# Patient Record
Sex: FEMALE | Race: WHITE | NOT HISPANIC OR LATINO | Employment: OTHER | URBAN - METROPOLITAN AREA
[De-identification: names, ages, dates, MRNs, and addresses within clinical notes are randomized per-mention and may not be internally consistent; named-entity substitution may affect disease eponyms.]

---

## 2017-01-20 DIAGNOSIS — E03.9 ACQUIRED HYPOTHYROIDISM: ICD-10-CM

## 2017-01-20 RX ORDER — LEVOTHYROXINE SODIUM 88 MCG
TABLET ORAL
Qty: 90 TABLET | Refills: 0 | Status: SHIPPED | OUTPATIENT
Start: 2017-01-20 | End: 2017-04-16 | Stop reason: SDUPTHER

## 2017-03-03 ENCOUNTER — OFFICE VISIT (OUTPATIENT)
Dept: INTERNAL MEDICINE | Facility: CLINIC | Age: 37
End: 2017-03-03

## 2017-03-03 VITALS
BODY MASS INDEX: 26.96 KG/M2 | HEIGHT: 65 IN | HEART RATE: 80 BPM | OXYGEN SATURATION: 98 % | WEIGHT: 161.8 LBS | SYSTOLIC BLOOD PRESSURE: 116 MMHG | DIASTOLIC BLOOD PRESSURE: 78 MMHG

## 2017-03-03 DIAGNOSIS — L70.9 ACNE, UNSPECIFIED ACNE TYPE: ICD-10-CM

## 2017-03-03 DIAGNOSIS — M54.40 BILATERAL LOW BACK PAIN WITH SCIATICA, SCIATICA LATERALITY UNSPECIFIED, UNSPECIFIED CHRONICITY: Primary | ICD-10-CM

## 2017-03-03 DIAGNOSIS — E53.8 B12 DEFICIENCY: ICD-10-CM

## 2017-03-03 DIAGNOSIS — R53.83 FATIGUE, UNSPECIFIED TYPE: ICD-10-CM

## 2017-03-03 DIAGNOSIS — E03.9 ACQUIRED HYPOTHYROIDISM: ICD-10-CM

## 2017-03-03 LAB
ANION GAP SERPL CALCULATED.3IONS-SCNC: 3 MMOL/L (ref 3–11)
BUN BLD-MCNC: 12 MG/DL (ref 9–23)
BUN/CREAT SERPL: 17.1 (ref 7–25)
CALCIUM SPEC-SCNC: 9.5 MG/DL (ref 8.7–10.4)
CHLORIDE SERPL-SCNC: 111 MMOL/L (ref 99–109)
CO2 SERPL-SCNC: 27 MMOL/L (ref 20–31)
CREAT BLD-MCNC: 0.7 MG/DL (ref 0.6–1.3)
DEPRECATED RDW RBC AUTO: 38.9 FL (ref 37–54)
ERYTHROCYTE [DISTWIDTH] IN BLOOD BY AUTOMATED COUNT: 11.5 % (ref 11.3–14.5)
FERRITIN SERPL-MCNC: 154 NG/ML (ref 10–291)
GFR SERPL CREATININE-BSD FRML MDRD: 94 ML/MIN/1.73
GLUCOSE BLD-MCNC: 89 MG/DL (ref 70–100)
HCT VFR BLD AUTO: 42.2 % (ref 34.5–44)
HGB BLD-MCNC: 14.8 G/DL (ref 11.5–15.5)
MCH RBC QN AUTO: 32.8 PG (ref 27–31)
MCHC RBC AUTO-ENTMCNC: 35.1 G/DL (ref 32–36)
MCV RBC AUTO: 93.6 FL (ref 80–99)
PLATELET # BLD AUTO: 268 10*3/MM3 (ref 150–450)
PMV BLD AUTO: 10.7 FL (ref 6–12)
POTASSIUM BLD-SCNC: 4.3 MMOL/L (ref 3.5–5.5)
RBC # BLD AUTO: 4.51 10*6/MM3 (ref 3.89–5.14)
SODIUM BLD-SCNC: 141 MMOL/L (ref 132–146)
T4 FREE SERPL-MCNC: 1.49 NG/DL (ref 0.89–1.76)
TSH SERPL DL<=0.05 MIU/L-ACNC: 1.15 MIU/ML (ref 0.35–5.35)
VIT B12 BLD-MCNC: >2000 PG/ML (ref 211–911)
WBC NRBC COR # BLD: 11.67 10*3/MM3 (ref 3.5–10.8)

## 2017-03-03 PROCEDURE — 99214 OFFICE O/P EST MOD 30 MIN: CPT | Performed by: INTERNAL MEDICINE

## 2017-03-03 PROCEDURE — 85027 COMPLETE CBC AUTOMATED: CPT | Performed by: INTERNAL MEDICINE

## 2017-03-03 PROCEDURE — 82728 ASSAY OF FERRITIN: CPT | Performed by: INTERNAL MEDICINE

## 2017-03-03 PROCEDURE — 84439 ASSAY OF FREE THYROXINE: CPT | Performed by: INTERNAL MEDICINE

## 2017-03-03 PROCEDURE — 84443 ASSAY THYROID STIM HORMONE: CPT | Performed by: INTERNAL MEDICINE

## 2017-03-03 PROCEDURE — 82607 VITAMIN B-12: CPT | Performed by: INTERNAL MEDICINE

## 2017-03-03 PROCEDURE — 80048 BASIC METABOLIC PNL TOTAL CA: CPT | Performed by: INTERNAL MEDICINE

## 2017-03-03 RX ORDER — HYDROCODONE BITARTRATE AND ACETAMINOPHEN 10; 325 MG/1; MG/1
1 TABLET ORAL EVERY 6 HOURS PRN
Qty: 120 TABLET | Refills: 0 | Status: SHIPPED | OUTPATIENT
Start: 2017-03-03 | End: 2017-03-03 | Stop reason: SDUPTHER

## 2017-03-03 RX ORDER — HYDROCODONE BITARTRATE AND ACETAMINOPHEN 10; 325 MG/1; MG/1
1 TABLET ORAL EVERY 6 HOURS PRN
Qty: 120 TABLET | Refills: 0 | Status: SHIPPED | OUTPATIENT
Start: 2017-03-03 | End: 2017-05-08 | Stop reason: SDUPTHER

## 2017-03-03 RX ORDER — CYCLOBENZAPRINE HCL 10 MG
10 TABLET ORAL DAILY
Qty: 30 TABLET | Refills: 4 | Status: SHIPPED | OUTPATIENT
Start: 2017-03-03 | End: 2019-06-18 | Stop reason: SDUPTHER

## 2017-03-03 RX ORDER — SPIRONOLACTONE 25 MG/1
50 TABLET ORAL DAILY
Qty: 60 TABLET | Refills: 5 | Status: SHIPPED | OUTPATIENT
Start: 2017-03-03 | End: 2017-08-08 | Stop reason: SDUPTHER

## 2017-03-03 NOTE — PROGRESS NOTES
Fatigue (worried about thyroid) and Follow-up (medication refills)    Subjective   Subha Paitno is a 36 y.o. female is here today for follow-up.    History of Present Illness   Having a lot of fatigue, thinks its due to her thyroid or B12. Thyroid was low last check.  Keeps forgetting to take her B12.  Also having a lot of breakout, which she never had before.    Current Outpatient Prescriptions:   •  Cyanocobalamin (VITAMIN B-12) 5000 MCG sublingual tablet, Take 1 tablet by mouth Daily., Disp: , Rfl: 3  •  cyclobenzaprine (FLEXERIL) 10 MG tablet, Take 1 tablet by mouth Daily., Disp: 30 tablet, Rfl: 4  •  HYDROcodone-acetaminophen (NORCO)  MG per tablet, Take 1 tablet by mouth Every 6 (Six) Hours As Needed for moderate pain (4-6). Fill after 4/3/17, Disp: 120 tablet, Rfl: 0  •  medroxyPROGESTERone (DEPO-PROVERA) 400 MG/ML suspension injection, Apply  to cheek., Disp: , Rfl:   •  SYNTHROID 88 MCG tablet, TAKE 1 TABLET DAILY, Disp: 90 tablet, Rfl: 0  •  spironolactone (ALDACTONE) 25 MG tablet, Take 2 tablets by mouth Daily., Disp: 60 tablet, Rfl: 5      The following portions of the patient's history were reviewed and updated as appropriate: allergies, current medications, past family history, past medical history, past social history, past surgical history and problem list.    Review of Systems   Constitutional: Positive for fatigue. Negative for chills and fever.   HENT: Positive for rhinorrhea. Negative for ear discharge, ear pain, sinus pressure and sore throat.    Respiratory: Negative for cough, chest tightness and shortness of breath.    Cardiovascular: Negative for chest pain, palpitations and leg swelling.   Gastrointestinal: Negative for diarrhea, nausea and vomiting.   Musculoskeletal: Positive for back pain and myalgias. Negative for arthralgias.   Skin: Positive for rash (acne breakout, and mole on face.).   Neurological: Negative for dizziness, syncope and headaches.   Psychiatric/Behavioral:  "Negative for confusion and sleep disturbance.       Objective   Visit Vitals   • /78   • Pulse 80   • Ht 65\" (165.1 cm)   • Wt 161 lb 12.8 oz (73.4 kg)   • SpO2 98%  Comment: ra   • BMI 26.92 kg/m2     Physical Exam      Results for orders placed or performed in visit on 03/03/17   TSH   Result Value Ref Range    TSH 1.148 0.350 - 5.350 mIU/mL   T4, Free   Result Value Ref Range    Free T4 1.49 0.89 - 1.76 ng/dL   Vitamin B12   Result Value Ref Range    Vitamin B-12 >2000 (H) 211 - 911 pg/mL   Ferritin   Result Value Ref Range    Ferritin 154.00 10.00 - 291.00 ng/mL   CBC (No Diff)   Result Value Ref Range    WBC 11.67 (H) 3.50 - 10.80 10*3/mm3    RBC 4.51 3.89 - 5.14 10*6/mm3    Hemoglobin 14.8 11.5 - 15.5 g/dL    Hematocrit 42.2 34.5 - 44.0 %    MCV 93.6 80.0 - 99.0 fL    MCH 32.8 (H) 27.0 - 31.0 pg    MCHC 35.1 32.0 - 36.0 g/dL    RDW 11.5 11.3 - 14.5 %    RDW-SD 38.9 37.0 - 54.0 fl    MPV 10.7 6.0 - 12.0 fL    Platelets 268 150 - 450 10*3/mm3   Basic Metabolic Panel   Result Value Ref Range    Glucose 89 70 - 100 mg/dL    BUN 12 9 - 23 mg/dL    Creatinine 0.70 0.60 - 1.30 mg/dL    Sodium 141 132 - 146 mmol/L    Potassium 4.3 3.5 - 5.5 mmol/L    Chloride 111 (H) 99 - 109 mmol/L    CO2 27.0 20.0 - 31.0 mmol/L    Calcium 9.5 8.7 - 10.4 mg/dL    eGFR Non African Amer 94 >60 mL/min/1.73    BUN/Creatinine Ratio 17.1 7.0 - 25.0    Anion Gap 3.0 3.0 - 11.0 mmol/L             Assessment/Plan   Diagnoses and all orders for this visit:    Bilateral low back pain with sciatica, sciatica laterality unspecified, unspecified chronicity  -     Discontinue: HYDROcodone-acetaminophen (NORCO)  MG per tablet; Take 1 tablet by mouth Every 6 (Six) Hours As Needed for moderate pain (4-6).  -     HYDROcodone-acetaminophen (NORCO)  MG per tablet; Take 1 tablet by mouth Every 6 (Six) Hours As Needed for moderate pain (4-6). Fill after 4/3/17    Acquired hypothyroidism  -     TSH  -     T4, Free  -     CBC (No " Diff)    Acne, unspecified acne type  -     spironolactone (ALDACTONE) 25 MG tablet; Take 2 tablets by mouth Daily.  -     Basic Metabolic Panel; Future    Fatigue, unspecified type  -     Vitamin B12  -     Ferritin  -     Basic Metabolic Panel    B12 deficiency  -     Vitamin B12    Other orders  -     Cyanocobalamin (VITAMIN B-12) 5000 MCG sublingual tablet; Take 1 tablet by mouth Daily.  -     cyclobenzaprine (FLEXERIL) 10 MG tablet; Take 1 tablet by mouth Daily.    Derm self referral sheet given to get the mole on face evaluated.       The patient has read and signed the [facility] Controlled Substance Contract.  I will continue to see patient for regular follow up appointments.  They are well controlled on their medication.  MARJORIE has been reviewed by me and is updated every 3 months. The patient is aware of the potential for addiction and dependence.      Return in about 3 months (around 6/3/2017) for Next scheduled follow up.

## 2017-03-07 ENCOUNTER — TELEPHONE (OUTPATIENT)
Dept: INTERNAL MEDICINE | Facility: CLINIC | Age: 37
End: 2017-03-07

## 2017-03-07 NOTE — TELEPHONE ENCOUNTER
----- Message from Madhuri Fuller sent at 3/7/2017  3:34 PM EST -----  Contact: PATIENT  RE: LAB RESULTS    PATIENT CALLED TO SEE IF RESULTS FROM RECENT LABS ARE BACK     CALL BACK #[phone]

## 2017-03-09 NOTE — TELEPHONE ENCOUNTER
Pls. Let her know:     Thyroid is much better and the rest of the labs are stable. B12 and iron are excellent.     Please continue current regimen. Letter in the mail.    thanks

## 2017-04-13 ENCOUNTER — LAB (OUTPATIENT)
Dept: INTERNAL MEDICINE | Facility: CLINIC | Age: 37
End: 2017-04-13

## 2017-04-13 DIAGNOSIS — L70.9 ACNE, UNSPECIFIED ACNE TYPE: ICD-10-CM

## 2017-04-13 LAB
ANION GAP SERPL CALCULATED.3IONS-SCNC: 8 MMOL/L (ref 3–11)
BUN BLD-MCNC: 10 MG/DL (ref 9–23)
BUN/CREAT SERPL: 12.5 (ref 7–25)
CALCIUM SPEC-SCNC: 10.2 MG/DL (ref 8.7–10.4)
CHLORIDE SERPL-SCNC: 102 MMOL/L (ref 99–109)
CO2 SERPL-SCNC: 23 MMOL/L (ref 20–31)
CREAT BLD-MCNC: 0.8 MG/DL (ref 0.6–1.3)
GFR SERPL CREATININE-BSD FRML MDRD: 81 ML/MIN/1.73
GLUCOSE BLD-MCNC: 94 MG/DL (ref 70–100)
POTASSIUM BLD-SCNC: 3.9 MMOL/L (ref 3.5–5.5)
SODIUM BLD-SCNC: 133 MMOL/L (ref 132–146)

## 2017-04-13 PROCEDURE — 80048 BASIC METABOLIC PNL TOTAL CA: CPT | Performed by: INTERNAL MEDICINE

## 2017-04-16 DIAGNOSIS — E03.9 ACQUIRED HYPOTHYROIDISM: ICD-10-CM

## 2017-04-17 RX ORDER — LEVOTHYROXINE SODIUM 88 MCG
TABLET ORAL
Qty: 90 TABLET | Refills: 0 | Status: SHIPPED | OUTPATIENT
Start: 2017-04-17 | End: 2017-07-16 | Stop reason: SDUPTHER

## 2017-05-08 DIAGNOSIS — M54.40 BILATERAL LOW BACK PAIN WITH SCIATICA, SCIATICA LATERALITY UNSPECIFIED, UNSPECIFIED CHRONICITY: ICD-10-CM

## 2017-05-08 RX ORDER — HYDROCODONE BITARTRATE AND ACETAMINOPHEN 10; 325 MG/1; MG/1
1 TABLET ORAL EVERY 6 HOURS PRN
Qty: 120 TABLET | Refills: 0 | Status: SHIPPED | OUTPATIENT
Start: 2017-05-08 | End: 2017-06-06 | Stop reason: SDUPTHER

## 2017-06-06 ENCOUNTER — OFFICE VISIT (OUTPATIENT)
Dept: INTERNAL MEDICINE | Facility: CLINIC | Age: 37
End: 2017-06-06

## 2017-06-06 VITALS
SYSTOLIC BLOOD PRESSURE: 118 MMHG | WEIGHT: 155 LBS | BODY MASS INDEX: 25.79 KG/M2 | HEART RATE: 91 BPM | DIASTOLIC BLOOD PRESSURE: 78 MMHG | OXYGEN SATURATION: 99 %

## 2017-06-06 DIAGNOSIS — J30.2 SEASONAL ALLERGIC RHINITIS, UNSPECIFIED ALLERGIC RHINITIS TRIGGER: ICD-10-CM

## 2017-06-06 DIAGNOSIS — F43.89 REACTION TO CHRONIC STRESS: ICD-10-CM

## 2017-06-06 DIAGNOSIS — F17.200 CURRENT SMOKER: Primary | ICD-10-CM

## 2017-06-06 DIAGNOSIS — M54.40 BILATERAL LOW BACK PAIN WITH SCIATICA, SCIATICA LATERALITY UNSPECIFIED, UNSPECIFIED CHRONICITY: ICD-10-CM

## 2017-06-06 PROCEDURE — 99214 OFFICE O/P EST MOD 30 MIN: CPT | Performed by: INTERNAL MEDICINE

## 2017-06-06 RX ORDER — NICOTINE 21 MG/24HR
1 PATCH, TRANSDERMAL 24 HOURS TRANSDERMAL EVERY 24 HOURS
Qty: 14 PATCH | Refills: 3 | Status: SHIPPED | OUTPATIENT
Start: 2017-06-06 | End: 2018-12-11

## 2017-06-06 RX ORDER — FLUOXETINE 20 MG/1
20 TABLET, FILM COATED ORAL DAILY
Qty: 30 TABLET | Refills: 3 | Status: SHIPPED | OUTPATIENT
Start: 2017-06-06 | End: 2017-10-03 | Stop reason: SINTOL

## 2017-06-06 RX ORDER — METHYLPREDNISOLONE 4 MG/1
TABLET ORAL
Qty: 21 TABLET | Refills: 0 | Status: SHIPPED | OUTPATIENT
Start: 2017-06-06 | End: 2017-08-08

## 2017-06-06 RX ORDER — HYDROCODONE BITARTRATE AND ACETAMINOPHEN 10; 325 MG/1; MG/1
1 TABLET ORAL EVERY 6 HOURS PRN
Qty: 120 TABLET | Refills: 0 | Status: SHIPPED | OUTPATIENT
Start: 2017-06-06 | End: 2017-06-06 | Stop reason: SDUPTHER

## 2017-06-06 RX ORDER — HYDROCODONE BITARTRATE AND ACETAMINOPHEN 10; 325 MG/1; MG/1
1 TABLET ORAL EVERY 6 HOURS PRN
Qty: 120 TABLET | Refills: 0 | Status: SHIPPED | OUTPATIENT
Start: 2017-06-06 | End: 2017-08-08 | Stop reason: SDUPTHER

## 2017-06-06 RX ORDER — LORATADINE 10 MG/1
10 TABLET ORAL DAILY
Qty: 30 TABLET | Refills: 3 | Status: SHIPPED | OUTPATIENT
Start: 2017-06-06 | End: 2017-08-09 | Stop reason: SDUPTHER

## 2017-06-06 RX ORDER — FLUTICASONE PROPIONATE 50 MCG
2 SPRAY, SUSPENSION (ML) NASAL DAILY
Qty: 1 EACH | Refills: 11 | Status: SHIPPED | OUTPATIENT
Start: 2017-06-06 | End: 2017-07-06

## 2017-06-06 NOTE — PROGRESS NOTES
Back Pain; Allergies; and Stress    Subjective   Subha Patino is a 37 y.o. female is here today for follow-up.    History of Present Illness   Notes coughing more, thinks its allergies.  Stable on curent meds for pain, denies any problems from it.  Very stressed. Worried about her smoking and wants to quit.  Mom has been on prozac but she is scared to take it.  Also havinga lot of congestion and some cough.    Current Outpatient Prescriptions:   •  Cyanocobalamin (VITAMIN B-12) 5000 MCG sublingual tablet, Take 1 tablet by mouth Daily., Disp: , Rfl: 3  •  cyclobenzaprine (FLEXERIL) 10 MG tablet, Take 1 tablet by mouth Daily., Disp: 30 tablet, Rfl: 4  •  HYDROcodone-acetaminophen (NORCO)  MG per tablet, Take 1 tablet by mouth Every 6 (Six) Hours As Needed for Moderate Pain (4-6). Fill after 7/8/17, Disp: 120 tablet, Rfl: 0  •  medroxyPROGESTERone (DEPO-PROVERA) 400 MG/ML suspension injection, Apply  to cheek., Disp: , Rfl:   •  spironolactone (ALDACTONE) 25 MG tablet, Take 2 tablets by mouth Daily., Disp: 60 tablet, Rfl: 5  •  SYNTHROID 88 MCG tablet, TAKE 1 TABLET DAILY, Disp: 90 tablet, Rfl: 0  •  FLUoxetine (PROzac) 20 MG tablet, Take 1 tablet by mouth Daily., Disp: 30 tablet, Rfl: 3  •  fluticasone (FLONASE) 50 MCG/ACT nasal spray, 2 sprays into each nostril Daily for 30 days. Administer 2 sprays in each nostril for each dose for allergies, Disp: 1 each, Rfl: 11  •  loratadine (CLARITIN) 10 MG tablet, Take 1 tablet by mouth Daily., Disp: 30 tablet, Rfl: 3  •  MethylPREDNISolone (MEDROL, TONA,) 4 MG tablet, Take as directed on package instructions., Disp: 21 tablet, Rfl: 0  •  nicotine (NICODERM CQ) 14 MG/24HR patch, Place 1 patch on the skin Daily., Disp: 14 patch, Rfl: 3      The following portions of the patient's history were reviewed and updated as appropriate: allergies, current medications, past family history, past medical history, past social history, past surgical history and problem list.    Review  of Systems   Constitutional: Positive for fatigue. Negative for chills and fever.   HENT: Positive for rhinorrhea. Negative for ear discharge, ear pain, sinus pressure and sore throat.    Respiratory: Negative for cough, chest tightness and shortness of breath.    Cardiovascular: Negative for chest pain, palpitations and leg swelling.   Gastrointestinal: Negative for diarrhea, nausea and vomiting.   Musculoskeletal: Positive for back pain and myalgias. Negative for arthralgias.   Skin: Negative for rash.   Neurological: Negative for dizziness, syncope and headaches.   Psychiatric/Behavioral: Positive for dysphoric mood. Negative for confusion and sleep disturbance. The patient is nervous/anxious.        Objective   /78  Pulse 91  Wt 155 lb (70.3 kg)  SpO2 99% Comment: ra  BMI 25.79 kg/m2  Physical Exam      Results for orders placed or performed in visit on 04/13/17   Basic Metabolic Panel   Result Value Ref Range    Glucose 94 70 - 100 mg/dL    BUN 10 9 - 23 mg/dL    Creatinine 0.80 0.60 - 1.30 mg/dL    Sodium 133 132 - 146 mmol/L    Potassium 3.9 3.5 - 5.5 mmol/L    Chloride 102 99 - 109 mmol/L    CO2 23.0 20.0 - 31.0 mmol/L    Calcium 10.2 8.7 - 10.4 mg/dL    eGFR Non African Amer 81 >60 mL/min/1.73    BUN/Creatinine Ratio 12.5 7.0 - 25.0    Anion Gap 8.0 3.0 - 11.0 mmol/L             Assessment/Plan   Diagnoses and all orders for this visit:    Current smoker  -     nicotine (NICODERM CQ) 14 MG/24HR patch; Place 1 patch on the skin Daily.    Bilateral low back pain with sciatica, sciatica laterality unspecified, unspecified chronicity  -     Discontinue: HYDROcodone-acetaminophen (NORCO)  MG per tablet; Take 1 tablet by mouth Every 6 (Six) Hours As Needed for Moderate Pain (4-6).  -     HYDROcodone-acetaminophen (NORCO)  MG per tablet; Take 1 tablet by mouth Every 6 (Six) Hours As Needed for Moderate Pain (4-6). Fill after 7/8/17    Seasonal allergic rhinitis, unspecified allergic rhinitis  trigger  -     loratadine (CLARITIN) 10 MG tablet; Take 1 tablet by mouth Daily.  -     fluticasone (FLONASE) 50 MCG/ACT nasal spray; 2 sprays into each nostril Daily for 30 days. Administer 2 sprays in each nostril for each dose for allergies  -     MethylPREDNISolone (MEDROL, TONA,) 4 MG tablet; Take as directed on package instructions.    Reaction to chronic stress  Comments:  start low dose prozac as mom is on it and helps.    Other orders  -     FLUoxetine (PROzac) 20 MG tablet; Take 1 tablet by mouth Daily.    Plans to change pharmacies to  in [place]. Adv. To update her contract next visit if she does so.         The patient has read and signed the [facility] Controlled Substance Contract.  I will continue to see patient for regular follow up appointments.  They are well controlled on their medication.  MARJORIE has been reviewed by me and is updated every 3 months. The patient is aware of the potential for addiction and dependence.    Return in about 2 months (around 8/6/2017) for Recheck.

## 2017-07-16 DIAGNOSIS — E03.9 ACQUIRED HYPOTHYROIDISM: ICD-10-CM

## 2017-07-17 RX ORDER — LEVOTHYROXINE SODIUM 88 MCG
TABLET ORAL
Qty: 90 TABLET | Refills: 0 | Status: SHIPPED | OUTPATIENT
Start: 2017-07-17 | End: 2017-10-15 | Stop reason: SDUPTHER

## 2017-08-08 ENCOUNTER — OFFICE VISIT (OUTPATIENT)
Dept: INTERNAL MEDICINE | Facility: CLINIC | Age: 37
End: 2017-08-08

## 2017-08-08 VITALS
DIASTOLIC BLOOD PRESSURE: 70 MMHG | BODY MASS INDEX: 25.83 KG/M2 | HEART RATE: 79 BPM | SYSTOLIC BLOOD PRESSURE: 100 MMHG | OXYGEN SATURATION: 98 % | WEIGHT: 155.2 LBS

## 2017-08-08 DIAGNOSIS — L70.8 OTHER ACNE: ICD-10-CM

## 2017-08-08 DIAGNOSIS — M54.40 BILATERAL LOW BACK PAIN WITH SCIATICA, SCIATICA LATERALITY UNSPECIFIED, UNSPECIFIED CHRONICITY: Primary | ICD-10-CM

## 2017-08-08 DIAGNOSIS — L70.9 ACNE, UNSPECIFIED ACNE TYPE: ICD-10-CM

## 2017-08-08 PROCEDURE — 99213 OFFICE O/P EST LOW 20 MIN: CPT | Performed by: INTERNAL MEDICINE

## 2017-08-08 RX ORDER — CLINDAMYCIN AND BENZOYL PEROXIDE 10; 50 MG/G; MG/G
GEL TOPICAL 2 TIMES DAILY
Qty: 35 G | Refills: 0 | Status: SHIPPED | OUTPATIENT
Start: 2017-08-08 | End: 2017-08-10 | Stop reason: CLARIF

## 2017-08-08 RX ORDER — SPIRONOLACTONE 25 MG/1
50 TABLET ORAL DAILY
Qty: 60 TABLET | Refills: 5 | Status: SHIPPED | OUTPATIENT
Start: 2017-08-08 | End: 2018-04-17 | Stop reason: SDUPTHER

## 2017-08-08 RX ORDER — HYDROCODONE BITARTRATE AND ACETAMINOPHEN 10; 325 MG/1; MG/1
1 TABLET ORAL EVERY 6 HOURS PRN
Qty: 120 TABLET | Refills: 0 | Status: SHIPPED | OUTPATIENT
Start: 2017-08-08 | End: 2017-10-03 | Stop reason: SDUPTHER

## 2017-08-08 RX ORDER — HYDROCODONE BITARTRATE AND ACETAMINOPHEN 10; 325 MG/1; MG/1
1 TABLET ORAL EVERY 6 HOURS PRN
Qty: 120 TABLET | Refills: 0 | Status: SHIPPED | OUTPATIENT
Start: 2017-08-08 | End: 2017-08-08 | Stop reason: SDUPTHER

## 2017-08-08 NOTE — PROGRESS NOTES
Hypothyroidism; Back Pain; and Med Refill    Subjective   Subha Patino is a 37 y.o. female is here today for follow-up.    History of Present Illness   Subha here for a follow up on her acne, doing better on the aldactone.  Has appt. With dr. Roth next week for her thyroid.  Back pain and joint pains better on her current regimen, needs refills on her meds.    Current Outpatient Prescriptions:   •  Cyanocobalamin (VITAMIN B-12) 5000 MCG sublingual tablet, Take 1 tablet by mouth Daily., Disp: , Rfl: 3  •  cyclobenzaprine (FLEXERIL) 10 MG tablet, Take 1 tablet by mouth Daily., Disp: 30 tablet, Rfl: 4  •  FLUoxetine (PROzac) 20 MG tablet, Take 1 tablet by mouth Daily., Disp: 30 tablet, Rfl: 3  •  HYDROcodone-acetaminophen (NORCO)  MG per tablet, Take 1 tablet by mouth Every 6 (Six) Hours As Needed for Moderate Pain (4-6). Fill after 9/8/17, Disp: 120 tablet, Rfl: 0  •  loratadine (CLARITIN) 10 MG tablet, Take 1 tablet by mouth Daily., Disp: 30 tablet, Rfl: 3  •  medroxyPROGESTERone (DEPO-PROVERA) 400 MG/ML suspension injection, Apply  to cheek., Disp: , Rfl:   •  spironolactone (ALDACTONE) 25 MG tablet, Take 2 tablets by mouth Daily., Disp: 60 tablet, Rfl: 5  •  SYNTHROID 88 MCG tablet, TAKE 1 TABLET DAILY, Disp: 90 tablet, Rfl: 0  •  clindamycin-benzoyl peroxide (BENZACLIN) 1-5 % gel, Apply  topically 2 (Two) Times a Day., Disp: 35 g, Rfl: 0  •  nicotine (NICODERM CQ) 14 MG/24HR patch, Place 1 patch on the skin Daily., Disp: 14 patch, Rfl: 3      The following portions of the patient's history were reviewed and updated as appropriate: allergies, current medications, past family history, past medical history, past social history, past surgical history and problem list.    Review of Systems   Constitutional: Positive for fatigue.   Musculoskeletal: Positive for back pain and myalgias.   Psychiatric/Behavioral: The patient is nervous/anxious.        Objective   /70  Pulse 79  Wt 155 lb 3.2 oz (70.4 kg)   SpO2 98% Comment: ra  BMI 25.83 kg/m2  Physical Exam   Constitutional: She is oriented to person, place, and time. She appears well-developed and well-nourished.   HENT:   Head: Normocephalic and atraumatic.   Mouth/Throat: No oropharyngeal exudate.   Eyes: Conjunctivae are normal. Pupils are equal, round, and reactive to light.   Neck: Neck supple. No thyromegaly present.   Cardiovascular: Normal rate, regular rhythm and intact distal pulses.    Pulmonary/Chest: Effort normal and breath sounds normal.   Abdominal: Soft. Bowel sounds are normal. She exhibits no distension. There is no tenderness.   Musculoskeletal: She exhibits tenderness (low back tenderness). She exhibits no edema.   Neurological: She is alert and oriented to person, place, and time. No cranial nerve deficit.   Skin: Skin is warm and dry.   AV on face   Psychiatric: She has a normal mood and affect. Judgment normal.   Nursing note and vitals reviewed.        Results for orders placed or performed in visit on 04/13/17   Basic Metabolic Panel   Result Value Ref Range    Glucose 94 70 - 100 mg/dL    BUN 10 9 - 23 mg/dL    Creatinine 0.80 0.60 - 1.30 mg/dL    Sodium 133 132 - 146 mmol/L    Potassium 3.9 3.5 - 5.5 mmol/L    Chloride 102 99 - 109 mmol/L    CO2 23.0 20.0 - 31.0 mmol/L    Calcium 10.2 8.7 - 10.4 mg/dL    eGFR Non African Amer 81 >60 mL/min/1.73    BUN/Creatinine Ratio 12.5 7.0 - 25.0    Anion Gap 8.0 3.0 - 11.0 mmol/L             Assessment/Plan   Diagnoses and all orders for this visit:    Bilateral low back pain with sciatica, sciatica laterality unspecified, unspecified chronicity  -     Discontinue: HYDROcodone-acetaminophen (NORCO)  MG per tablet; Take 1 tablet by mouth Every 6 (Six) Hours As Needed for Moderate Pain (4-6).  -     HYDROcodone-acetaminophen (NORCO)  MG per tablet; Take 1 tablet by mouth Every 6 (Six) Hours As Needed for Moderate Pain (4-6). Fill after 9/8/17    Other acne  -     clindamycin-benzoyl  peroxide (BENZACLIN) 1-5 % gel; Apply  topically 2 (Two) Times a Day.  -     Basic Metabolic Panel; Future    Acne, unspecified acne type  -     spironolactone (ALDACTONE) 25 MG tablet; Take 2 tablets by mouth Daily.      Will discuss ptc referral at f/u.     The patient has read and signed the [facility] Controlled Substance Contract.  I will continue to see patient for regular follow up appointments.  They are well controlled on their medication.  MARJORIE has been reviewed by me and is updated every 3 months. The patient is aware of the potential for addiction and dependence.        Return in about 2 months (around 10/8/2017) for Recheck.

## 2017-08-09 DIAGNOSIS — J30.2 SEASONAL ALLERGIC RHINITIS, UNSPECIFIED ALLERGIC RHINITIS TRIGGER: ICD-10-CM

## 2017-08-09 RX ORDER — LORATADINE 10 MG/1
10 TABLET ORAL DAILY
Qty: 90 TABLET | Refills: 1 | Status: SHIPPED | OUTPATIENT
Start: 2017-08-09 | End: 2020-03-24 | Stop reason: SDUPTHER

## 2017-08-10 RX ORDER — CLINDAMYCIN PHOSPHATE 10 MG/G
GEL TOPICAL 2 TIMES DAILY
Qty: 75 TUBE | Refills: 5 | Status: SHIPPED | OUTPATIENT
Start: 2017-08-10 | End: 2017-10-03

## 2017-08-16 ENCOUNTER — OFFICE VISIT (OUTPATIENT)
Dept: ENDOCRINOLOGY | Facility: CLINIC | Age: 37
End: 2017-08-16

## 2017-08-16 VITALS
BODY MASS INDEX: 25.66 KG/M2 | WEIGHT: 154 LBS | SYSTOLIC BLOOD PRESSURE: 108 MMHG | DIASTOLIC BLOOD PRESSURE: 74 MMHG | OXYGEN SATURATION: 98 % | HEART RATE: 86 BPM | HEIGHT: 65 IN

## 2017-08-16 DIAGNOSIS — E03.9 ACQUIRED HYPOTHYROIDISM: Primary | ICD-10-CM

## 2017-08-16 DIAGNOSIS — F32.A DEPRESSION, UNSPECIFIED DEPRESSION TYPE: ICD-10-CM

## 2017-08-16 LAB
ANION GAP SERPL CALCULATED.3IONS-SCNC: -2 MMOL/L (ref 3–11)
BUN BLD-MCNC: 12 MG/DL (ref 9–23)
BUN/CREAT SERPL: 15 (ref 7–25)
CALCIUM SPEC-SCNC: 9.7 MG/DL (ref 8.7–10.4)
CHLORIDE SERPL-SCNC: 111 MMOL/L (ref 99–109)
CO2 SERPL-SCNC: 29 MMOL/L (ref 20–31)
CREAT BLD-MCNC: 0.8 MG/DL (ref 0.6–1.3)
GFR SERPL CREATININE-BSD FRML MDRD: 80 ML/MIN/1.73
GLUCOSE BLD-MCNC: 127 MG/DL (ref 70–100)
POTASSIUM BLD-SCNC: 4.3 MMOL/L (ref 3.5–5.5)
SODIUM BLD-SCNC: 138 MMOL/L (ref 132–146)
T4 FREE SERPL-MCNC: 1.32 NG/DL (ref 0.89–1.76)
TSH SERPL DL<=0.05 MIU/L-ACNC: 1.31 MIU/ML (ref 0.35–5.35)
VIT B12 BLD-MCNC: 952 PG/ML (ref 211–911)

## 2017-08-16 PROCEDURE — 80048 BASIC METABOLIC PNL TOTAL CA: CPT | Performed by: INTERNAL MEDICINE

## 2017-08-16 PROCEDURE — 99213 OFFICE O/P EST LOW 20 MIN: CPT | Performed by: INTERNAL MEDICINE

## 2017-08-16 PROCEDURE — 84443 ASSAY THYROID STIM HORMONE: CPT | Performed by: INTERNAL MEDICINE

## 2017-08-16 PROCEDURE — 82607 VITAMIN B-12: CPT | Performed by: INTERNAL MEDICINE

## 2017-08-16 PROCEDURE — 84439 ASSAY OF FREE THYROXINE: CPT | Performed by: INTERNAL MEDICINE

## 2017-08-16 NOTE — PROGRESS NOTES
Chief complaint  Hypothyroidism (F/u for hypothyroidism, C/o brain fog and increased emotions)    Subjective   Subha Patino is a 37 y.o. female is here today for follow-up.    Hypothyroidism       Follow-up for  Hypothyroidism: The patient is being seen for follow-up of hypothyroidism. The patient has hypothyroidism of undetermined etiology.  Current treatment includes Synthroid 88 mcg    Previous meds: genertic levothyroxine triggered palpitations, Tirosint was great but expensive. Synthroid seem to work best for her.   U/s thyroid in 2013 - small multiple nodules with largest measuring 3 mm.     C/o 1 week of depression, fatigue, brain fog. Sudden onset of sx. Similar picture occurred 1 year ago when TSH was 11 and we increased the dose.       Medications    Current Outpatient Prescriptions:   •  clindamycin (CLINDAGEL) 1 % gel, Apply  topically 2 (Two) Times a Day. Please ca script for Clinda-Benzoyl perox 1-5% pump, thank you, Disp: 75 tube, Rfl: 5  •  Cyanocobalamin (VITAMIN B-12) 5000 MCG sublingual tablet, Take 1 tablet by mouth Daily., Disp: , Rfl: 3  •  cyclobenzaprine (FLEXERIL) 10 MG tablet, Take 1 tablet by mouth Daily., Disp: 30 tablet, Rfl: 4  •  FLUoxetine (PROzac) 20 MG tablet, Take 1 tablet by mouth Daily., Disp: 30 tablet, Rfl: 3  •  HYDROcodone-acetaminophen (NORCO)  MG per tablet, Take 1 tablet by mouth Every 6 (Six) Hours As Needed for Moderate Pain (4-6). Fill after 9/8/17, Disp: 120 tablet, Rfl: 0  •  loratadine (CLARITIN) 10 MG tablet, Take 1 tablet by mouth Daily., Disp: 90 tablet, Rfl: 1  •  medroxyPROGESTERone (DEPO-PROVERA) 400 MG/ML suspension injection, Apply  to cheek., Disp: , Rfl:   •  nicotine (NICODERM CQ) 14 MG/24HR patch, Place 1 patch on the skin Daily., Disp: 14 patch, Rfl: 3  •  spironolactone (ALDACTONE) 25 MG tablet, Take 2 tablets by mouth Daily., Disp: 60 tablet, Rfl: 5  •  SYNTHROID 88 MCG tablet, TAKE 1 TABLET DAILY, Disp: 90 tablet, Rfl: 0    PMH  The following  "portions of the patient's history were reviewed and updated as appropriate: allergies, current medications, past family history, past medical history, past social history, past surgical history and problem list.    Review of systems  Review of Systems   Cardiovascular: Positive for palpitations (intermittent).   Psychiatric/Behavioral: The patient is nervous/anxious.    All other systems reviewed and are negative.      Physical exam  Objective   Blood pressure 108/74, pulse 86, height 65\" (165.1 cm), weight 154 lb (69.9 kg), SpO2 98 %.   Physical Exam   Constitutional: She is oriented to person, place, and time. She appears well-developed and well-nourished.   HENT:   Head: Normocephalic and atraumatic.   Eyes: Conjunctivae are normal.   Neck: Normal range of motion. No muscular tenderness present. Carotid bruit is not present. No thyroid mass and no thyromegaly present.   The neck is supple and no assimetry visualized   Cardiovascular: Normal rate, regular rhythm and normal heart sounds.    Pulmonary/Chest: Effort normal and breath sounds normal.   Lymphadenopathy:     She has no cervical adenopathy.   Neurological: She is alert and oriented to person, place, and time.   Psychiatric: She has a normal mood and affect. Thought content normal.   Vitals reviewed.        LABS AND IMAGING  Lab Results   Component Value Date    TSH 1.148 03/03/2017           Assessment  Assessment/Plan   Problem List Items Addressed This Visit        Endocrine    Hypothyroidism - Primary      Other Visit Diagnoses     Depression, unspecified depression type              Plan  Synthroid 88 mcg, repeat labs for dose adjustments. I have advised her to take extra tablet now while waiting for results.   Follow-up in 9-12 months  "

## 2017-08-22 ENCOUNTER — TELEPHONE (OUTPATIENT)
Dept: INTERNAL MEDICINE | Facility: CLINIC | Age: 37
End: 2017-08-22

## 2017-08-29 DIAGNOSIS — L70.9 ACNE, UNSPECIFIED ACNE TYPE: ICD-10-CM

## 2017-08-29 RX ORDER — SPIRONOLACTONE 25 MG/1
TABLET ORAL
Qty: 60 TABLET | Refills: 5 | OUTPATIENT
Start: 2017-08-29

## 2017-10-03 ENCOUNTER — OFFICE VISIT (OUTPATIENT)
Dept: INTERNAL MEDICINE | Facility: CLINIC | Age: 37
End: 2017-10-03

## 2017-10-03 VITALS
RESPIRATION RATE: 20 BRPM | SYSTOLIC BLOOD PRESSURE: 130 MMHG | BODY MASS INDEX: 26.09 KG/M2 | HEART RATE: 108 BPM | WEIGHT: 156.8 LBS | DIASTOLIC BLOOD PRESSURE: 62 MMHG

## 2017-10-03 DIAGNOSIS — M54.40 BILATERAL LOW BACK PAIN WITH SCIATICA, SCIATICA LATERALITY UNSPECIFIED, UNSPECIFIED CHRONICITY: ICD-10-CM

## 2017-10-03 DIAGNOSIS — F43.89 REACTION TO CHRONIC STRESS: Primary | ICD-10-CM

## 2017-10-03 PROCEDURE — 99214 OFFICE O/P EST MOD 30 MIN: CPT | Performed by: INTERNAL MEDICINE

## 2017-10-03 RX ORDER — HYDROCODONE BITARTRATE AND ACETAMINOPHEN 10; 325 MG/1; MG/1
1 TABLET ORAL EVERY 6 HOURS PRN
Qty: 120 TABLET | Refills: 0 | Status: SHIPPED | OUTPATIENT
Start: 2017-10-03 | End: 2017-10-03 | Stop reason: SDUPTHER

## 2017-10-03 RX ORDER — FLUOXETINE 20 MG/1
20 TABLET, FILM COATED ORAL DAILY
Qty: 30 TABLET | Refills: 3
Start: 2017-10-03 | End: 2018-04-17

## 2017-10-03 RX ORDER — HYDROCODONE BITARTRATE AND ACETAMINOPHEN 10; 325 MG/1; MG/1
1 TABLET ORAL EVERY 6 HOURS PRN
Qty: 120 TABLET | Refills: 0 | Status: SHIPPED | OUTPATIENT
Start: 2017-10-03 | End: 2017-12-05 | Stop reason: SDUPTHER

## 2017-10-03 NOTE — PROGRESS NOTES
Follow-up (2 month Lower back pain.)    Subjective   Subha Patino is a 37 y.o. female is here today for follow-up.    History of Present Illness   Very tired missed her thyroid dose.Thinks may be related to thyroid, but last labs were ok.  Lost her 's GM.    Current Outpatient Prescriptions:   •  Cyanocobalamin (VITAMIN B-12) 5000 MCG sublingual tablet, Take 1 tablet by mouth Daily., Disp: , Rfl: 3  •  cyclobenzaprine (FLEXERIL) 10 MG tablet, Take 1 tablet by mouth Daily., Disp: 30 tablet, Rfl: 4  •  HYDROcodone-acetaminophen (NORCO)  MG per tablet, Take 1 tablet by mouth Every 6 (Six) Hours As Needed for Moderate Pain . Fill after 11/7/17, Disp: 120 tablet, Rfl: 0  •  loratadine (CLARITIN) 10 MG tablet, Take 1 tablet by mouth Daily., Disp: 90 tablet, Rfl: 1  •  medroxyPROGESTERone (DEPO-PROVERA) 400 MG/ML suspension injection, Apply  to cheek., Disp: , Rfl:   •  spironolactone (ALDACTONE) 25 MG tablet, Take 2 tablets by mouth Daily., Disp: 60 tablet, Rfl: 5  •  SYNTHROID 88 MCG tablet, TAKE 1 TABLET DAILY, Disp: 90 tablet, Rfl: 0  •  FLUoxetine (PROzac) 20 MG tablet, Take 1 tablet by mouth Daily., Disp: 30 tablet, Rfl: 3  •  nicotine (NICODERM CQ) 14 MG/24HR patch, Place 1 patch on the skin Daily., Disp: 14 patch, Rfl: 3      The following portions of the patient's history were reviewed and updated as appropriate: allergies, current medications, past family history, past medical history, past social history, past surgical history and problem list.    Review of Systems   Constitutional: Positive for fatigue. Negative for chills and fever.   HENT: Negative for ear discharge, ear pain, sinus pressure and sore throat.    Respiratory: Negative for cough, chest tightness and shortness of breath.    Cardiovascular: Negative for chest pain, palpitations and leg swelling.   Gastrointestinal: Negative for diarrhea, nausea and vomiting.   Musculoskeletal: Positive for arthralgias and back pain. Negative for  myalgias.   Neurological: Negative for dizziness, syncope and headaches.   Psychiatric/Behavioral: Positive for dysphoric mood. Negative for confusion and sleep disturbance.       Objective   /62 (BP Location: Left arm, Patient Position: Sitting)  Pulse 108  Resp 20  Wt 156 lb 12.8 oz (71.1 kg)  BMI 26.09 kg/m2  Physical Exam   Constitutional: She is oriented to person, place, and time. She appears well-developed and well-nourished.   HENT:   Head: Normocephalic and atraumatic.   Mouth/Throat: No oropharyngeal exudate.   Eyes: Conjunctivae are normal. Pupils are equal, round, and reactive to light.   Neck: Neck supple. No thyromegaly present.   Cardiovascular: Normal rate, regular rhythm and intact distal pulses.    Pulmonary/Chest: Effort normal and breath sounds normal.   Abdominal: Soft. Bowel sounds are normal. She exhibits no distension. There is no tenderness.   Musculoskeletal: She exhibits tenderness (low back tenderness). She exhibits no edema.   Neurological: She is alert and oriented to person, place, and time. No cranial nerve deficit.   Skin: Skin is warm and dry.   AV on face   Psychiatric: She has a normal mood and affect. Judgment normal.   Nursing note and vitals reviewed.        Results for orders placed or performed in visit on 08/16/17   TSH   Result Value Ref Range    TSH 1.315 0.350 - 5.350 mIU/mL   T4, Free   Result Value Ref Range    Free T4 1.32 0.89 - 1.76 ng/dL   Basic Metabolic Panel   Result Value Ref Range    Glucose 127 (H) 70 - 100 mg/dL    BUN 12 9 - 23 mg/dL    Creatinine 0.80 0.60 - 1.30 mg/dL    Sodium 138 132 - 146 mmol/L    Potassium 4.3 3.5 - 5.5 mmol/L    Chloride 111 (H) 99 - 109 mmol/L    CO2 29.0 20.0 - 31.0 mmol/L    Calcium 9.7 8.7 - 10.4 mg/dL    eGFR Non African Amer 80 >60 mL/min/1.73    BUN/Creatinine Ratio 15.0 7.0 - 25.0    Anion Gap -2.0 (L) 3.0 - 11.0 mmol/L   Vitamin B12   Result Value Ref Range    Vitamin B-12 952 (H) 211 - 911 pg/mL              Assessment/Plan   Diagnoses and all orders for this visit:    Reaction to chronic stress  -     FLUoxetine (PROzac) 20 MG tablet; Take 1 tablet by mouth Daily.    Bilateral low back pain with sciatica, sciatica laterality unspecified, unspecified chronicity  -     Discontinue: HYDROcodone-acetaminophen (NORCO)  MG per tablet; Take 1 tablet by mouth Every 6 (Six) Hours As Needed for Moderate Pain . Fill after 10/7/17  -     HYDROcodone-acetaminophen (NORCO)  MG per tablet; Take 1 tablet by mouth Every 6 (Six) Hours As Needed for Moderate Pain . Fill after 11/7/17  -     Urine Drug Screen - Urine, Clean Catch; Future      Pt. Reports that she dropped her urine cup with her uine, and is unable to void again, will recheck later and if continues to raise red flags, will refer to PTC.       The patient has read and signed the [facility] Controlled Substance Contract.  I will continue to see patient for regular follow up appointments.  They are well controlled on their medication.  MARJORIE has been reviewed by me and is updated every 3 months. The patient is aware of the potential for addiction and dependence.      Return in about 2 months (around 12/3/2017) for Next scheduled follow up.

## 2017-10-15 DIAGNOSIS — E03.9 ACQUIRED HYPOTHYROIDISM: ICD-10-CM

## 2017-10-15 RX ORDER — LEVOTHYROXINE SODIUM 88 MCG
TABLET ORAL
Qty: 90 TABLET | Refills: 0 | Status: SHIPPED | OUTPATIENT
Start: 2017-10-15 | End: 2018-01-14 | Stop reason: SDUPTHER

## 2017-12-05 ENCOUNTER — OFFICE VISIT (OUTPATIENT)
Dept: INTERNAL MEDICINE | Facility: CLINIC | Age: 37
End: 2017-12-05

## 2017-12-05 VITALS
DIASTOLIC BLOOD PRESSURE: 84 MMHG | WEIGHT: 166.4 LBS | BODY MASS INDEX: 27.69 KG/M2 | OXYGEN SATURATION: 99 % | HEART RATE: 87 BPM | SYSTOLIC BLOOD PRESSURE: 120 MMHG

## 2017-12-05 DIAGNOSIS — F43.89 REACTION TO CHRONIC STRESS: ICD-10-CM

## 2017-12-05 DIAGNOSIS — J30.2 SEASONAL ALLERGIC RHINITIS, UNSPECIFIED CHRONICITY, UNSPECIFIED TRIGGER: ICD-10-CM

## 2017-12-05 DIAGNOSIS — M54.40 BILATERAL LOW BACK PAIN WITH SCIATICA, SCIATICA LATERALITY UNSPECIFIED, UNSPECIFIED CHRONICITY: Primary | ICD-10-CM

## 2017-12-05 DIAGNOSIS — Z79.899 HIGH RISK MEDICATION USE: ICD-10-CM

## 2017-12-05 DIAGNOSIS — F17.200 CURRENT SMOKER: ICD-10-CM

## 2017-12-05 PROCEDURE — 99214 OFFICE O/P EST MOD 30 MIN: CPT | Performed by: INTERNAL MEDICINE

## 2017-12-05 RX ORDER — HYDROCODONE BITARTRATE AND ACETAMINOPHEN 10; 325 MG/1; MG/1
1 TABLET ORAL EVERY 6 HOURS PRN
Qty: 120 TABLET | Refills: 0 | Status: SHIPPED | OUTPATIENT
Start: 2017-12-05 | End: 2017-12-05 | Stop reason: SDUPTHER

## 2017-12-05 RX ORDER — HYDROCODONE BITARTRATE AND ACETAMINOPHEN 10; 325 MG/1; MG/1
1 TABLET ORAL EVERY 6 HOURS PRN
Qty: 120 TABLET | Refills: 0 | Status: SHIPPED | OUTPATIENT
Start: 2017-12-05 | End: 2018-02-06 | Stop reason: SDUPTHER

## 2017-12-05 NOTE — PROGRESS NOTES
Med Refill and Cough (past two weeks)    Subjective   Subha Patino is a 37 y.o. female is here today for follow-up.    History of Present Illness   Subha notes she has been coughing a lot for the last 2-3 weeks, took bactrim, which helped a little.  Here for f/u on her chronic stress and lbp. Stable on current regimen. Needs refills.  She is compliant with current regimen.      Current Outpatient Prescriptions:   •  Cyanocobalamin (VITAMIN B-12) 5000 MCG sublingual tablet, Take 1 tablet by mouth Daily., Disp: , Rfl: 3  •  cyclobenzaprine (FLEXERIL) 10 MG tablet, Take 1 tablet by mouth Daily., Disp: 30 tablet, Rfl: 4  •  FLUoxetine (PROzac) 20 MG tablet, Take 1 tablet by mouth Daily., Disp: 30 tablet, Rfl: 3  •  HYDROcodone-acetaminophen (NORCO)  MG per tablet, Take 1 tablet by mouth Every 6 (Six) Hours As Needed for Moderate Pain  Fill after 1/6/18, Disp: 120 tablet, Rfl: 0  •  loratadine (CLARITIN) 10 MG tablet, Take 1 tablet by mouth Daily., Disp: 90 tablet, Rfl: 1  •  medroxyPROGESTERone (DEPO-PROVERA) 400 MG/ML suspension injection, Apply  to cheek., Disp: , Rfl:   •  nicotine (NICODERM CQ) 14 MG/24HR patch, Place 1 patch on the skin Daily., Disp: 14 patch, Rfl: 3  •  spironolactone (ALDACTONE) 25 MG tablet, Take 2 tablets by mouth Daily., Disp: 60 tablet, Rfl: 5  •  SYNTHROID 88 MCG tablet, TAKE 1 TABLET DAILY, Disp: 90 tablet, Rfl: 0      The following portions of the patient's history were reviewed and updated as appropriate: allergies, current medications, past family history, past medical history, past social history, past surgical history and problem list.    Review of Systems   Constitutional: Positive for fatigue. Negative for chills and fever.   HENT: Positive for congestion. Negative for ear discharge, ear pain, sinus pressure and sore throat.    Respiratory: Positive for cough. Negative for chest tightness and shortness of breath.    Cardiovascular: Negative for chest pain, palpitations and leg  swelling.   Gastrointestinal: Negative for diarrhea, nausea and vomiting.   Musculoskeletal: Positive for arthralgias and back pain. Negative for myalgias.   Neurological: Negative for dizziness, syncope and headaches.   Psychiatric/Behavioral: Negative for confusion, dysphoric mood and sleep disturbance.       Objective   /84  Pulse 87  Wt 75.5 kg (166 lb 6.4 oz)  SpO2 99% Comment: ra  BMI 27.69 kg/m2  Physical Exam   Constitutional: She is oriented to person, place, and time. She appears well-developed and well-nourished.   HENT:   Head: Normocephalic and atraumatic.   Right Ear: Tympanic membrane is bulging.   Left Ear: Tympanic membrane is bulging.   Mouth/Throat: Posterior oropharyngeal erythema present. No oropharyngeal exudate or tonsillar abscesses.   Eyes: Pupils are equal, round, and reactive to light.   Neck: Normal range of motion.   Cardiovascular: Normal rate and regular rhythm.    Pulmonary/Chest: Effort normal and breath sounds normal. No stridor.   Abdominal: Soft. Bowel sounds are normal.   Musculoskeletal: She exhibits tenderness.   Lymphadenopathy:     She has no cervical adenopathy.   Neurological: She is alert and oriented to person, place, and time.   Skin: Skin is warm and dry.   Psychiatric: She has a normal mood and affect.         Results for orders placed or performed in visit on 08/16/17   TSH   Result Value Ref Range    TSH 1.315 0.350 - 5.350 mIU/mL   T4, Free   Result Value Ref Range    Free T4 1.32 0.89 - 1.76 ng/dL   Basic Metabolic Panel   Result Value Ref Range    Glucose 127 (H) 70 - 100 mg/dL    BUN 12 9 - 23 mg/dL    Creatinine 0.80 0.60 - 1.30 mg/dL    Sodium 138 132 - 146 mmol/L    Potassium 4.3 3.5 - 5.5 mmol/L    Chloride 111 (H) 99 - 109 mmol/L    CO2 29.0 20.0 - 31.0 mmol/L    Calcium 9.7 8.7 - 10.4 mg/dL    eGFR Non African Amer 80 >60 mL/min/1.73    BUN/Creatinine Ratio 15.0 7.0 - 25.0    Anion Gap -2.0 (L) 3.0 - 11.0 mmol/L   Vitamin B12   Result Value Ref  Range    Vitamin B-12 952 (H) 211 - 911 pg/mL             Assessment/Plan   Diagnoses and all orders for this visit:    Bilateral low back pain with sciatica, sciatica laterality unspecified, unspecified chronicity  -     Discontinue: HYDROcodone-acetaminophen (NORCO)  MG per tablet; Take 1 tablet by mouth Every 6 (Six) Hours As Needed for Moderate Pain  -     Discontinue: HYDROcodone-acetaminophen (NORCO)  MG per tablet; Take 1 tablet by mouth Every 6 (Six) Hours As Needed for Moderate Pain  -     HYDROcodone-acetaminophen (NORCO)  MG per tablet; Take 1 tablet by mouth Every 6 (Six) Hours As Needed for Moderate Pain  Fill after 1/6/18    Seasonal allergic rhinitis, unspecified chronicity, unspecified trigger  Comments:  continue flonase.    Reaction to chronic stress  Comments:  continue current meds, stable    Current smoker  Comments:  Counseled to quit                 Return in about 2 months (around 2/5/2018).

## 2018-01-14 DIAGNOSIS — E03.9 ACQUIRED HYPOTHYROIDISM: ICD-10-CM

## 2018-01-14 RX ORDER — LEVOTHYROXINE SODIUM 88 MCG
TABLET ORAL
Qty: 90 TABLET | Refills: 0 | Status: SHIPPED | OUTPATIENT
Start: 2018-01-14 | End: 2018-02-12 | Stop reason: SDUPTHER

## 2018-02-06 ENCOUNTER — OFFICE VISIT (OUTPATIENT)
Dept: INTERNAL MEDICINE | Facility: CLINIC | Age: 38
End: 2018-02-06

## 2018-02-06 VITALS
OXYGEN SATURATION: 98 % | BODY MASS INDEX: 26.99 KG/M2 | HEART RATE: 99 BPM | RESPIRATION RATE: 16 BRPM | WEIGHT: 162 LBS | HEIGHT: 65 IN | DIASTOLIC BLOOD PRESSURE: 78 MMHG | SYSTOLIC BLOOD PRESSURE: 110 MMHG

## 2018-02-06 DIAGNOSIS — E03.9 ACQUIRED HYPOTHYROIDISM: ICD-10-CM

## 2018-02-06 DIAGNOSIS — M54.40 BILATERAL LOW BACK PAIN WITH SCIATICA, SCIATICA LATERALITY UNSPECIFIED, UNSPECIFIED CHRONICITY: Primary | ICD-10-CM

## 2018-02-06 PROCEDURE — 99214 OFFICE O/P EST MOD 30 MIN: CPT | Performed by: INTERNAL MEDICINE

## 2018-02-06 RX ORDER — HYDROCODONE BITARTRATE AND ACETAMINOPHEN 10; 325 MG/1; MG/1
1 TABLET ORAL EVERY 6 HOURS PRN
Qty: 120 TABLET | Refills: 0 | Status: SHIPPED | OUTPATIENT
Start: 2018-02-06 | End: 2018-03-28 | Stop reason: SDUPTHER

## 2018-02-06 RX ORDER — HYDROCODONE BITARTRATE AND ACETAMINOPHEN 10; 325 MG/1; MG/1
1 TABLET ORAL EVERY 6 HOURS PRN
Qty: 120 TABLET | Refills: 0 | Status: SHIPPED | OUTPATIENT
Start: 2018-02-06 | End: 2018-02-06 | Stop reason: SDUPTHER

## 2018-02-06 NOTE — PROGRESS NOTES
Follow-up (refill)    Subjective   Subha Patino is a 37 y.o. female is here today for follow-up.    History of Present Illness   Has been on Tamiflu x 1 week, has had a low grade temp, but kids had the flu.  She has been doing well otw.  Here for f/u on chronic ain, her uds was consistent last visit.    Current Outpatient Prescriptions:   •  Cyanocobalamin (VITAMIN B-12) 5000 MCG sublingual tablet, DISSOLVE 1 TABLET UNDER THE TONGUE DAILY, Disp: 90 tablet, Rfl: 0  •  cyclobenzaprine (FLEXERIL) 10 MG tablet, Take 1 tablet by mouth Daily., Disp: 30 tablet, Rfl: 4  •  HYDROcodone-acetaminophen (NORCO)  MG per tablet, Take 1 tablet by mouth Every 6 (Six) Hours As Needed for Moderate Pain . Fill after 3/7/18, Disp: 120 tablet, Rfl: 0  •  loratadine (CLARITIN) 10 MG tablet, Take 1 tablet by mouth Daily., Disp: 90 tablet, Rfl: 1  •  medroxyPROGESTERone (DEPO-PROVERA) 400 MG/ML suspension injection, Apply  to cheek., Disp: , Rfl:   •  nicotine (NICODERM CQ) 14 MG/24HR patch, Place 1 patch on the skin Daily., Disp: 14 patch, Rfl: 3  •  spironolactone (ALDACTONE) 25 MG tablet, Take 2 tablets by mouth Daily., Disp: 60 tablet, Rfl: 5  •  SYNTHROID 88 MCG tablet, TAKE 1 TABLET DAILY, Disp: 90 tablet, Rfl: 0  •  FLUoxetine (PROzac) 20 MG tablet, Take 1 tablet by mouth Daily., Disp: 30 tablet, Rfl: 3      The following portions of the patient's history were reviewed and updated as appropriate: allergies, current medications, past family history, past medical history, past social history, past surgical history and problem list.    Review of Systems   Constitutional: Negative.  Negative for chills and fever.   HENT: Negative for ear discharge, ear pain, sinus pressure and sore throat.    Respiratory: Negative for cough, chest tightness and shortness of breath.    Cardiovascular: Negative for chest pain, palpitations and leg swelling.   Gastrointestinal: Negative for diarrhea, nausea and vomiting.   Musculoskeletal: Negative  "for arthralgias, back pain and myalgias.   Neurological: Negative for dizziness, syncope and headaches.   Psychiatric/Behavioral: Negative for confusion and sleep disturbance.       Objective   /78  Pulse 99  Resp 16  Ht 165.1 cm (65\")  Wt 73.5 kg (162 lb)  SpO2 98%  BMI 26.96 kg/m2  Physical Exam   Constitutional: She is oriented to person, place, and time. She appears well-developed and well-nourished.   HENT:   Head: Normocephalic and atraumatic.   Right Ear: External ear normal.   Left Ear: External ear normal.   Mouth/Throat: No oropharyngeal exudate.   Eyes: Conjunctivae are normal. Pupils are equal, round, and reactive to light.   Neck: Neck supple. No thyromegaly present.   Cardiovascular: Normal rate, regular rhythm and intact distal pulses.    Pulmonary/Chest: Effort normal and breath sounds normal.   Abdominal: Soft. Bowel sounds are normal. She exhibits no distension. There is no tenderness.   Musculoskeletal: She exhibits no edema.   Neurological: She is alert and oriented to person, place, and time. No cranial nerve deficit.   Skin: Skin is warm and dry.   Psychiatric: She has a normal mood and affect. Judgment normal.   Nursing note and vitals reviewed.        Results for orders placed or performed in visit on 08/16/17   TSH   Result Value Ref Range    TSH 1.315 0.350 - 5.350 mIU/mL   T4, Free   Result Value Ref Range    Free T4 1.32 0.89 - 1.76 ng/dL   Basic Metabolic Panel   Result Value Ref Range    Glucose 127 (H) 70 - 100 mg/dL    BUN 12 9 - 23 mg/dL    Creatinine 0.80 0.60 - 1.30 mg/dL    Sodium 138 132 - 146 mmol/L    Potassium 4.3 3.5 - 5.5 mmol/L    Chloride 111 (H) 99 - 109 mmol/L    CO2 29.0 20.0 - 31.0 mmol/L    Calcium 9.7 8.7 - 10.4 mg/dL    eGFR Non African Amer 80 >60 mL/min/1.73    BUN/Creatinine Ratio 15.0 7.0 - 25.0    Anion Gap -2.0 (L) 3.0 - 11.0 mmol/L   Vitamin B12   Result Value Ref Range    Vitamin B-12 952 (H) 211 - 911 pg/mL             Assessment/Plan "   Diagnoses and all orders for this visit:    Bilateral low back pain with sciatica, sciatica laterality unspecified, unspecified chronicity  -     Discontinue: HYDROcodone-acetaminophen (NORCO)  MG per tablet; Take 1 tablet by mouth Every 6 (Six) Hours As Needed for Moderate Pain .  -     HYDROcodone-acetaminophen (NORCO)  MG per tablet; Take 1 tablet by mouth Every 6 (Six) Hours As Needed for Moderate Pain . Fill after 3/7/18    Acquired hypothyroidism    samples of synthroid 88mcg given.         The patient has read and signed the [place] Controlled Substance Contract.  I will continue to see patient for regular follow up appointments.  They are well controlled on their medication.  MARJORIE has been reviewed by me and is updated every 3 months. The patient is aware of the potential for addiction and dependence.      Return in about 2 months (around 4/6/2018) for Annual physical.

## 2018-02-12 DIAGNOSIS — E03.9 ACQUIRED HYPOTHYROIDISM: ICD-10-CM

## 2018-02-12 RX ORDER — LEVOTHYROXINE SODIUM 88 MCG
88 TABLET ORAL DAILY
Qty: 90 TABLET | Refills: 0 | Status: SHIPPED | OUTPATIENT
Start: 2018-02-12 | End: 2018-04-17

## 2018-02-12 NOTE — TELEPHONE ENCOUNTER
PLEASE SEND SCRIPT FOR [facility] TO [facility].. WAS PREVIOUSLY SENT TO [facility] AND SHE WOULD LIKE IT TO BE SENT TO [facility] INSTEAD.

## 2018-03-28 DIAGNOSIS — M54.40 BILATERAL LOW BACK PAIN WITH SCIATICA, SCIATICA LATERALITY UNSPECIFIED, UNSPECIFIED CHRONICITY: ICD-10-CM

## 2018-03-28 RX ORDER — HYDROCODONE BITARTRATE AND ACETAMINOPHEN 10; 325 MG/1; MG/1
1 TABLET ORAL EVERY 6 HOURS PRN
Qty: 40 TABLET | Refills: 0 | Status: SHIPPED | OUTPATIENT
Start: 2018-03-28 | End: 2018-04-17 | Stop reason: SDUPTHER

## 2018-03-28 NOTE — TELEPHONE ENCOUNTER
Pt informed that script is ready for  in the front office. She stated she will be here to get the script next week.

## 2018-03-28 NOTE — TELEPHONE ENCOUNTER
PATIENT IS CALLING STATING SHE WILL RUN OUT OF HER PAIN MEDICATION ON 04/08/2018. SHE HAS AN APPOINTMENT ON 04/17/2018. SHE WILL NEED REFILLS TILL THIS APPOINTMENT. SHE NEEDS US TO HAVE A SCRIPT READY OR CALL INTO HER PHARMACY. YOU CAN REACH HER BACK -[phone]

## 2018-04-17 ENCOUNTER — OFFICE VISIT (OUTPATIENT)
Dept: INTERNAL MEDICINE | Facility: CLINIC | Age: 38
End: 2018-04-17

## 2018-04-17 ENCOUNTER — OFFICE VISIT (OUTPATIENT)
Dept: ENDOCRINOLOGY | Facility: CLINIC | Age: 38
End: 2018-04-17

## 2018-04-17 VITALS
OXYGEN SATURATION: 96 % | HEIGHT: 65 IN | SYSTOLIC BLOOD PRESSURE: 118 MMHG | DIASTOLIC BLOOD PRESSURE: 74 MMHG | HEART RATE: 102 BPM | WEIGHT: 165 LBS | BODY MASS INDEX: 27.49 KG/M2

## 2018-04-17 VITALS
HEIGHT: 65 IN | SYSTOLIC BLOOD PRESSURE: 118 MMHG | OXYGEN SATURATION: 96 % | DIASTOLIC BLOOD PRESSURE: 80 MMHG | BODY MASS INDEX: 26.82 KG/M2 | HEART RATE: 88 BPM | WEIGHT: 161 LBS

## 2018-04-17 DIAGNOSIS — L70.9 ACNE, UNSPECIFIED ACNE TYPE: ICD-10-CM

## 2018-04-17 DIAGNOSIS — L70.0 ACNE CYSTICA: ICD-10-CM

## 2018-04-17 DIAGNOSIS — F43.89 REACTION TO CHRONIC STRESS: ICD-10-CM

## 2018-04-17 DIAGNOSIS — Z79.899 HIGH RISK MEDICATION USE: ICD-10-CM

## 2018-04-17 DIAGNOSIS — E53.8 VITAMIN B12 DEFICIENCY: Primary | ICD-10-CM

## 2018-04-17 DIAGNOSIS — M54.40 BILATERAL LOW BACK PAIN WITH SCIATICA, SCIATICA LATERALITY UNSPECIFIED, UNSPECIFIED CHRONICITY: ICD-10-CM

## 2018-04-17 DIAGNOSIS — E04.1 NONTOXIC SINGLE THYROID NODULE: ICD-10-CM

## 2018-04-17 DIAGNOSIS — E78.5 DYSLIPIDEMIA: ICD-10-CM

## 2018-04-17 DIAGNOSIS — Z00.00 ANNUAL PHYSICAL EXAM: Primary | ICD-10-CM

## 2018-04-17 DIAGNOSIS — E03.9 ACQUIRED HYPOTHYROIDISM: ICD-10-CM

## 2018-04-17 LAB
25(OH)D3 SERPL-MCNC: 48.2 NG/ML
ALBUMIN SERPL-MCNC: 4.9 G/DL (ref 3.2–4.8)
ALBUMIN/GLOB SERPL: 2.2 G/DL (ref 1.5–2.5)
ALP SERPL-CCNC: 73 U/L (ref 25–100)
ALT SERPL W P-5'-P-CCNC: 23 U/L (ref 7–40)
ANION GAP SERPL CALCULATED.3IONS-SCNC: 7 MMOL/L (ref 3–11)
ARTICHOKE IGE QN: 73 MG/DL (ref 0–130)
AST SERPL-CCNC: 21 U/L (ref 0–33)
BILIRUB BLD-MCNC: NEGATIVE MG/DL
BILIRUB SERPL-MCNC: 0.7 MG/DL (ref 0.3–1.2)
BUN BLD-MCNC: 8 MG/DL (ref 9–23)
BUN/CREAT SERPL: 11.4 (ref 7–25)
CALCIUM SPEC-SCNC: 9.6 MG/DL (ref 8.7–10.4)
CHLORIDE SERPL-SCNC: 108 MMOL/L (ref 99–109)
CHOLEST SERPL-MCNC: 126 MG/DL (ref 0–200)
CLARITY, POC: CLEAR
CO2 SERPL-SCNC: 24 MMOL/L (ref 20–31)
COLOR UR: YELLOW
CREAT BLD-MCNC: 0.7 MG/DL (ref 0.6–1.3)
DEPRECATED RDW RBC AUTO: 42.1 FL (ref 37–54)
ERYTHROCYTE [DISTWIDTH] IN BLOOD BY AUTOMATED COUNT: 12 % (ref 11.3–14.5)
GFR SERPL CREATININE-BSD FRML MDRD: 94 ML/MIN/1.73
GLOBULIN UR ELPH-MCNC: 2.2 GM/DL
GLUCOSE BLD-MCNC: 73 MG/DL (ref 70–100)
GLUCOSE UR STRIP-MCNC: NEGATIVE MG/DL
HCT VFR BLD AUTO: 44.6 % (ref 34.5–44)
HDLC SERPL-MCNC: 43 MG/DL (ref 40–60)
HGB BLD-MCNC: 15.2 G/DL (ref 11.5–15.5)
KETONES UR QL: NEGATIVE
LEUKOCYTE EST, POC: NEGATIVE
MCH RBC QN AUTO: 32.8 PG (ref 27–31)
MCHC RBC AUTO-ENTMCNC: 34.1 G/DL (ref 32–36)
MCV RBC AUTO: 96.1 FL (ref 80–99)
NITRITE UR-MCNC: NEGATIVE MG/ML
PH UR: 7 [PH] (ref 5–8)
PLATELET # BLD AUTO: 300 10*3/MM3 (ref 150–450)
PMV BLD AUTO: 11.3 FL (ref 6–12)
POTASSIUM BLD-SCNC: 4.2 MMOL/L (ref 3.5–5.5)
PROT SERPL-MCNC: 7.1 G/DL (ref 5.7–8.2)
PROT UR STRIP-MCNC: NEGATIVE MG/DL
RBC # BLD AUTO: 4.64 10*6/MM3 (ref 3.89–5.14)
RBC # UR STRIP: NEGATIVE /UL
SODIUM BLD-SCNC: 139 MMOL/L (ref 132–146)
SP GR UR: 1 (ref 1–1.03)
TRIGL SERPL-MCNC: 110 MG/DL (ref 0–150)
TSH SERPL DL<=0.05 MIU/L-ACNC: 2.45 MIU/ML (ref 0.35–5.35)
UROBILINOGEN UR QL: NORMAL
WBC NRBC COR # BLD: 10.51 10*3/MM3 (ref 3.5–10.8)

## 2018-04-17 PROCEDURE — 99395 PREV VISIT EST AGE 18-39: CPT | Performed by: INTERNAL MEDICINE

## 2018-04-17 PROCEDURE — 99213 OFFICE O/P EST LOW 20 MIN: CPT | Performed by: INTERNAL MEDICINE

## 2018-04-17 PROCEDURE — 80053 COMPREHEN METABOLIC PANEL: CPT | Performed by: INTERNAL MEDICINE

## 2018-04-17 PROCEDURE — 81003 URINALYSIS AUTO W/O SCOPE: CPT | Performed by: INTERNAL MEDICINE

## 2018-04-17 PROCEDURE — 80061 LIPID PANEL: CPT | Performed by: INTERNAL MEDICINE

## 2018-04-17 PROCEDURE — 85027 COMPLETE CBC AUTOMATED: CPT | Performed by: INTERNAL MEDICINE

## 2018-04-17 PROCEDURE — 84443 ASSAY THYROID STIM HORMONE: CPT | Performed by: INTERNAL MEDICINE

## 2018-04-17 PROCEDURE — 82306 VITAMIN D 25 HYDROXY: CPT | Performed by: INTERNAL MEDICINE

## 2018-04-17 RX ORDER — LEVOTHYROXINE SODIUM 88 MCG
88 TABLET ORAL DAILY
Qty: 90 TABLET | Refills: 3 | Status: SHIPPED | OUTPATIENT
Start: 2018-04-17 | End: 2018-04-17

## 2018-04-17 RX ORDER — LEVOTHYROXINE SODIUM 88 MCG
88 TABLET ORAL DAILY
Qty: 90 TABLET | Refills: 3 | Status: SHIPPED | OUTPATIENT
Start: 2018-04-17 | End: 2018-12-11 | Stop reason: SDUPTHER

## 2018-04-17 RX ORDER — HYDROCODONE BITARTRATE AND ACETAMINOPHEN 10; 325 MG/1; MG/1
1 TABLET ORAL EVERY 6 HOURS PRN
Qty: 120 TABLET | Refills: 0 | Status: SHIPPED | OUTPATIENT
Start: 2018-04-17 | End: 2018-06-19 | Stop reason: SDUPTHER

## 2018-04-17 RX ORDER — LEVOTHYROXINE SODIUM 88 MCG
88 TABLET ORAL DAILY
Qty: 90 TABLET | Refills: 3 | Status: SHIPPED | OUTPATIENT
Start: 2018-04-17 | End: 2018-04-17 | Stop reason: SDUPTHER

## 2018-04-17 RX ORDER — SPIRONOLACTONE 50 MG/1
100 TABLET, FILM COATED ORAL DAILY
Qty: 60 TABLET | Refills: 5 | Status: SHIPPED | OUTPATIENT
Start: 2018-04-17 | End: 2018-10-20 | Stop reason: SDUPTHER

## 2018-04-17 RX ORDER — HYDROCODONE BITARTRATE AND ACETAMINOPHEN 10; 325 MG/1; MG/1
1 TABLET ORAL EVERY 6 HOURS PRN
Qty: 120 TABLET | Refills: 0 | Status: SHIPPED | OUTPATIENT
Start: 2018-04-17 | End: 2018-04-17 | Stop reason: SDUPTHER

## 2018-04-17 NOTE — PROGRESS NOTES
Chief complaint  Hypothyroidism (F/u for hypothyroidism, no recent labs. No new sx's or concerns)    Subjective   Subha Patino is a 37 y.o. female is here today for follow-up.    Hypothyroidism       Follow-up for  Hypothyroidism:  Current treatment includes Synthroid 88 mcg.     Previous meds: generic levothyroxine triggered palpitations, Tirosint was great but expensive. Synthroid seem to work best for her.   U/s thyroid in 2013 - small multiple nodules with largest measuring 3 mm.     Developed acne and started spironolactone - it is cleared now. Doing well, takes 50 mg daily.     Medications    Current Outpatient Prescriptions:   •  Cyanocobalamin (VITAMIN B-12) 5000 MCG sublingual tablet, Take 5,000 mcg by mouth Daily., Disp: 90 tablet, Rfl: 0  •  cyclobenzaprine (FLEXERIL) 10 MG tablet, Take 1 tablet by mouth Daily., Disp: 30 tablet, Rfl: 4  •  FLUoxetine (PROzac) 20 MG tablet, Take 1 tablet by mouth Daily., Disp: 30 tablet, Rfl: 3  •  HYDROcodone-acetaminophen (NORCO)  MG per tablet, Take 1 tablet by mouth Every 6 (Six) Hours As Needed for Moderate Pain . Fill after 4/7/18, Disp: 40 tablet, Rfl: 0  •  loratadine (CLARITIN) 10 MG tablet, Take 1 tablet by mouth Daily., Disp: 90 tablet, Rfl: 1  •  medroxyPROGESTERone (DEPO-PROVERA) 400 MG/ML suspension injection, Apply  to cheek., Disp: , Rfl:   •  nicotine (NICODERM CQ) 14 MG/24HR patch, Place 1 patch on the skin Daily., Disp: 14 patch, Rfl: 3  •  spironolactone (ALDACTONE) 25 MG tablet, Take 2 tablets by mouth Daily., Disp: 60 tablet, Rfl: 5  •  SYNTHROID 88 MCG tablet, Take 1 tablet by mouth Daily., Disp: 90 tablet, Rfl: 3    PMH  The following portions of the patient's history were reviewed and updated as appropriate: allergies, current medications, past family history, past medical history, past social history, past surgical history and problem list.    Review of systems  Review of Systems   Cardiovascular: Negative for palpitations (intermittent).  "  Psychiatric/Behavioral: The patient is not nervous/anxious.    All other systems reviewed and are negative.      Physical exam  Objective   Blood pressure 118/74, pulse 102, height 165.1 cm (65\"), weight 74.8 kg (165 lb), SpO2 96 %.   Physical Exam   Constitutional: She is oriented to person, place, and time. She appears well-developed and well-nourished.   HENT:   Head: Normocephalic and atraumatic.   Eyes: Conjunctivae are normal.   Neck: Normal range of motion. No muscular tenderness present. Carotid bruit is not present. No thyroid mass and no thyromegaly present.   The neck is supple and no assimetry visualized   Cardiovascular: Normal rate, regular rhythm and normal heart sounds.    Pulmonary/Chest: Effort normal and breath sounds normal.   Lymphadenopathy:     She has no cervical adenopathy.   Neurological: She is alert and oriented to person, place, and time.   Psychiatric: She has a normal mood and affect. Thought content normal.   Vitals reviewed.        LABS AND IMAGING  Lab Results   Component Value Date    TSH 1.315 08/16/2017           Assessment  Assessment/Plan   Problem List Items Addressed This Visit        Digestive    Vitamin B12 deficiency - Primary       Endocrine    Hypothyroidism    Relevant Medications    SYNTHROID 88 MCG tablet    Other Relevant Orders    TSH    Nontoxic single thyroid nodule    Relevant Medications    SYNTHROID 88 MCG tablet      Other Visit Diagnoses    None.         Plan  Synthroid 88 mcg, repeat labs for dose adjustments.  Continue spironolactone, it was started for acne.   Follow-up in 9-12 months  "

## 2018-04-17 NOTE — PROGRESS NOTES
Chief Complaint   Patient presents with   • Annual Exam       History of Present Illness  HM, Adult Female: The patient is being seen for a health maintenance and gynecology evaluation. The last health maintenance visit was 1 year(s) ago.   Social History: She is . Work status:full time- Small business owner- [employer]  She has never smoked. She reports never drinking alcohol. Denies any illicit drug use.  General Health: The patient's health is described as good. She has regular dental visits. She denies vision problems. She denies hearing loss. Immunizations status: up to date.   Lifestyle:. She consumes a diverse and healthy diet. She does not have any weight concerns. She exercises regularly. She denies tobacco. She denies alcohol use. She denies drug use.   Reproductive health:. She reports normal menses.   Screening: Cancer screening reviewed and current.   Metabolic screening reviewed and current.   Risk screening reviewed and current.     Review of Systems   Constitutional: Positive for fatigue. Negative for chills and fever.   HENT: Negative for ear discharge, ear pain, sinus pressure and sore throat.    Respiratory: Negative for cough, chest tightness and shortness of breath.    Cardiovascular: Negative for chest pain, palpitations and leg swelling.   Gastrointestinal: Negative for diarrhea, nausea and vomiting.   Genitourinary: Negative for decreased urine volume and menstrual problem.   Musculoskeletal: Positive for arthralgias and back pain. Negative for myalgias.   Neurological: Negative for dizziness, syncope and headaches.   Psychiatric/Behavioral: Negative for confusion and sleep disturbance.       Patient Active Problem List   Diagnosis   • Low back pain   • Reaction to chronic stress   • Hypothyroidism   • Actinic keratosis   • Allergic rhinitis   • Carpal tunnel syndrome   • Dyslipidemia   • Hyperglycemia   • Nontoxic single thyroid nodule   • Vitamin B12 deficiency   • Vitamin D deficiency    • Lump or mass in breast   • Current smoker   • Panic attacks       Social History     Social History   • Marital status:      Spouse name: N/A   • Number of children: N/A   • Years of education: N/A     Occupational History   • Not on file.     Social History Main Topics   • Smoking status: Current Every Day Smoker     Packs/day: 0.75     Years: 20.00     Types: Cigarettes   • Smokeless tobacco: Never Used   • Alcohol use Not on file      Comment: Beer or wine on occasion   • Drug use: No   • Sexual activity: Not on file     Other Topics Concern   • Not on file     Social History Narrative   • No narrative on file       Current Outpatient Prescriptions on File Prior to Visit   Medication Sig Dispense Refill   • Cyanocobalamin (VITAMIN B-12) 5000 MCG sublingual tablet Take 5,000 mcg by mouth Daily. 90 tablet 0   • cyclobenzaprine (FLEXERIL) 10 MG tablet Take 1 tablet by mouth Daily. 30 tablet 4   • loratadine (CLARITIN) 10 MG tablet Take 1 tablet by mouth Daily. 90 tablet 1   • medroxyPROGESTERone (DEPO-PROVERA) 400 MG/ML suspension injection Apply  to cheek.     • nicotine (NICODERM CQ) 14 MG/24HR patch Place 1 patch on the skin Daily. 14 patch 3   • SYNTHROID 88 MCG tablet Take 1 tablet by mouth Daily. 90 tablet 3   • [DISCONTINUED] FLUoxetine (PROzac) 20 MG tablet Take 1 tablet by mouth Daily. 30 tablet 3   • [DISCONTINUED] HYDROcodone-acetaminophen (NORCO)  MG per tablet Take 1 tablet by mouth Every 6 (Six) Hours As Needed for Moderate Pain . Fill after 4/7/18 40 tablet 0   • [DISCONTINUED] spironolactone (ALDACTONE) 25 MG tablet Take 2 tablets by mouth Daily. 60 tablet 5   • [DISCONTINUED] SYNTHROID 88 MCG tablet Take 1 tablet by mouth Daily. 90 tablet 0   • [DISCONTINUED] SYNTHROID 88 MCG tablet Take 1 tablet by mouth Daily. 90 tablet 3   • [DISCONTINUED] SYNTHROID 88 MCG tablet Take 1 tablet by mouth Daily. 90 tablet 3     No current facility-administered medications on file prior to visit.   "      Allergies   Allergen Reactions   • Robitussin (Alcohol Free) [Guaifenesin] Other (See Comments)     Tachycardia       /80   Pulse 88   Ht 165.1 cm (65\")   Wt 73 kg (161 lb)   SpO2 96%   BMI 26.79 kg/m²            The following portions of the patient's history were reviewed and updated as appropriate: allergies, current medications, past family history, past medical history, past social history, past surgical history and problem list.    Physical Exam   Constitutional: She is oriented to person, place, and time. She appears well-developed and well-nourished.   HENT:   Head: Normocephalic and atraumatic.   Right Ear: Hearing, tympanic membrane and external ear normal.   Left Ear: Hearing, tympanic membrane and external ear normal.   Nose: Nose normal. No mucosal edema or nasal deformity. No epistaxis.   Mouth/Throat: Oropharynx is clear and moist. Normal dentition.   Eyes: Conjunctivae and lids are normal.   Fundoscopic exam:       The right eye shows no exudate and no hemorrhage.        The left eye shows no exudate and no hemorrhage.   Neck: Trachea normal and full passive range of motion without pain. No JVD present. Carotid bruit is not present. No thyromegaly present.   Cardiovascular: Normal rate, regular rhythm, S1 normal, S2 normal, normal heart sounds and normal pulses.    Pulmonary/Chest: Breath sounds normal. No respiratory distress.   Abdominal: Soft. Normal appearance, normal aorta and bowel sounds are normal. There is no tenderness. There is no rigidity, no rebound, no guarding and no CVA tenderness.   Genitourinary: Rectal exam shows no external hemorrhoid, no internal hemorrhoid and guaiac negative stool. There is no rash on the right labia. There is no rash on the left labia. Uterus is not deviated, not enlarged and not tender. Cervix exhibits no motion tenderness and no discharge. Right adnexum displays no mass and no tenderness. Left adnexum displays no mass and no tenderness. No " tenderness in the vagina. No vaginal discharge found.   Musculoskeletal: Normal range of motion.   Lymphadenopathy:        Head (right side): No tonsillar adenopathy present.        Head (left side): No tonsillar adenopathy present.     She has no cervical adenopathy.     She has no axillary adenopathy.   Neurological: She is alert and oriented to person, place, and time. She has normal strength. No cranial nerve deficit or sensory deficit. She displays a negative Romberg sign.   Skin: No bruising and no rash noted. She is not diaphoretic. No cyanosis or erythema. No pallor. Nails show no clubbing.       Results for orders placed or performed in visit on 04/17/18   CBC (No Diff)   Result Value Ref Range    WBC 10.51 3.50 - 10.80 10*3/mm3    RBC 4.64 3.89 - 5.14 10*6/mm3    Hemoglobin 15.2 11.5 - 15.5 g/dL    Hematocrit 44.6 (H) 34.5 - 44.0 %    MCV 96.1 80.0 - 99.0 fL    MCH 32.8 (H) 27.0 - 31.0 pg    MCHC 34.1 32.0 - 36.0 g/dL    RDW 12.0 11.3 - 14.5 %    RDW-SD 42.1 37.0 - 54.0 fl    MPV 11.3 6.0 - 12.0 fL    Platelets 300 150 - 450 10*3/mm3   Comprehensive Metabolic Panel   Result Value Ref Range    Glucose 73 70 - 100 mg/dL    BUN 8 (L) 9 - 23 mg/dL    Creatinine 0.70 0.60 - 1.30 mg/dL    Sodium 139 132 - 146 mmol/L    Potassium 4.2 3.5 - 5.5 mmol/L    Chloride 108 99 - 109 mmol/L    CO2 24.0 20.0 - 31.0 mmol/L    Calcium 9.6 8.7 - 10.4 mg/dL    Total Protein 7.1 5.7 - 8.2 g/dL    Albumin 4.90 (H) 3.20 - 4.80 g/dL    ALT (SGPT) 23 7 - 40 U/L    AST (SGOT) 21 0 - 33 U/L    Alkaline Phosphatase 73 25 - 100 U/L    Total Bilirubin 0.7 0.3 - 1.2 mg/dL    eGFR Non African Amer 94 >60 mL/min/1.73    Globulin 2.2 gm/dL    A/G Ratio 2.2 1.5 - 2.5 g/dL    BUN/Creatinine Ratio 11.4 7.0 - 25.0    Anion Gap 7.0 3.0 - 11.0 mmol/L   Lipid Panel   Result Value Ref Range    Total Cholesterol 126 0 - 200 mg/dL    Triglycerides 110 0 - 150 mg/dL    HDL Cholesterol 43 40 - 60 mg/dL    LDL Cholesterol  73 0 - 130 mg/dL   Vitamin D  25 Hydroxy   Result Value Ref Range    25 Hydroxy, Vitamin D 48.2 ng/ml   POC Urinalysis Dipstick, Automated   Result Value Ref Range    Color Yellow Yellow, Straw, Dark Yellow, Tabby    Clarity, UA Clear Clear    Glucose, UA Negative Negative, 1000 mg/dL (3+) mg/dL    Bilirubin Negative Negative    Ketones, UA Negative Negative    Specific Gravity  1.005 1.005 - 1.030    Blood, UA Negative Negative    pH, Urine 7.0 5.0 - 8.0    Protein, POC Negative Negative mg/dL    Urobilinogen, UA Normal Normal    Leukocytes Negative Negative    Nitrite, UA Negative Negative       Subha was seen today for annual exam.    Diagnoses and all orders for this visit:    Annual physical exam  -     POC Urinalysis Dipstick, Automated  -     CBC (No Diff)  -     Comprehensive Metabolic Panel  -     Lipid Panel  -     Vitamin D 25 Hydroxy    Bilateral low back pain with sciatica, sciatica laterality unspecified, unspecified chronicity  -     Discontinue: HYDROcodone-acetaminophen (NORCO)  MG per tablet; Take 1 tablet by mouth Every 6 (Six) Hours As Needed for Moderate Pain .  -     HYDROcodone-acetaminophen (NORCO)  MG per tablet; Take 1 tablet by mouth Every 6 (Six) Hours As Needed for Moderate Pain . Fill after 5/16/18    High risk medication use  -     Urine Drug Screen - Urine, Clean Catch; Future  -     Urine Drug Screen - Urine, Clean Catch    Acne, unspecified acne type  -     spironolactone (ALDACTONE) 50 MG tablet; Take 2 tablets by mouth Daily.    Dyslipidemia    Reaction to chronic stress    Acne cystica  -     spironolactone (ALDACTONE) 50 MG tablet; Take 2 tablets by mouth Daily.      Stable on currnt regimen, of pain meds, denies side effects.    The patient has read and signed the [facility] Controlled Substance Contract.  I will continue to see patient for regular follow up appointments.  They are well controlled on their medication.  MARJORIE has been reviewed by me and is updated every 3 months. The patient  is aware of the potential for addiction and dependence.      Health Maintenance   Topic Date Due   • TDAP/TD VACCINES (1 - Tdap) 05/13/1998   • PAP SMEAR  05/10/2016   • PNEUMOCOCCAL VACCINE (19-64 MEDIUM RISK) (1 of 1 - PPSV23) 04/02/2020 (Originally 5/13/1998)   • INFLUENZA VACCINE  08/01/2018       Discussion/Summary  Impression: health maintenance visit. Currently, she eats an adequate diet and has an adequate exercise regimen.   Cervical cancer screening:Pap smear is current.   Breast cancer screening: mammogram is current. Repeat due at 40   Colorectal cancer screening: colonoscopy is not indicated.  Osteoporosis screening: Bone mineral density test is not indicated.   Screening lab work includes hemoglobin, glucose, lipid profile, thyroid function testing, 25-hydroxyvitamin D and urinalysis.   Immunizations are needed, immunizations will be given as outlined in the orders , pneumovax after 40.    Return in about 2 months (around 6/17/2018) for Next scheduled follow up.

## 2018-04-18 DIAGNOSIS — L70.9 ACNE, UNSPECIFIED ACNE TYPE: ICD-10-CM

## 2018-04-18 RX ORDER — SPIRONOLACTONE 25 MG/1
50 TABLET ORAL DAILY
Qty: 60 TABLET | Refills: 5 | OUTPATIENT
Start: 2018-04-18

## 2018-04-24 ENCOUNTER — TELEPHONE (OUTPATIENT)
Dept: ENDOCRINOLOGY | Facility: CLINIC | Age: 38
End: 2018-04-24

## 2018-06-19 ENCOUNTER — OFFICE VISIT (OUTPATIENT)
Dept: INTERNAL MEDICINE | Facility: CLINIC | Age: 38
End: 2018-06-19

## 2018-06-19 VITALS
BODY MASS INDEX: 25.81 KG/M2 | WEIGHT: 155.13 LBS | OXYGEN SATURATION: 99 % | SYSTOLIC BLOOD PRESSURE: 120 MMHG | HEART RATE: 76 BPM | DIASTOLIC BLOOD PRESSURE: 74 MMHG

## 2018-06-19 DIAGNOSIS — M54.40 BILATERAL LOW BACK PAIN WITH SCIATICA, SCIATICA LATERALITY UNSPECIFIED, UNSPECIFIED CHRONICITY: ICD-10-CM

## 2018-06-19 PROCEDURE — 99213 OFFICE O/P EST LOW 20 MIN: CPT | Performed by: INTERNAL MEDICINE

## 2018-06-19 RX ORDER — FLUTICASONE PROPIONATE 50 MCG
2 SPRAY, SUSPENSION (ML) NASAL DAILY
Refills: 11 | COMMUNITY
Start: 2018-05-21 | End: 2019-10-15 | Stop reason: SDUPTHER

## 2018-06-19 RX ORDER — HYDROCODONE BITARTRATE AND ACETAMINOPHEN 10; 325 MG/1; MG/1
1 TABLET ORAL EVERY 6 HOURS PRN
Qty: 120 TABLET | Refills: 0 | Status: SHIPPED | OUTPATIENT
Start: 2018-06-19 | End: 2018-06-19 | Stop reason: SDUPTHER

## 2018-06-19 RX ORDER — HYDROCODONE BITARTRATE AND ACETAMINOPHEN 10; 325 MG/1; MG/1
1 TABLET ORAL EVERY 6 HOURS PRN
Qty: 120 TABLET | Refills: 0 | Status: SHIPPED | OUTPATIENT
Start: 2018-06-19 | End: 2018-08-14 | Stop reason: SDUPTHER

## 2018-06-19 NOTE — PROGRESS NOTES
Follow-up (2 mo follow up on paiin meds)    Subjective   Subha Patino is a 38 y.o. female is here today for follow-up.    History of Present Illness   Here for a follow up on her chronic back pain, needs refills.  Planning for a tubal ligation, last depo was 3 mos ago.    Current Outpatient Prescriptions:   •  Cyanocobalamin (VITAMIN B-12) 5000 MCG sublingual tablet, Take 5,000 mcg by mouth Daily., Disp: 90 tablet, Rfl: 0  •  cyclobenzaprine (FLEXERIL) 10 MG tablet, Take 1 tablet by mouth Daily., Disp: 30 tablet, Rfl: 4  •  HYDROcodone-acetaminophen (NORCO)  MG per tablet, Take 1 tablet by mouth Every 6 (Six) Hours As Needed for Moderate Pain . Fill after 7/15/18, Disp: 120 tablet, Rfl: 0  •  loratadine (CLARITIN) 10 MG tablet, Take 1 tablet by mouth Daily., Disp: 90 tablet, Rfl: 1  •  spironolactone (ALDACTONE) 50 MG tablet, Take 2 tablets by mouth Daily., Disp: 60 tablet, Rfl: 5  •  SYNTHROID 88 MCG tablet, Take 1 tablet by mouth Daily., Disp: 90 tablet, Rfl: 3  •  fluticasone (FLONASE) 50 MCG/ACT nasal spray, 2 sprays by Each Nare route Daily., Disp: , Rfl: 11  •  medroxyPROGESTERone (DEPO-PROVERA) 400 MG/ML suspension injection, Apply  to cheek., Disp: , Rfl:   •  nicotine (NICODERM CQ) 14 MG/24HR patch, Place 1 patch on the skin Daily., Disp: 14 patch, Rfl: 3      The following portions of the patient's history were reviewed and updated as appropriate: allergies, current medications, past family history, past medical history, past social history, past surgical history and problem list.    Review of Systems   Constitutional: Negative.  Negative for chills and fever.   HENT: Negative for ear discharge, ear pain, sinus pressure and sore throat.    Respiratory: Negative for cough, chest tightness and shortness of breath.    Cardiovascular: Negative for chest pain, palpitations and leg swelling.   Gastrointestinal: Negative for diarrhea, nausea and vomiting.   Musculoskeletal: Positive for arthralgias and  back pain. Negative for myalgias.   Neurological: Negative for dizziness, syncope and headaches.   Psychiatric/Behavioral: Negative for confusion and sleep disturbance.       Objective   /74   Pulse 76   Wt 70.4 kg (155 lb 2 oz)   SpO2 99%   BMI 25.81 kg/m²   Physical Exam   Constitutional: She is oriented to person, place, and time. She appears well-developed and well-nourished.   HENT:   Head: Normocephalic and atraumatic.   Mouth/Throat: No oropharyngeal exudate.   Eyes: Conjunctivae are normal. Pupils are equal, round, and reactive to light.   Neck: Neck supple. No thyromegaly present.   Cardiovascular: Normal rate and regular rhythm.    Pulmonary/Chest: Effort normal and breath sounds normal.   Abdominal: Soft. Bowel sounds are normal. She exhibits no distension. There is no tenderness.   Musculoskeletal: She exhibits tenderness. She exhibits no edema.   Neurological: She is alert and oriented to person, place, and time. No cranial nerve deficit.   Skin: Skin is warm and dry.   Psychiatric: She has a normal mood and affect. Judgment normal.   Nursing note and vitals reviewed.        Results for orders placed or performed in visit on 04/17/18   CBC (No Diff)   Result Value Ref Range    WBC 10.51 3.50 - 10.80 10*3/mm3    RBC 4.64 3.89 - 5.14 10*6/mm3    Hemoglobin 15.2 11.5 - 15.5 g/dL    Hematocrit 44.6 (H) 34.5 - 44.0 %    MCV 96.1 80.0 - 99.0 fL    MCH 32.8 (H) 27.0 - 31.0 pg    MCHC 34.1 32.0 - 36.0 g/dL    RDW 12.0 11.3 - 14.5 %    RDW-SD 42.1 37.0 - 54.0 fl    MPV 11.3 6.0 - 12.0 fL    Platelets 300 150 - 450 10*3/mm3   Comprehensive Metabolic Panel   Result Value Ref Range    Glucose 73 70 - 100 mg/dL    BUN 8 (L) 9 - 23 mg/dL    Creatinine 0.70 0.60 - 1.30 mg/dL    Sodium 139 132 - 146 mmol/L    Potassium 4.2 3.5 - 5.5 mmol/L    Chloride 108 99 - 109 mmol/L    CO2 24.0 20.0 - 31.0 mmol/L    Calcium 9.6 8.7 - 10.4 mg/dL    Total Protein 7.1 5.7 - 8.2 g/dL    Albumin 4.90 (H) 3.20 - 4.80 g/dL     ALT (SGPT) 23 7 - 40 U/L    AST (SGOT) 21 0 - 33 U/L    Alkaline Phosphatase 73 25 - 100 U/L    Total Bilirubin 0.7 0.3 - 1.2 mg/dL    eGFR Non African Amer 94 >60 mL/min/1.73    Globulin 2.2 gm/dL    A/G Ratio 2.2 1.5 - 2.5 g/dL    BUN/Creatinine Ratio 11.4 7.0 - 25.0    Anion Gap 7.0 3.0 - 11.0 mmol/L   Lipid Panel   Result Value Ref Range    Total Cholesterol 126 0 - 200 mg/dL    Triglycerides 110 0 - 150 mg/dL    HDL Cholesterol 43 40 - 60 mg/dL    LDL Cholesterol  73 0 - 130 mg/dL   Vitamin D 25 Hydroxy   Result Value Ref Range    25 Hydroxy, Vitamin D 48.2 ng/ml   POC Urinalysis Dipstick, Automated   Result Value Ref Range    Color Yellow Yellow, Straw, Dark Yellow, Tabby    Clarity, UA Clear Clear    Glucose, UA Negative Negative, 1000 mg/dL (3+) mg/dL    Bilirubin Negative Negative    Ketones, UA Negative Negative    Specific Gravity  1.005 1.005 - 1.030    Blood, UA Negative Negative    pH, Urine 7.0 5.0 - 8.0    Protein, POC Negative Negative mg/dL    Urobilinogen, UA Normal Normal    Leukocytes Negative Negative    Nitrite, UA Negative Negative             Assessment/Plan   Diagnoses and all orders for this visit:    Bilateral low back pain with sciatica, sciatica laterality unspecified, unspecified chronicity  -     Discontinue: HYDROcodone-acetaminophen (NORCO)  MG per tablet; Take 1 tablet by mouth Every 6 (Six) Hours As Needed for Moderate Pain . Fill after 5/16/18  -     HYDROcodone-acetaminophen (NORCO)  MG per tablet; Take 1 tablet by mouth Every 6 (Six) Hours As Needed for Moderate Pain . Fill after 7/15/18    Other orders  -     fluticasone (FLONASE) 50 MCG/ACT nasal spray; 2 sprays by Each Nare route Daily.           The patient has read and signed the [facility] Controlled Substance Contract.  I will continue to see patient for regular follow up appointments.  They are well controlled on their medication.  MARJORIE has been reviewed by me and is updated every 3 months. The patient  is aware of the potential for addiction and dependence.        Return in about 2 months (around 8/19/2018) for Next scheduled follow up.

## 2018-08-14 ENCOUNTER — OFFICE VISIT (OUTPATIENT)
Dept: INTERNAL MEDICINE | Facility: CLINIC | Age: 38
End: 2018-08-14

## 2018-08-14 VITALS
WEIGHT: 154.6 LBS | SYSTOLIC BLOOD PRESSURE: 122 MMHG | BODY MASS INDEX: 25.73 KG/M2 | OXYGEN SATURATION: 98 % | DIASTOLIC BLOOD PRESSURE: 78 MMHG | HEART RATE: 88 BPM

## 2018-08-14 DIAGNOSIS — M54.40 BILATERAL LOW BACK PAIN WITH SCIATICA, SCIATICA LATERALITY UNSPECIFIED, UNSPECIFIED CHRONICITY: ICD-10-CM

## 2018-08-14 DIAGNOSIS — R35.0 FREQUENCY OF URINATION: Primary | ICD-10-CM

## 2018-08-14 LAB
BILIRUB BLD-MCNC: ABNORMAL MG/DL
CLARITY, POC: CLEAR
COLOR UR: YELLOW
GLUCOSE UR STRIP-MCNC: NEGATIVE MG/DL
KETONES UR QL: ABNORMAL
LEUKOCYTE EST, POC: NEGATIVE
NITRITE UR-MCNC: NEGATIVE MG/ML
PH UR: 5 [PH] (ref 5–8)
PROT UR STRIP-MCNC: NEGATIVE MG/DL
RBC # UR STRIP: NEGATIVE /UL
SP GR UR: 1.02 (ref 1–1.03)
UROBILINOGEN UR QL: ABNORMAL

## 2018-08-14 PROCEDURE — 99213 OFFICE O/P EST LOW 20 MIN: CPT | Performed by: INTERNAL MEDICINE

## 2018-08-14 PROCEDURE — 81003 URINALYSIS AUTO W/O SCOPE: CPT | Performed by: INTERNAL MEDICINE

## 2018-08-14 RX ORDER — HYDROCODONE BITARTRATE AND ACETAMINOPHEN 10; 325 MG/1; MG/1
1 TABLET ORAL EVERY 6 HOURS PRN
Qty: 120 TABLET | Refills: 0 | Status: SHIPPED | OUTPATIENT
Start: 2018-08-14 | End: 2018-10-16 | Stop reason: SDUPTHER

## 2018-08-14 RX ORDER — HYDROCODONE BITARTRATE AND ACETAMINOPHEN 10; 325 MG/1; MG/1
1 TABLET ORAL EVERY 6 HOURS PRN
Qty: 120 TABLET | Refills: 0 | Status: SHIPPED | OUTPATIENT
Start: 2018-08-14 | End: 2018-08-14 | Stop reason: SDUPTHER

## 2018-08-14 NOTE — PROGRESS NOTES
Follow-up (2 month, medcations) and Hypothyroidism    Subjective   Subha Patino is a 38 y.o. female is here today for follow-up.    History of Present Illness   Here for f/u on her Chronic low back pain, well controlled on current regimen, stands long hours at work and this has helped.  She is concerned about lice as was in contact with someone that had it 2 mos ago, had it checked since.  Had to postpone her tubal.having increased frequency.    Current Outpatient Prescriptions:   •  Cyanocobalamin (VITAMIN B-12) 5000 MCG sublingual tablet, Take 5,000 mcg by mouth Daily., Disp: 90 tablet, Rfl: 0  •  cyclobenzaprine (FLEXERIL) 10 MG tablet, Take 1 tablet by mouth Daily., Disp: 30 tablet, Rfl: 4  •  fluticasone (FLONASE) 50 MCG/ACT nasal spray, 2 sprays by Each Nare route Daily., Disp: , Rfl: 11  •  HYDROcodone-acetaminophen (NORCO)  MG per tablet, Take 1 tablet by mouth Every 6 (Six) Hours As Needed for Moderate Pain . Fill after 9/14/18, Disp: 120 tablet, Rfl: 0  •  loratadine (CLARITIN) 10 MG tablet, Take 1 tablet by mouth Daily., Disp: 90 tablet, Rfl: 1  •  nicotine (NICODERM CQ) 14 MG/24HR patch, Place 1 patch on the skin Daily., Disp: 14 patch, Rfl: 3  •  spironolactone (ALDACTONE) 50 MG tablet, Take 2 tablets by mouth Daily., Disp: 60 tablet, Rfl: 5  •  SYNTHROID 88 MCG tablet, Take 1 tablet by mouth Daily., Disp: 90 tablet, Rfl: 3      The following portions of the patient's history were reviewed and updated as appropriate: allergies, current medications, past family history, past medical history, past social history, past surgical history and problem list.    Review of Systems   Constitutional: Negative.  Negative for chills and fever.   HENT: Negative for ear discharge, ear pain, sinus pressure and sore throat.    Respiratory: Negative for cough, chest tightness and shortness of breath.    Cardiovascular: Negative for chest pain, palpitations and leg swelling.   Gastrointestinal: Negative for  diarrhea, nausea and vomiting.   Musculoskeletal: Positive for arthralgias and back pain. Negative for myalgias.   Neurological: Negative for dizziness, syncope and headaches.   Psychiatric/Behavioral: Negative for confusion and sleep disturbance.       Objective   /78   Pulse 88   Wt 70.1 kg (154 lb 9.6 oz)   SpO2 98%   BMI 25.73 kg/m²   Physical Exam   Constitutional: She is oriented to person, place, and time. She appears well-developed and well-nourished.   HENT:   Head: Normocephalic and atraumatic.   Mouth/Throat: No oropharyngeal exudate.   Cardiovascular: Normal rate and regular rhythm.    Pulmonary/Chest: Effort normal and breath sounds normal.   Musculoskeletal: She exhibits tenderness. She exhibits no edema.   Neurological: She is alert and oriented to person, place, and time. No cranial nerve deficit.   Psychiatric: She has a normal mood and affect. Judgment normal.   Nursing note and vitals reviewed.        Results for orders placed or performed in visit on 08/14/18   POCT urinalysis dipstick, automated   Result Value Ref Range    Color Yellow Yellow, Straw, Dark Yellow, Tabby    Clarity, UA Clear Clear    Specific Gravity  1.020 1.005 - 1.030    pH, Urine 5.0 5.0 - 8.0    Leukocytes Negative Negative    Nitrite, UA Negative Negative    Protein, POC Negative Negative mg/dL    Glucose, UA Negative Negative, 1000 mg/dL (3+) mg/dL    Ketones, UA 15 mg/dL (A) Negative    Urobilinogen, UA 1 E.U./dL  (A) Normal    Bilirubin 1 mg/dL (A) Negative    Blood, UA Negative Negative             Assessment/Plan   Diagnoses and all orders for this visit:    Frequency of urination  -     POCT urinalysis dipstick, automated    Bilateral low back pain with sciatica, sciatica laterality unspecified, unspecified chronicity  -     Discontinue: HYDROcodone-acetaminophen (NORCO)  MG per tablet; Take 1 tablet by mouth Every 6 (Six) Hours As Needed for Moderate Pain .  -     HYDROcodone-acetaminophen (NORCO)   MG per tablet; Take 1 tablet by mouth Every 6 (Six) Hours As Needed for Moderate Pain . Fill after 9/14/18      Scalp -ve for lice. Reassured    The patient has read and signed the [facility] Controlled Substance Contract.  I will continue to see patient for regular follow up appointments.  They are well controlled on their medication.  MARJORIE has been reviewed by me and is updated every 3 months. The patient is aware of the potential for addiction and dependence.         Return in about 2 months (around 10/14/2018) for Next scheduled follow up.

## 2018-09-13 ENCOUNTER — APPOINTMENT (OUTPATIENT)
Dept: PREADMISSION TESTING | Facility: HOSPITAL | Age: 38
End: 2018-09-13

## 2018-09-13 LAB
B-HCG UR QL: NEGATIVE
BACTERIA UR QL AUTO: ABNORMAL /HPF
BILIRUB UR QL STRIP: NEGATIVE
CLARITY UR: ABNORMAL
COLOR UR: YELLOW
DEPRECATED RDW RBC AUTO: 41.3 FL (ref 37–54)
ERYTHROCYTE [DISTWIDTH] IN BLOOD BY AUTOMATED COUNT: 11.8 % (ref 11.3–14.5)
GLUCOSE UR STRIP-MCNC: NEGATIVE MG/DL
HCT VFR BLD AUTO: 45.4 % (ref 34.5–44)
HGB BLD-MCNC: 15.7 G/DL (ref 11.5–15.5)
HGB UR QL STRIP.AUTO: NEGATIVE
HYALINE CASTS UR QL AUTO: ABNORMAL /LPF
KETONES UR QL STRIP: ABNORMAL
LEUKOCYTE ESTERASE UR QL STRIP.AUTO: NEGATIVE
MCH RBC QN AUTO: 32.9 PG (ref 27–31)
MCHC RBC AUTO-ENTMCNC: 34.6 G/DL (ref 32–36)
MCV RBC AUTO: 95.2 FL (ref 80–99)
NITRITE UR QL STRIP: NEGATIVE
PH UR STRIP.AUTO: 5.5 [PH] (ref 5–8)
PLATELET # BLD AUTO: 245 10*3/MM3 (ref 150–450)
PMV BLD AUTO: 10.1 FL (ref 6–12)
PROT UR QL STRIP: ABNORMAL
RBC # BLD AUTO: 4.77 10*6/MM3 (ref 3.89–5.14)
RBC # UR: ABNORMAL /HPF
REF LAB TEST METHOD: ABNORMAL
SP GR UR STRIP: 1.03 (ref 1–1.03)
SQUAMOUS #/AREA URNS HPF: ABNORMAL /HPF
T4 SERPL-MCNC: 11.7 MCG/DL (ref 4.7–11.4)
TSH SERPL DL<=0.05 MIU/L-ACNC: 2.49 MIU/ML (ref 0.35–5.35)
UROBILINOGEN UR QL STRIP: ABNORMAL
WBC NRBC COR # BLD: 11.33 10*3/MM3 (ref 3.5–10.8)
WBC UR QL AUTO: ABNORMAL /HPF

## 2018-09-13 PROCEDURE — 85027 COMPLETE CBC AUTOMATED: CPT | Performed by: OBSTETRICS & GYNECOLOGY

## 2018-09-13 PROCEDURE — 81001 URINALYSIS AUTO W/SCOPE: CPT | Performed by: OBSTETRICS & GYNECOLOGY

## 2018-09-13 PROCEDURE — 84443 ASSAY THYROID STIM HORMONE: CPT | Performed by: OBSTETRICS & GYNECOLOGY

## 2018-09-13 PROCEDURE — 81025 URINE PREGNANCY TEST: CPT | Performed by: OBSTETRICS & GYNECOLOGY

## 2018-09-13 PROCEDURE — 84436 ASSAY OF TOTAL THYROXINE: CPT | Performed by: OBSTETRICS & GYNECOLOGY

## 2018-09-13 PROCEDURE — 36415 COLL VENOUS BLD VENIPUNCTURE: CPT

## 2018-09-14 ENCOUNTER — TELEPHONE (OUTPATIENT)
Dept: INTERNAL MEDICINE | Facility: CLINIC | Age: 38
End: 2018-09-14

## 2018-09-14 NOTE — TELEPHONE ENCOUNTER
OUTPATIENT SURGERY CALLED AND WANTED TO LET  KNOW THAT SHE JUST HAD A TUBAL FOR STERILIZATION AND THE  PRESCRIBED PERCOCET 5MG TABLETS; SHE CAN TAKE 1 EVERY 4 HOURS FOR PAIN.

## 2018-10-16 ENCOUNTER — OFFICE VISIT (OUTPATIENT)
Dept: INTERNAL MEDICINE | Facility: CLINIC | Age: 38
End: 2018-10-16

## 2018-10-16 VITALS
BODY MASS INDEX: 26.09 KG/M2 | OXYGEN SATURATION: 98 % | HEART RATE: 79 BPM | SYSTOLIC BLOOD PRESSURE: 120 MMHG | WEIGHT: 156.8 LBS | DIASTOLIC BLOOD PRESSURE: 90 MMHG

## 2018-10-16 DIAGNOSIS — F17.200 CURRENT SMOKER: ICD-10-CM

## 2018-10-16 DIAGNOSIS — N39.0 POSTCOITAL URINARY TRACT INFECTION: ICD-10-CM

## 2018-10-16 DIAGNOSIS — M54.40 BILATERAL LOW BACK PAIN WITH SCIATICA, SCIATICA LATERALITY UNSPECIFIED, UNSPECIFIED CHRONICITY: Primary | ICD-10-CM

## 2018-10-16 PROCEDURE — 90471 IMMUNIZATION ADMIN: CPT | Performed by: INTERNAL MEDICINE

## 2018-10-16 PROCEDURE — 99214 OFFICE O/P EST MOD 30 MIN: CPT | Performed by: INTERNAL MEDICINE

## 2018-10-16 PROCEDURE — 90674 CCIIV4 VAC NO PRSV 0.5 ML IM: CPT | Performed by: INTERNAL MEDICINE

## 2018-10-16 RX ORDER — HYDROCODONE BITARTRATE AND ACETAMINOPHEN 10; 325 MG/1; MG/1
1 TABLET ORAL EVERY 6 HOURS PRN
Qty: 120 TABLET | Refills: 0 | Status: SHIPPED | OUTPATIENT
Start: 2018-10-16 | End: 2018-12-11 | Stop reason: SDUPTHER

## 2018-10-16 RX ORDER — HYDROCODONE BITARTRATE AND ACETAMINOPHEN 10; 325 MG/1; MG/1
1 TABLET ORAL EVERY 6 HOURS PRN
Qty: 120 TABLET | Refills: 0 | Status: SHIPPED | OUTPATIENT
Start: 2018-10-16 | End: 2018-10-16 | Stop reason: SDUPTHER

## 2018-10-16 RX ORDER — VARENICLINE TARTRATE 1 MG/1
1 TABLET, FILM COATED ORAL 2 TIMES DAILY
Qty: 60 TABLET | Refills: 1 | Status: SHIPPED | OUTPATIENT
Start: 2018-10-16 | End: 2018-11-15 | Stop reason: SDUPTHER

## 2018-10-16 NOTE — PROGRESS NOTES
Back Pain (medication refills); Sterilization (had her post op today); and Pelvic Pain (after sex)    Subjective   Subha Patino is a 38 y.o. female is here today for follow-up.    History of Present Illness   Subha is here for a follow up on her back pain.  Off the depo shots and is s/p tubal.   Stays tired.  Also notes pain in her pelvic area after IOC. Unsure if its the lube?  S/p tooth extraction, now has an odor  Feels like food is stuck in the extracted cavity.    Current Outpatient Prescriptions:   •  Cyanocobalamin (VITAMIN B-12) 5000 MCG sublingual tablet, Take 5,000 mcg by mouth Daily., Disp: 90 tablet, Rfl: 0  •  cyclobenzaprine (FLEXERIL) 10 MG tablet, Take 1 tablet by mouth Daily., Disp: 30 tablet, Rfl: 4  •  fluticasone (FLONASE) 50 MCG/ACT nasal spray, 2 sprays by Each Nare route Daily., Disp: , Rfl: 11  •  HYDROcodone-acetaminophen (NORCO)  MG per tablet, Take 1 tablet by mouth Every 6 (Six) Hours As Needed for Moderate Pain . Fill after 11/15/18, Disp: 120 tablet, Rfl: 0  •  ibuprofen (ADVIL,MOTRIN) 600 MG tablet, Take 1 tablet by mouth Every 6 (Six) Hours., Disp: 20 tablet, Rfl: 0  •  loratadine (CLARITIN) 10 MG tablet, Take 1 tablet by mouth Daily., Disp: 90 tablet, Rfl: 1  •  spironolactone (ALDACTONE) 50 MG tablet, Take 2 tablets by mouth Daily., Disp: 60 tablet, Rfl: 5  •  SYNTHROID 88 MCG tablet, Take 1 tablet by mouth Daily., Disp: 90 tablet, Rfl: 3  •  nicotine (NICODERM CQ) 14 MG/24HR patch, Place 1 patch on the skin Daily., Disp: 14 patch, Rfl: 3  •  varenicline (CHANTIX CONTINUING MONTH PAK) 1 MG tablet, Take 1 tablet by mouth 2 (Two) Times a Day., Disp: 60 tablet, Rfl: 1  •  varenicline (CHANTIX STARTING MONTH PAK) 0.5 MG X 11 & 1 MG X 42 tablet, Take 0.5 mg one daily on days 1-2 and 0.5 mg twice daily on days 4-7. Then 1 mg twice daily for a total of 12 weeks., Disp: 53 tablet, Rfl: 0      The following portions of the patient's history were reviewed and updated as appropriate:  allergies, current medications, past family history, past medical history, past social history, past surgical history and problem list.    Review of Systems   Constitutional: Positive for fatigue. Negative for chills and fever.   HENT: Positive for dental problem. Negative for ear discharge, ear pain, sinus pressure and sore throat.    Respiratory: Negative for cough, chest tightness and shortness of breath.    Cardiovascular: Negative for chest pain, palpitations and leg swelling.   Gastrointestinal: Negative for diarrhea, nausea and vomiting.   Genitourinary: Positive for pelvic pain.   Musculoskeletal: Positive for arthralgias and back pain. Negative for myalgias.   Neurological: Negative for dizziness, syncope and headaches.   Psychiatric/Behavioral: Negative for confusion and sleep disturbance.       Objective   /90   Pulse 79   Wt 71.1 kg (156 lb 12.8 oz)   SpO2 98% Comment: ra  BMI 26.09 kg/m²   Physical Exam   Constitutional: She is oriented to person, place, and time. She appears well-developed and well-nourished.   HENT:   Head: Normocephalic and atraumatic.   Right Ear: External ear normal.   Left Ear: External ear normal.   Mouth/Throat: No oropharyngeal exudate.   Small food residue in the extracted cavity   Neck: Neck supple. No thyromegaly present.   Cardiovascular: Normal rate and regular rhythm.    Pulmonary/Chest: Effort normal and breath sounds normal.   Abdominal: Soft. Bowel sounds are normal. She exhibits no distension. There is no tenderness.   Musculoskeletal: She exhibits tenderness. She exhibits no edema.   Neurological: She is alert and oriented to person, place, and time. No cranial nerve deficit.   Skin: Skin is warm and dry.   Psychiatric: She has a normal mood and affect. Judgment normal.   Nursing note and vitals reviewed.        Results for orders placed or performed in visit on 09/13/18   CBC (No Diff)   Result Value Ref Range    WBC 11.33 (H) 3.50 - 10.80 10*3/mm3    RBC  4.77 3.89 - 5.14 10*6/mm3    Hemoglobin 15.7 (H) 11.5 - 15.5 g/dL    Hematocrit 45.4 (H) 34.5 - 44.0 %    MCV 95.2 80.0 - 99.0 fL    MCH 32.9 (H) 27.0 - 31.0 pg    MCHC 34.6 32.0 - 36.0 g/dL    RDW 11.8 11.3 - 14.5 %    RDW-SD 41.3 37.0 - 54.0 fl    MPV 10.1 6.0 - 12.0 fL    Platelets 245 150 - 450 10*3/mm3   Urinalysis With Culture If Indicated - Urine, Clean Catch   Result Value Ref Range    Color, UA Yellow Yellow, Straw    Appearance, UA Cloudy (A) Clear    pH, UA 5.5 5.0 - 8.0    Specific Gravity, UA 1.032 (H) 1.001 - 1.030    Glucose, UA Negative Negative    Ketones, UA Trace (A) Negative    Bilirubin, UA Negative Negative    Blood, UA Negative Negative    Protein, UA Trace (A) Negative    Leuk Esterase, UA Negative Negative    Nitrite, UA Negative Negative    Urobilinogen, UA 1.0 E.U./dL 0.2 - 1.0 E.U./dL   Pregnancy, Urine - Urine, Clean Catch   Result Value Ref Range    HCG, Urine QL Negative Negative   TSH   Result Value Ref Range    TSH 2.488 0.350 - 5.350 mIU/mL   T4   Result Value Ref Range    T4, Total 11.70 (H) 4.70 - 11.40 mcg/dL   Urinalysis, Microscopic Only - Urine, Clean Catch   Result Value Ref Range    RBC, UA 3-6 (A) None Seen, 0-2 /HPF    WBC, UA 3-5 (A) None Seen, 0-2 /HPF    Bacteria, UA None Seen None Seen, Trace /HPF    Squamous Epithelial Cells, UA 13-20 (A) None Seen, 0-2 /HPF    Hyaline Casts, UA 0-6 0 - 6 /LPF    Methodology Manual Light Microscopy              Assessment/Plan   Diagnoses and all orders for this visit:    Bilateral low back pain with sciatica, sciatica laterality unspecified, unspecified chronicity  -     Discontinue: HYDROcodone-acetaminophen (NORCO)  MG per tablet; Take 1 tablet by mouth Every 6 (Six) Hours As Needed for Moderate Pain .  -     HYDROcodone-acetaminophen (NORCO)  MG per tablet; Take 1 tablet by mouth Every 6 (Six) Hours As Needed for Moderate Pain . Fill after 11/15/18    Current smoker  -     varenicline (CHANTIX STARTING MONTH PAK) 0.5  MG X 11 & 1 MG X 42 tablet; Take 0.5 mg one daily on days 1-2 and 0.5 mg twice daily on days 4-7. Then 1 mg twice daily for a total of 12 weeks.  -     varenicline (CHANTIX CONTINUING MONTH TONA) 1 MG tablet; Take 1 tablet by mouth 2 (Two) Times a Day.    Postcoital urinary tract infection  Comments:  will change lube, and if not better, may need abx post IC.    Other orders  -     Flucelvax Quad (Vial) =>4 yrs ([id number])    adv. To use baby toothbrush in the extracted area to help keep it clean.       The patient has read and signed the [facility] Controlled Substance Contract.  I will continue to see patient for regular follow up appointments.  They are well controlled on their medication.  MARJORIE has been reviewed by me and is updated every 3 months. The patient is aware of the potential for addiction and dependence.        Return in about 2 months (around 12/16/2018).

## 2018-10-20 DIAGNOSIS — L70.9 ACNE, UNSPECIFIED ACNE TYPE: ICD-10-CM

## 2018-10-20 DIAGNOSIS — L70.0 ACNE CYSTICA: ICD-10-CM

## 2018-10-21 RX ORDER — SPIRONOLACTONE 50 MG/1
100 TABLET, FILM COATED ORAL DAILY
Qty: 60 TABLET | Refills: 5 | Status: SHIPPED | OUTPATIENT
Start: 2018-10-21 | End: 2019-04-16 | Stop reason: SDUPTHER

## 2018-11-15 DIAGNOSIS — F17.200 CURRENT SMOKER: ICD-10-CM

## 2018-11-15 RX ORDER — VARENICLINE TARTRATE 1 MG/1
1 TABLET, FILM COATED ORAL 2 TIMES DAILY
Qty: 60 TABLET | Refills: 1 | Status: SHIPPED | OUTPATIENT
Start: 2018-11-15 | End: 2018-12-20 | Stop reason: SDUPTHER

## 2018-11-15 NOTE — TELEPHONE ENCOUNTER
PATIENT IS ON DAY 26 TAKING THE CHANTEX PRESCRIPTION. SHE NEEDS THE SECOND SET OF CHANTIX TO CONTINUE NOT SMOKING. SHE CLAIMS THIS MEDICATION IS HELPING AND WOULD LIKE TO HAVE THIS CALLED INTO HER PHARMACY IN ORDER TO STAY ON TRACK OF NOT SMOKING. PATIENTS PHONE NUMBER [phone]

## 2018-12-11 ENCOUNTER — OFFICE VISIT (OUTPATIENT)
Dept: INTERNAL MEDICINE | Facility: CLINIC | Age: 38
End: 2018-12-11

## 2018-12-11 VITALS
HEIGHT: 65 IN | BODY MASS INDEX: 27.12 KG/M2 | DIASTOLIC BLOOD PRESSURE: 86 MMHG | SYSTOLIC BLOOD PRESSURE: 136 MMHG | HEART RATE: 71 BPM | WEIGHT: 162.8 LBS | OXYGEN SATURATION: 99 %

## 2018-12-11 DIAGNOSIS — M54.40 BILATERAL LOW BACK PAIN WITH SCIATICA, SCIATICA LATERALITY UNSPECIFIED, UNSPECIFIED CHRONICITY: ICD-10-CM

## 2018-12-11 DIAGNOSIS — E03.9 ACQUIRED HYPOTHYROIDISM: ICD-10-CM

## 2018-12-11 PROCEDURE — 90632 HEPA VACCINE ADULT IM: CPT | Performed by: INTERNAL MEDICINE

## 2018-12-11 PROCEDURE — 99213 OFFICE O/P EST LOW 20 MIN: CPT | Performed by: INTERNAL MEDICINE

## 2018-12-11 PROCEDURE — 90471 IMMUNIZATION ADMIN: CPT | Performed by: INTERNAL MEDICINE

## 2018-12-11 RX ORDER — LEVOTHYROXINE SODIUM 88 MCG
88 TABLET ORAL DAILY
Qty: 90 TABLET | Refills: 3 | Status: SHIPPED | OUTPATIENT
Start: 2018-12-11 | End: 2019-04-22 | Stop reason: SDUPTHER

## 2018-12-11 RX ORDER — HYDROCODONE BITARTRATE AND ACETAMINOPHEN 10; 325 MG/1; MG/1
1 TABLET ORAL EVERY 6 HOURS PRN
Qty: 120 TABLET | Refills: 0 | Status: SHIPPED | OUTPATIENT
Start: 2018-12-11 | End: 2018-12-11 | Stop reason: SDUPTHER

## 2018-12-11 RX ORDER — HYDROCODONE BITARTRATE AND ACETAMINOPHEN 10; 325 MG/1; MG/1
1 TABLET ORAL EVERY 6 HOURS PRN
Qty: 120 TABLET | Refills: 0 | Status: SHIPPED | OUTPATIENT
Start: 2018-12-11 | End: 2019-02-12 | Stop reason: SDUPTHER

## 2018-12-11 NOTE — PROGRESS NOTES
Nicotine Dependence (quit 1 month ago) and Back Pain    Subjective   Subha Patino is a 38 y.o. female is here today for follow-up.    History of Present Illness   Quit smoking x 1 month. On chantix, having side effects , but still taking it.  Gaining weight.    Here for f/u on her chronic LBP, for which she is on opioids, under good control. 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      Current Outpatient Medications:   •  Cyanocobalamin (VITAMIN B-12) 5000 MCG sublingual tablet, Take 5,000 mcg by mouth Daily., Disp: 90 tablet, Rfl: 1  •  cyclobenzaprine (FLEXERIL) 10 MG tablet, Take 1 tablet by mouth Daily., Disp: 30 tablet, Rfl: 4  •  fluticasone (FLONASE) 50 MCG/ACT nasal spray, 2 sprays by Each Nare route Daily., Disp: , Rfl: 11  •  HYDROcodone-acetaminophen (NORCO)  MG per tablet, Take 1 tablet by mouth Every 6 (Six) Hours As Needed for Moderate Pain . Fill after 1/13/19, Disp: 120 tablet, Rfl: 0  •  loratadine (CLARITIN) 10 MG tablet, Take 1 tablet by mouth Daily., Disp: 90 tablet, Rfl: 1  •  spironolactone (ALDACTONE) 50 MG tablet, TAKE 2 TABLETS BY MOUTH DAILY., Disp: 60 tablet, Rfl: 5  •  SYNTHROID 88 MCG tablet, Take 1 tablet by mouth Daily., Disp: 90 tablet, Rfl: 3  •  varenicline (CHANTIX CONTINUING MONTH TONA) 1 MG tablet, Take 1 tablet by mouth 2 (Two) Times a Day., Disp: 60 tablet, Rfl: 1      The following portions of the patient's history were reviewed and updated as appropriate: allergies, current medications, past family history, past medical history, past social history, past surgical history and problem list.    Review of Systems   Constitutional: Positive for unexpected weight change. Negative for chills and fever.   HENT: Negative for ear discharge, ear pain, sinus pressure and sore throat.    Respiratory: Negative for cough, chest tightness and shortness of breath.    Cardiovascular: Negative for chest pain, palpitations and leg swelling.  "  Gastrointestinal: Negative for diarrhea, nausea and vomiting.   Musculoskeletal: Positive for arthralgias and back pain. Negative for myalgias.   Neurological: Negative for dizziness, syncope and headaches.   Psychiatric/Behavioral: Negative for confusion and sleep disturbance.       Objective   /86   Pulse 71   Ht 165.1 cm (65\")   Wt 73.8 kg (162 lb 12.8 oz)   SpO2 99% Comment: ra  BMI 27.09 kg/m²   Physical Exam   Constitutional: She is oriented to person, place, and time. She appears well-developed and well-nourished.   HENT:   Head: Normocephalic and atraumatic.   Right Ear: External ear normal.   Left Ear: External ear normal.   Mouth/Throat: No oropharyngeal exudate.   Eyes: Conjunctivae are normal. Pupils are equal, round, and reactive to light.   Cardiovascular: Normal rate and regular rhythm.   Pulmonary/Chest: Effort normal and breath sounds normal.   Abdominal: Soft. Bowel sounds are normal. She exhibits no distension. There is no tenderness.   Musculoskeletal: She exhibits tenderness. She exhibits no edema.   Neurological: She is alert and oriented to person, place, and time. No cranial nerve deficit.   Skin: Skin is warm and dry.   Psychiatric: She has a normal mood and affect. Judgment normal.   Nursing note and vitals reviewed.        Results for orders placed or performed in visit on 09/13/18   CBC (No Diff)   Result Value Ref Range    WBC 11.33 (H) 3.50 - 10.80 10*3/mm3    RBC 4.77 3.89 - 5.14 10*6/mm3    Hemoglobin 15.7 (H) 11.5 - 15.5 g/dL    Hematocrit 45.4 (H) 34.5 - 44.0 %    MCV 95.2 80.0 - 99.0 fL    MCH 32.9 (H) 27.0 - 31.0 pg    MCHC 34.6 32.0 - 36.0 g/dL    RDW 11.8 11.3 - 14.5 %    RDW-SD 41.3 37.0 - 54.0 fl    MPV 10.1 6.0 - 12.0 fL    Platelets 245 150 - 450 10*3/mm3   Urinalysis With Culture If Indicated - Urine, Clean Catch   Result Value Ref Range    Color, UA Yellow Yellow, Straw    Appearance, UA Cloudy (A) Clear    pH, UA 5.5 5.0 - 8.0    Specific Gravity, UA 1.032 " (H) 1.001 - 1.030    Glucose, UA Negative Negative    Ketones, UA Trace (A) Negative    Bilirubin, UA Negative Negative    Blood, UA Negative Negative    Protein, UA Trace (A) Negative    Leuk Esterase, UA Negative Negative    Nitrite, UA Negative Negative    Urobilinogen, UA 1.0 E.U./dL 0.2 - 1.0 E.U./dL   Pregnancy, Urine - Urine, Clean Catch   Result Value Ref Range    HCG, Urine QL Negative Negative   TSH   Result Value Ref Range    TSH 2.488 0.350 - 5.350 mIU/mL   T4   Result Value Ref Range    T4, Total 11.70 (H) 4.70 - 11.40 mcg/dL   Urinalysis, Microscopic Only - Urine, Clean Catch   Result Value Ref Range    RBC, UA 3-6 (A) None Seen, 0-2 /HPF    WBC, UA 3-5 (A) None Seen, 0-2 /HPF    Bacteria, UA None Seen None Seen, Trace /HPF    Squamous Epithelial Cells, UA 13-20 (A) None Seen, 0-2 /HPF    Hyaline Casts, UA 0-6 0 - 6 /LPF    Methodology Manual Light Microscopy              Assessment/Plan   Diagnoses and all orders for this visit:    Acquired hypothyroidism  -     SYNTHROID 88 MCG tablet; Take 1 tablet by mouth Daily.    Bilateral low back pain with sciatica, sciatica laterality unspecified, unspecified chronicity  -     Discontinue: HYDROcodone-acetaminophen (NORCO)  MG per tablet; Take 1 tablet by mouth Every 6 (Six) Hours As Needed for Moderate Pain . Fill after 12/14/18  -     HYDROcodone-acetaminophen (NORCO)  MG per tablet; Take 1 tablet by mouth Every 6 (Six) Hours As Needed for Moderate Pain . Fill after 1/13/19    Other orders  -     Cyanocobalamin (VITAMIN B-12) 5000 MCG sublingual tablet; Take 5,000 mcg by mouth Daily.  -     Hepatitis A Vaccine Adult IM             The patient has read and signed the [facility] Controlled Substance Contract.  I will continue to see patient for regular follow up appointments.  They are well controlled on their medication.  MARJORIE has been reviewed by me and is updated every 3 months. The patient is aware of the potential for addiction and  dependence.    Return in about 2 months (around 2/11/2019) for Next scheduled follow up, and annual physical on 4/17/18.

## 2018-12-20 DIAGNOSIS — F17.200 CURRENT SMOKER: ICD-10-CM

## 2018-12-20 RX ORDER — VARENICLINE TARTRATE 1 MG/1
1 TABLET, FILM COATED ORAL 2 TIMES DAILY
Qty: 60 TABLET | Refills: 1 | Status: SHIPPED | OUTPATIENT
Start: 2018-12-20 | End: 2019-02-12

## 2019-01-14 ENCOUNTER — TELEPHONE (OUTPATIENT)
Dept: INTERNAL MEDICINE | Facility: CLINIC | Age: 39
End: 2019-01-14

## 2019-01-14 NOTE — TELEPHONE ENCOUNTER
PATIENT STATES THAT HER INSURANCE COMPANY WILL NOT COVER HER NORCO MEDICATION AND WAS ADVISED BY MYSELF TO HAVE HER PHARMACY SEND OVER THE DENIAL LETTER SO THAT OUR OFFICE COULD START UP THE PA. IF THERE ARE ANY QUESTIONS OR CONCERNS THE PATIENT CAN BE REACHED [phone]

## 2019-01-15 NOTE — TELEPHONE ENCOUNTER
PT CALLED TO CHECK THE STATUS OF HER PA. I INFORMED PATIENT THAT A PA COULD TAKE UP TO 30 DAYS TO BE COMPLETED AND WE WOULD BE IN TOUCH WHEN THAT WAS TAKEN CARE OF. PLEASE CONTACT PT WHEN COMPLETED. THANKS.

## 2019-01-22 NOTE — TELEPHONE ENCOUNTER
PT IS CALLING TO CHECK ON PA, SHE WENT AHEAD AND PAID FOR HER MEDICATION THIS MONTH. SHE IS ASKING FOR A CALL. [phone]

## 2019-01-22 NOTE — TELEPHONE ENCOUNTER
Spoke to pt, advised that this is a quantity limit issue and that I do not have the correct phone numbers to calll.  Pt states that she got her cards in the mail today and will be in town tomorrow, will stop by and have them scanned into her chart.

## 2019-01-24 ENCOUNTER — TELEPHONE (OUTPATIENT)
Dept: INTERNAL MEDICINE | Facility: CLINIC | Age: 39
End: 2019-01-24

## 2019-02-08 ENCOUNTER — PRIOR AUTHORIZATION (OUTPATIENT)
Dept: INTERNAL MEDICINE | Facility: CLINIC | Age: 39
End: 2019-02-08

## 2019-02-12 ENCOUNTER — OFFICE VISIT (OUTPATIENT)
Dept: INTERNAL MEDICINE | Facility: CLINIC | Age: 39
End: 2019-02-12

## 2019-02-12 VITALS
HEIGHT: 65 IN | SYSTOLIC BLOOD PRESSURE: 112 MMHG | WEIGHT: 159 LBS | BODY MASS INDEX: 26.49 KG/M2 | OXYGEN SATURATION: 98 % | DIASTOLIC BLOOD PRESSURE: 70 MMHG | HEART RATE: 60 BPM

## 2019-02-12 DIAGNOSIS — I73.00 RAYNAUD'S DISEASE WITHOUT GANGRENE: ICD-10-CM

## 2019-02-12 DIAGNOSIS — M54.40 BILATERAL LOW BACK PAIN WITH SCIATICA, SCIATICA LATERALITY UNSPECIFIED, UNSPECIFIED CHRONICITY: ICD-10-CM

## 2019-02-12 DIAGNOSIS — N39.0 RECURRENT UTI: Primary | ICD-10-CM

## 2019-02-12 PROCEDURE — 99214 OFFICE O/P EST MOD 30 MIN: CPT | Performed by: INTERNAL MEDICINE

## 2019-02-12 RX ORDER — HYDROCODONE BITARTRATE AND ACETAMINOPHEN 10; 325 MG/1; MG/1
1 TABLET ORAL EVERY 6 HOURS PRN
Qty: 120 TABLET | Refills: 0 | Status: SHIPPED | OUTPATIENT
Start: 2019-02-12 | End: 2019-02-12

## 2019-02-12 RX ORDER — BUSPIRONE HYDROCHLORIDE 5 MG/1
10 TABLET ORAL 3 TIMES DAILY
COMMUNITY
End: 2020-08-18 | Stop reason: SDUPTHER

## 2019-02-12 RX ORDER — NITROFURANTOIN 25; 75 MG/1; MG/1
CAPSULE ORAL
Qty: 10 CAPSULE | Refills: 0 | Status: SHIPPED | OUTPATIENT
Start: 2019-02-12 | End: 2019-04-17

## 2019-02-12 RX ORDER — HYDROCODONE BITARTRATE AND ACETAMINOPHEN 10; 325 MG/1; MG/1
TABLET ORAL
Qty: 100 TABLET | Refills: 0 | Status: SHIPPED | OUTPATIENT
Start: 2019-02-12 | End: 2019-04-17 | Stop reason: SDUPTHER

## 2019-02-12 NOTE — PROGRESS NOTES
Back Pain and Hypothyroidism    Subjective   Subha Patino is a 38 y.o. female is here today for follow-up.    History of Present Illness   Having some hand and toe discoloration and numbness.  Having UTIs after intercourse.  Taking Hc/apap regularly but is prepared to cut down as its not well covered with the change in insurance.    Current Outpatient Medications:   •  busPIRone (BUSPAR) 5 MG tablet, Take 10 mg by mouth 3 (Three) Times a Day., Disp: , Rfl:   •  Cyanocobalamin (VITAMIN B-12) 5000 MCG sublingual tablet, Take 5,000 mcg by mouth Daily., Disp: 90 tablet, Rfl: 1  •  cyclobenzaprine (FLEXERIL) 10 MG tablet, Take 1 tablet by mouth Daily., Disp: 30 tablet, Rfl: 4  •  fluticasone (FLONASE) 50 MCG/ACT nasal spray, 2 sprays by Each Nare route Daily., Disp: , Rfl: 11  •  HYDROcodone-acetaminophen (NORCO)  MG per tablet, Take 1 PO TID and as needed, fill after 3/12/19, Disp: 100 tablet, Rfl: 0  •  loratadine (CLARITIN) 10 MG tablet, Take 1 tablet by mouth Daily., Disp: 90 tablet, Rfl: 1  •  SYNTHROID 88 MCG tablet, Take 1 tablet by mouth Daily., Disp: 90 tablet, Rfl: 3  •  spironolactone (ALDACTONE) 50 MG tablet, TAKE 2 TABLETS BY MOUTH EVERY DAY, Disp: 60 tablet, Rfl: 0      The following portions of the patient's history were reviewed and updated as appropriate: allergies, current medications, past family history, past medical history, past social history, past surgical history and problem list.    Review of Systems   Constitutional: Negative.  Negative for chills and fever.   HENT: Negative for ear discharge, ear pain, sinus pressure and sore throat.    Eyes: Negative for pain and redness.   Respiratory: Negative for cough, chest tightness and shortness of breath.    Cardiovascular: Negative for chest pain, palpitations and leg swelling.   Gastrointestinal: Negative for diarrhea, nausea and vomiting.   Genitourinary: Positive for dysuria and urgency.   Musculoskeletal: Negative for arthralgias, back  "pain and myalgias.   Neurological: Negative for dizziness, syncope and headaches.   Psychiatric/Behavioral: Negative for confusion and sleep disturbance.       Objective   /70   Pulse 60   Ht 165.1 cm (65\")   Wt 72.1 kg (159 lb)   SpO2 98%   BMI 26.46 kg/m²   Physical Exam   Constitutional: She is oriented to person, place, and time. She appears well-developed and well-nourished.   HENT:   Head: Normocephalic and atraumatic.   Mouth/Throat: No oropharyngeal exudate.   Eyes: Conjunctivae are normal. Pupils are equal, round, and reactive to light.   Neck: Neck supple. No thyromegaly present.   Cardiovascular: Normal rate and regular rhythm.   Pulmonary/Chest: Effort normal and breath sounds normal.   Abdominal: Soft. Bowel sounds are normal. She exhibits no distension. There is tenderness.   Musculoskeletal: She exhibits no edema.   Neurological: She is alert and oriented to person, place, and time. No cranial nerve deficit.   Skin: Skin is warm and dry.   Psychiatric: She has a normal mood and affect. Judgment normal.   Nursing note and vitals reviewed.        Results for orders placed or performed in visit on 09/13/18   CBC (No Diff)   Result Value Ref Range    WBC 11.33 (H) 3.50 - 10.80 10*3/mm3    RBC 4.77 3.89 - 5.14 10*6/mm3    Hemoglobin 15.7 (H) 11.5 - 15.5 g/dL    Hematocrit 45.4 (H) 34.5 - 44.0 %    MCV 95.2 80.0 - 99.0 fL    MCH 32.9 (H) 27.0 - 31.0 pg    MCHC 34.6 32.0 - 36.0 g/dL    RDW 11.8 11.3 - 14.5 %    RDW-SD 41.3 37.0 - 54.0 fl    MPV 10.1 6.0 - 12.0 fL    Platelets 245 150 - 450 10*3/mm3   Urinalysis With Culture If Indicated - Urine, Clean Catch   Result Value Ref Range    Color, UA Yellow Yellow, Straw    Appearance, UA Cloudy (A) Clear    pH, UA 5.5 5.0 - 8.0    Specific Gravity, UA 1.032 (H) 1.001 - 1.030    Glucose, UA Negative Negative    Ketones, UA Trace (A) Negative    Bilirubin, UA Negative Negative    Blood, UA Negative Negative    Protein, UA Trace (A) Negative    Leuk " Esterase, UA Negative Negative    Nitrite, UA Negative Negative    Urobilinogen, UA 1.0 E.U./dL 0.2 - 1.0 E.U./dL   Pregnancy, Urine - Urine, Clean Catch   Result Value Ref Range    HCG, Urine QL Negative Negative   TSH   Result Value Ref Range    TSH 2.488 0.350 - 5.350 mIU/mL   T4   Result Value Ref Range    T4, Total 11.70 (H) 4.70 - 11.40 mcg/dL   Urinalysis, Microscopic Only - Urine, Clean Catch   Result Value Ref Range    RBC, UA 3-6 (A) None Seen, 0-2 /HPF    WBC, UA 3-5 (A) None Seen, 0-2 /HPF    Bacteria, UA None Seen None Seen, Trace /HPF    Squamous Epithelial Cells, UA 13-20 (A) None Seen, 0-2 /HPF    Hyaline Casts, UA 0-6 0 - 6 /LPF    Methodology Manual Light Microscopy              Assessment/Plan   Diagnoses and all orders for this visit:    Recurrent UTI  -     Discontinue: nitrofurantoin, macrocrystal-monohydrate, (MACROBID) 100 MG capsule; 1 PO as needed after intercourse    Bilateral low back pain with sciatica, sciatica laterality unspecified, unspecified chronicity  -     Discontinue: HYDROcodone-acetaminophen (NORCO)  MG per tablet; Take 1 tablet by mouth Every 6 (Six) Hours As Needed for Moderate Pain . Fill after 1/13/19  -     Discontinue: HYDROcodone-acetaminophen (NORCO)  MG per tablet; Take 1 tablet by mouth Every 6 (Six) Hours As Needed for Moderate Pain .  -     HYDROcodone-acetaminophen (NORCO)  MG per tablet; Take 1 PO TID and as needed, fill after 3/12/19    Raynaud's disease without gangrene    Other orders  -     busPIRone (BUSPAR) 5 MG tablet; Take 10 mg by mouth 3 (Three) Times a Day.      Decrease hc/apap to tid and prn , #100    The patient has read and signed the [facility] Controlled Substance Contract.  I will continue to see patient for regular follow up appointments.  They are well controlled on their medication.  MARJORIE has been reviewed by me and is updated every 3 months. The patient is aware of the potential for addiction and dependence.              Return in about 2 months (around 4/12/2019).

## 2019-04-16 DIAGNOSIS — L70.9 ACNE, UNSPECIFIED ACNE TYPE: ICD-10-CM

## 2019-04-16 DIAGNOSIS — L70.0 ACNE CYSTICA: ICD-10-CM

## 2019-04-16 RX ORDER — SPIRONOLACTONE 50 MG/1
TABLET, FILM COATED ORAL
Qty: 60 TABLET | Refills: 0 | Status: SHIPPED | OUTPATIENT
Start: 2019-04-16 | End: 2019-05-17 | Stop reason: SDUPTHER

## 2019-04-17 ENCOUNTER — OFFICE VISIT (OUTPATIENT)
Dept: INTERNAL MEDICINE | Facility: CLINIC | Age: 39
End: 2019-04-17

## 2019-04-17 VITALS
DIASTOLIC BLOOD PRESSURE: 68 MMHG | WEIGHT: 159 LBS | SYSTOLIC BLOOD PRESSURE: 118 MMHG | RESPIRATION RATE: 20 BRPM | TEMPERATURE: 98.4 F | OXYGEN SATURATION: 98 % | HEART RATE: 94 BPM | BODY MASS INDEX: 26.46 KG/M2

## 2019-04-17 DIAGNOSIS — M54.40 BILATERAL LOW BACK PAIN WITH SCIATICA, SCIATICA LATERALITY UNSPECIFIED, UNSPECIFIED CHRONICITY: ICD-10-CM

## 2019-04-17 PROCEDURE — 99212 OFFICE O/P EST SF 10 MIN: CPT | Performed by: INTERNAL MEDICINE

## 2019-04-17 RX ORDER — HYDROCODONE BITARTRATE AND ACETAMINOPHEN 10; 325 MG/1; MG/1
TABLET ORAL
Qty: 100 TABLET | Refills: 0 | Status: SHIPPED | OUTPATIENT
Start: 2019-04-17 | End: 2019-04-17 | Stop reason: SDUPTHER

## 2019-04-17 RX ORDER — HYDROCODONE BITARTRATE AND ACETAMINOPHEN 10; 325 MG/1; MG/1
TABLET ORAL
Qty: 100 TABLET | Refills: 0 | Status: SHIPPED | OUTPATIENT
Start: 2019-04-17 | End: 2019-06-18 | Stop reason: SDUPTHER

## 2019-04-17 NOTE — PROGRESS NOTES
Follow-up (low back pain)    Subjective   Subha Patino is a 38 y.o. female is here today for follow-up.    History of Present Illness   Subha reports that her insurance is no longer in network with us.  Has been sweating a lot. Has been off the depo x 1 year.  Here for f/u on her chronic low back and leg pain.. Needs refills on meds.      Current Outpatient Medications:   •  busPIRone (BUSPAR) 5 MG tablet, Take 10 mg by mouth 3 (Three) Times a Day., Disp: , Rfl:   •  Cyanocobalamin (VITAMIN B-12) 5000 MCG sublingual tablet, Take 5,000 mcg by mouth Daily., Disp: 90 tablet, Rfl: 1  •  cyclobenzaprine (FLEXERIL) 10 MG tablet, Take 1 tablet by mouth Daily., Disp: 30 tablet, Rfl: 4  •  fluticasone (FLONASE) 50 MCG/ACT nasal spray, 2 sprays by Each Nare route Daily., Disp: , Rfl: 11  •  HYDROcodone-acetaminophen (NORCO)  MG per tablet, Take 1 PO TID and as needed. fill after 5/18/19, Disp: 100 tablet, Rfl: 0  •  loratadine (CLARITIN) 10 MG tablet, Take 1 tablet by mouth Daily., Disp: 90 tablet, Rfl: 1  •  spironolactone (ALDACTONE) 50 MG tablet, TAKE 2 TABLETS BY MOUTH EVERY DAY, Disp: 60 tablet, Rfl: 0  •  SYNTHROID 88 MCG tablet, Take 1 tablet by mouth Daily., Disp: 90 tablet, Rfl: 3      The following portions of the patient's history were reviewed and updated as appropriate: allergies, current medications, past family history, past medical history, past social history, past surgical history and problem list.    Review of Systems   Constitutional: Negative.  Negative for chills and fever.   HENT: Negative for ear discharge, ear pain, sinus pressure and sore throat.    Respiratory: Negative for cough, chest tightness and shortness of breath.    Cardiovascular: Negative for chest pain, palpitations and leg swelling.   Gastrointestinal: Negative for diarrhea, nausea and vomiting.   Musculoskeletal: Positive for arthralgias and back pain. Negative for myalgias.   Neurological: Negative for dizziness, syncope and  headaches.   Psychiatric/Behavioral: Positive for dysphoric mood. Negative for confusion and sleep disturbance.       Objective   /68 (BP Location: Left arm, Patient Position: Sitting, Cuff Size: Adult)   Pulse 94   Temp 98.4 °F (36.9 °C) (Oral)   Resp 20   Wt 72.1 kg (159 lb)   SpO2 98%   BMI 26.46 kg/m²   Physical Exam   Constitutional: She is oriented to person, place, and time. She appears well-developed and well-nourished.   HENT:   Head: Normocephalic and atraumatic.   Mouth/Throat: No oropharyngeal exudate.   Neck: Neck supple. No thyromegaly present.   Cardiovascular: Normal rate and regular rhythm.   Pulmonary/Chest: Effort normal and breath sounds normal.   Musculoskeletal: She exhibits tenderness. She exhibits no edema.   Neurological: She is alert and oriented to person, place, and time. No cranial nerve deficit.   Skin: Skin is warm and dry.   Psychiatric: She has a normal mood and affect. Judgment normal.   Nursing note and vitals reviewed.        Results for orders placed or performed in visit on 09/13/18   CBC (No Diff)   Result Value Ref Range    WBC 11.33 (H) 3.50 - 10.80 10*3/mm3    RBC 4.77 3.89 - 5.14 10*6/mm3    Hemoglobin 15.7 (H) 11.5 - 15.5 g/dL    Hematocrit 45.4 (H) 34.5 - 44.0 %    MCV 95.2 80.0 - 99.0 fL    MCH 32.9 (H) 27.0 - 31.0 pg    MCHC 34.6 32.0 - 36.0 g/dL    RDW 11.8 11.3 - 14.5 %    RDW-SD 41.3 37.0 - 54.0 fl    MPV 10.1 6.0 - 12.0 fL    Platelets 245 150 - 450 10*3/mm3   Urinalysis With Culture If Indicated - Urine, Clean Catch   Result Value Ref Range    Color, UA Yellow Yellow, Straw    Appearance, UA Cloudy (A) Clear    pH, UA 5.5 5.0 - 8.0    Specific Gravity, UA 1.032 (H) 1.001 - 1.030    Glucose, UA Negative Negative    Ketones, UA Trace (A) Negative    Bilirubin, UA Negative Negative    Blood, UA Negative Negative    Protein, UA Trace (A) Negative    Leuk Esterase, UA Negative Negative    Nitrite, UA Negative Negative    Urobilinogen, UA 1.0 E.U./dL 0.2 -  1.0 E.U./dL   Pregnancy, Urine - Urine, Clean Catch   Result Value Ref Range    HCG, Urine QL Negative Negative   TSH   Result Value Ref Range    TSH 2.488 0.350 - 5.350 mIU/mL   T4   Result Value Ref Range    T4, Total 11.70 (H) 4.70 - 11.40 mcg/dL   Urinalysis, Microscopic Only - Urine, Clean Catch   Result Value Ref Range    RBC, UA 3-6 (A) None Seen, 0-2 /HPF    WBC, UA 3-5 (A) None Seen, 0-2 /HPF    Bacteria, UA None Seen None Seen, Trace /HPF    Squamous Epithelial Cells, UA 13-20 (A) None Seen, 0-2 /HPF    Hyaline Casts, UA 0-6 0 - 6 /LPF    Methodology Manual Light Microscopy              Assessment/Plan   Diagnoses and all orders for this visit:    Bilateral low back pain with sciatica, sciatica laterality unspecified, unspecified chronicity  -     Discontinue: HYDROcodone-acetaminophen (NORCO)  MG per tablet; Take 1 PO TID and as needed.  -     HYDROcodone-acetaminophen (NORCO)  MG per tablet; Take 1 PO TID and as needed. fill after 5/18/19    reassured can do cash pay for thi year. WIll defer testing until insurance accepted.     The patient has read and signed the [facility] Controlled Substance Contract.  I will continue to see patient for regular follow up appointments.  They are well controlled on their medication.  MARJORIE has been reviewed by me and is updated every 3 months. The patient is aware of the potential for addiction and dependence.        Return in about 2 months (around 6/17/2019) for Next scheduled follow up.

## 2019-04-22 DIAGNOSIS — E03.9 ACQUIRED HYPOTHYROIDISM: ICD-10-CM

## 2019-04-22 RX ORDER — LEVOTHYROXINE SODIUM 88 MCG
TABLET ORAL
Qty: 90 TABLET | Refills: 1 | Status: SHIPPED | OUTPATIENT
Start: 2019-04-22 | End: 2019-10-11 | Stop reason: SDUPTHER

## 2019-05-17 DIAGNOSIS — L70.9 ACNE, UNSPECIFIED ACNE TYPE: ICD-10-CM

## 2019-05-17 DIAGNOSIS — L70.0 ACNE CYSTICA: ICD-10-CM

## 2019-05-17 RX ORDER — SPIRONOLACTONE 50 MG/1
TABLET, FILM COATED ORAL
Qty: 60 TABLET | Refills: 2 | Status: SHIPPED | OUTPATIENT
Start: 2019-05-17 | End: 2019-06-18 | Stop reason: SDUPTHER

## 2019-06-18 ENCOUNTER — OFFICE VISIT (OUTPATIENT)
Dept: INTERNAL MEDICINE | Facility: CLINIC | Age: 39
End: 2019-06-18

## 2019-06-18 VITALS
WEIGHT: 152.2 LBS | OXYGEN SATURATION: 98 % | HEIGHT: 65 IN | BODY MASS INDEX: 25.36 KG/M2 | SYSTOLIC BLOOD PRESSURE: 110 MMHG | HEART RATE: 75 BPM

## 2019-06-18 DIAGNOSIS — L70.0 ACNE CYSTICA: ICD-10-CM

## 2019-06-18 DIAGNOSIS — M54.40 BILATERAL LOW BACK PAIN WITH SCIATICA, SCIATICA LATERALITY UNSPECIFIED, UNSPECIFIED CHRONICITY: ICD-10-CM

## 2019-06-18 DIAGNOSIS — L70.9 ACNE, UNSPECIFIED ACNE TYPE: ICD-10-CM

## 2019-06-18 DIAGNOSIS — N63.0 BREAST LUMP: Primary | ICD-10-CM

## 2019-06-18 PROCEDURE — 99213 OFFICE O/P EST LOW 20 MIN: CPT | Performed by: INTERNAL MEDICINE

## 2019-06-18 RX ORDER — NITROFURANTOIN 25; 75 MG/1; MG/1
CAPSULE ORAL
Qty: 30 CAPSULE | Refills: 1
Start: 2019-06-18 | End: 2019-08-20 | Stop reason: SDUPTHER

## 2019-06-18 RX ORDER — SPIRONOLACTONE 50 MG/1
100 TABLET, FILM COATED ORAL DAILY
Qty: 60 TABLET | Refills: 2 | Status: SHIPPED | OUTPATIENT
Start: 2019-06-18 | End: 2019-10-15 | Stop reason: SDUPTHER

## 2019-06-18 RX ORDER — HYDROCODONE BITARTRATE AND ACETAMINOPHEN 10; 325 MG/1; MG/1
TABLET ORAL
Qty: 100 TABLET | Refills: 0 | Status: SHIPPED | OUTPATIENT
Start: 2019-06-18 | End: 2019-06-18 | Stop reason: SDUPTHER

## 2019-06-18 RX ORDER — HYDROCODONE BITARTRATE AND ACETAMINOPHEN 10; 325 MG/1; MG/1
TABLET ORAL
Qty: 100 TABLET | Refills: 0 | Status: SHIPPED | OUTPATIENT
Start: 2019-06-18 | End: 2019-08-20 | Stop reason: SDUPTHER

## 2019-06-18 RX ORDER — CYCLOBENZAPRINE HCL 10 MG
10 TABLET ORAL DAILY
Qty: 30 TABLET | Refills: 4 | Status: SHIPPED | OUTPATIENT
Start: 2019-06-18 | End: 2019-10-15 | Stop reason: SDUPTHER

## 2019-06-18 NOTE — PROGRESS NOTES
Back Pain; Breast Mass (right); and Med Refill    Subjective   Subha Patino is a 39 y.o. female is here today for follow-up.    History of Present Illness   Has been off the depo, as hse has her tubes tied.  Rt breast lump, last mamm was in 2016  Back is stable, doing better, needs refills.      Current Outpatient Medications:   •  busPIRone (BUSPAR) 5 MG tablet, Take 10 mg by mouth 3 (Three) Times a Day., Disp: , Rfl:   •  Cyanocobalamin (VITAMIN B-12) 5000 MCG sublingual tablet, Take 5,000 mcg by mouth Daily., Disp: 90 tablet, Rfl: 1  •  cyclobenzaprine (FLEXERIL) 10 MG tablet, Take 1 tablet by mouth Daily., Disp: 30 tablet, Rfl: 4  •  fluticasone (FLONASE) 50 MCG/ACT nasal spray, 2 sprays by Each Nare route Daily., Disp: , Rfl: 11  •  HYDROcodone-acetaminophen (NORCO)  MG per tablet, Take 1 PO TID and as needed. Fill on are after 7/18/19, Disp: 100 tablet, Rfl: 0  •  loratadine (CLARITIN) 10 MG tablet, Take 1 tablet by mouth Daily., Disp: 90 tablet, Rfl: 1  •  spironolactone (ALDACTONE) 50 MG tablet, Take 2 tablets by mouth Daily., Disp: 60 tablet, Rfl: 2  •  SYNTHROID 88 MCG tablet, TAKE 1 TABLET BY MOUTH EVERY DAY, Disp: 90 tablet, Rfl: 1  •  nitrofurantoin, macrocrystal-monohydrate, (MACROBID) 100 MG capsule, Use prn after intercourse, Disp: 30 capsule, Rfl: 1      The following portions of the patient's history were reviewed and updated as appropriate: allergies, current medications, past family history, past medical history, past social history, past surgical history and problem list.    Review of Systems   Constitutional: Negative.  Negative for chills and fever.   HENT: Negative for ear discharge, ear pain, sinus pressure and sore throat.    Respiratory: Negative for cough, chest tightness and shortness of breath.         Breast lump   Cardiovascular: Negative for chest pain, palpitations and leg swelling.   Gastrointestinal: Negative for diarrhea, nausea and vomiting.   Musculoskeletal: Positive  "for arthralgias and back pain. Negative for myalgias.   Neurological: Negative for dizziness, syncope and headaches.   Psychiatric/Behavioral: Negative for confusion and sleep disturbance.       Objective   BP (!) 110/0   Pulse 75   Ht 165.1 cm (65\")   Wt 69 kg (152 lb 3.2 oz)   SpO2 98% Comment: alyson  BMI 25.33 kg/m²   Physical Exam   Constitutional: She is oriented to person, place, and time. She appears well-developed and well-nourished.   HENT:   Head: Normocephalic and atraumatic.   Mouth/Throat: No oropharyngeal exudate.   Eyes: Conjunctivae are normal. Pupils are equal, round, and reactive to light.   Neck: Neck supple. No thyromegaly present.   Cardiovascular: Normal rate and regular rhythm.   Pulmonary/Chest: Effort normal and breath sounds normal.   Abdominal: Soft. Bowel sounds are normal. She exhibits no distension. There is no tenderness.   Musculoskeletal: She exhibits tenderness. She exhibits no edema.   Neurological: She is alert and oriented to person, place, and time. No cranial nerve deficit.   Skin: Skin is warm and dry.   Psychiatric: She has a normal mood and affect. Judgment normal.   Nursing note and vitals reviewed.        Results for orders placed or performed in visit on 09/13/18   CBC (No Diff)   Result Value Ref Range    WBC 11.33 (H) 3.50 - 10.80 10*3/mm3    RBC 4.77 3.89 - 5.14 10*6/mm3    Hemoglobin 15.7 (H) 11.5 - 15.5 g/dL    Hematocrit 45.4 (H) 34.5 - 44.0 %    MCV 95.2 80.0 - 99.0 fL    MCH 32.9 (H) 27.0 - 31.0 pg    MCHC 34.6 32.0 - 36.0 g/dL    RDW 11.8 11.3 - 14.5 %    RDW-SD 41.3 37.0 - 54.0 fl    MPV 10.1 6.0 - 12.0 fL    Platelets 245 150 - 450 10*3/mm3   Urinalysis With Culture If Indicated - Urine, Clean Catch   Result Value Ref Range    Color, UA Yellow Yellow, Straw    Appearance, UA Cloudy (A) Clear    pH, UA 5.5 5.0 - 8.0    Specific Gravity, UA 1.032 (H) 1.001 - 1.030    Glucose, UA Negative Negative    Ketones, UA Trace (A) Negative    Bilirubin, UA Negative " Negative    Blood, UA Negative Negative    Protein, UA Trace (A) Negative    Leuk Esterase, UA Negative Negative    Nitrite, UA Negative Negative    Urobilinogen, UA 1.0 E.U./dL 0.2 - 1.0 E.U./dL   Pregnancy, Urine - Urine, Clean Catch   Result Value Ref Range    HCG, Urine QL Negative Negative   TSH   Result Value Ref Range    TSH 2.488 0.350 - 5.350 mIU/mL   T4   Result Value Ref Range    T4, Total 11.70 (H) 4.70 - 11.40 mcg/dL   Urinalysis, Microscopic Only - Urine, Clean Catch   Result Value Ref Range    RBC, UA 3-6 (A) None Seen, 0-2 /HPF    WBC, UA 3-5 (A) None Seen, 0-2 /HPF    Bacteria, UA None Seen None Seen, Trace /HPF    Squamous Epithelial Cells, UA 13-20 (A) None Seen, 0-2 /HPF    Hyaline Casts, UA 0-6 0 - 6 /LPF    Methodology Manual Light Microscopy              Assessment/Plan   Diagnoses and all orders for this visit:    Breast lump  Comments:  adv. likely breast tissue, no mass.  proceed with screening mammogram.    Bilateral low back pain with sciatica, sciatica laterality unspecified, unspecified chronicity  -     Discontinue: HYDROcodone-acetaminophen (NORCO)  MG per tablet; Take 1 PO TID and as needed.  -     HYDROcodone-acetaminophen (NORCO)  MG per tablet; Take 1 PO TID and as needed. Fill on are after 7/18/19    Acne, unspecified acne type  -     spironolactone (ALDACTONE) 50 MG tablet; Take 2 tablets by mouth Daily.    Acne cystica  -     spironolactone (ALDACTONE) 50 MG tablet; Take 2 tablets by mouth Daily.    Other orders  -     cyclobenzaprine (FLEXERIL) 10 MG tablet; Take 1 tablet by mouth Daily.  -     nitrofurantoin, macrocrystal-monohydrate, (MACROBID) 100 MG capsule; Use prn after intercourse    The patient has read and signed the [facility] Controlled Substance Contract.  I will continue to see patient for regular follow up appointments.  They are well controlled on their medication.  MARJORIE has been reviewed by me and is updated every 3 months. The patient is  aware of the potential for addiction and dependence.               Return in about 2 months (around 8/18/2019) for Next scheduled follow up.

## 2019-06-21 DIAGNOSIS — N39.0 RECURRENT UTI: ICD-10-CM

## 2019-06-21 RX ORDER — NITROFURANTOIN 25; 75 MG/1; MG/1
CAPSULE ORAL
Qty: 10 CAPSULE | Refills: 0 | OUTPATIENT
Start: 2019-06-21

## 2019-08-20 ENCOUNTER — OFFICE VISIT (OUTPATIENT)
Dept: INTERNAL MEDICINE | Facility: CLINIC | Age: 39
End: 2019-08-20

## 2019-08-20 VITALS
BODY MASS INDEX: 24.89 KG/M2 | HEIGHT: 65 IN | SYSTOLIC BLOOD PRESSURE: 102 MMHG | WEIGHT: 149.4 LBS | HEART RATE: 62 BPM | OXYGEN SATURATION: 99 % | DIASTOLIC BLOOD PRESSURE: 70 MMHG

## 2019-08-20 DIAGNOSIS — Z87.891 HISTORY OF TOBACCO USE: Primary | ICD-10-CM

## 2019-08-20 DIAGNOSIS — N39.0 RECURRENT UTI: ICD-10-CM

## 2019-08-20 DIAGNOSIS — M54.40 BILATERAL LOW BACK PAIN WITH SCIATICA, SCIATICA LATERALITY UNSPECIFIED, UNSPECIFIED CHRONICITY: ICD-10-CM

## 2019-08-20 PROCEDURE — 99213 OFFICE O/P EST LOW 20 MIN: CPT | Performed by: INTERNAL MEDICINE

## 2019-08-20 RX ORDER — BUPROPION HYDROCHLORIDE 300 MG/1
300 TABLET ORAL DAILY
Qty: 30 TABLET | Refills: 5 | Status: SHIPPED | OUTPATIENT
Start: 2019-08-20 | End: 2020-01-28

## 2019-08-20 RX ORDER — NITROFURANTOIN 25; 75 MG/1; MG/1
CAPSULE ORAL
Qty: 20 CAPSULE | Refills: 1 | Status: SHIPPED | OUTPATIENT
Start: 2019-08-20 | End: 2019-10-15 | Stop reason: SDUPTHER

## 2019-08-20 RX ORDER — HYDROCODONE BITARTRATE AND ACETAMINOPHEN 10; 325 MG/1; MG/1
TABLET ORAL
Qty: 100 TABLET | Refills: 0 | Status: SHIPPED | OUTPATIENT
Start: 2019-08-20 | End: 2019-08-20 | Stop reason: SDUPTHER

## 2019-08-20 RX ORDER — HYDROCODONE BITARTRATE AND ACETAMINOPHEN 10; 325 MG/1; MG/1
TABLET ORAL
Qty: 100 TABLET | Refills: 0 | Status: SHIPPED | OUTPATIENT
Start: 2019-08-20 | End: 2019-10-15 | Stop reason: SDUPTHER

## 2019-08-20 NOTE — PROGRESS NOTES
Med Refill (Norco, Macrobid)    Subjective   Subha Patino is a 39 y.o. female is here today for follow-up.    History of Present Illness   Has quit smoking again, a week into it.started back on wellbutrin, unable to take chantix.  Needs refills on her macrobid, as working when takes it after IC.  Here for f/u on her chronic lbp and arthalgias, stable on current regimen.      Current Outpatient Medications:   •  busPIRone (BUSPAR) 5 MG tablet, Take 10 mg by mouth 3 (Three) Times a Day., Disp: , Rfl:   •  Cyanocobalamin (VITAMIN B-12) 5000 MCG sublingual tablet, Take 5,000 mcg by mouth Daily., Disp: 90 tablet, Rfl: 1  •  cyclobenzaprine (FLEXERIL) 10 MG tablet, Take 1 tablet by mouth Daily., Disp: 30 tablet, Rfl: 4  •  fluticasone (FLONASE) 50 MCG/ACT nasal spray, 2 sprays by Each Nare route Daily., Disp: , Rfl: 11  •  HYDROcodone-acetaminophen (NORCO)  MG per tablet, Take 1 PO TID and as needed. Fill on or after 9/19/19, Disp: 100 tablet, Rfl: 0  •  loratadine (CLARITIN) 10 MG tablet, Take 1 tablet by mouth Daily., Disp: 90 tablet, Rfl: 1  •  spironolactone (ALDACTONE) 50 MG tablet, Take 2 tablets by mouth Daily., Disp: 60 tablet, Rfl: 2  •  SYNTHROID 88 MCG tablet, TAKE 1 TABLET BY MOUTH EVERY DAY, Disp: 90 tablet, Rfl: 1  •  buPROPion XL (WELLBUTRIN XL) 300 MG 24 hr tablet, Take 1 tablet by mouth Daily., Disp: 30 tablet, Rfl: 5  •  nitrofurantoin, macrocrystal-monohydrate, (MACROBID) 100 MG capsule, Use prn after intercourse, Disp: 20 capsule, Rfl: 1      The following portions of the patient's history were reviewed and updated as appropriate: allergies, current medications, past family history, past medical history, past social history, past surgical history and problem list.    Review of Systems   Constitutional: Negative.  Negative for chills and fever.   HENT: Negative for ear discharge, ear pain, sinus pressure and sore throat.    Respiratory: Negative for cough, chest tightness and shortness of  "breath.    Cardiovascular: Negative for chest pain, palpitations and leg swelling.   Gastrointestinal: Negative for diarrhea, nausea and vomiting.   Musculoskeletal: Positive for arthralgias, back pain and myalgias.   Neurological: Negative for dizziness, syncope and headaches.   Psychiatric/Behavioral: Positive for sleep disturbance. Negative for confusion.       Objective   /70   Pulse 62   Ht 165.1 cm (65\")   Wt 67.8 kg (149 lb 6.4 oz)   SpO2 99%   BMI 24.86 kg/m²   Physical Exam   Constitutional: She is oriented to person, place, and time. She appears well-developed and well-nourished.   HENT:   Head: Normocephalic and atraumatic.   Right Ear: External ear normal.   Left Ear: External ear normal.   Mouth/Throat: No oropharyngeal exudate.   Eyes: Conjunctivae are normal. Pupils are equal, round, and reactive to light.   Neck: Neck supple. No thyromegaly present.   Cardiovascular: Normal rate and regular rhythm.   Pulmonary/Chest: Effort normal and breath sounds normal.   Abdominal: Soft. Bowel sounds are normal. She exhibits no distension. There is no tenderness.   Musculoskeletal: She exhibits tenderness. She exhibits no edema.   Neurological: She is alert and oriented to person, place, and time. No cranial nerve deficit.   Skin: Skin is warm and dry.   Psychiatric: She has a normal mood and affect. Judgment normal.   Nursing note and vitals reviewed.        Results for orders placed or performed in visit on 09/13/18   CBC (No Diff)   Result Value Ref Range    WBC 11.33 (H) 3.50 - 10.80 10*3/mm3    RBC 4.77 3.89 - 5.14 10*6/mm3    Hemoglobin 15.7 (H) 11.5 - 15.5 g/dL    Hematocrit 45.4 (H) 34.5 - 44.0 %    MCV 95.2 80.0 - 99.0 fL    MCH 32.9 (H) 27.0 - 31.0 pg    MCHC 34.6 32.0 - 36.0 g/dL    RDW 11.8 11.3 - 14.5 %    RDW-SD 41.3 37.0 - 54.0 fl    MPV 10.1 6.0 - 12.0 fL    Platelets 245 150 - 450 10*3/mm3   Urinalysis With Culture If Indicated - Urine, Clean Catch   Result Value Ref Range    Color, UA " Yellow Yellow, Straw    Appearance, UA Cloudy (A) Clear    pH, UA 5.5 5.0 - 8.0    Specific Gravity, UA 1.032 (H) 1.001 - 1.030    Glucose, UA Negative Negative    Ketones, UA Trace (A) Negative    Bilirubin, UA Negative Negative    Blood, UA Negative Negative    Protein, UA Trace (A) Negative    Leuk Esterase, UA Negative Negative    Nitrite, UA Negative Negative    Urobilinogen, UA 1.0 E.U./dL 0.2 - 1.0 E.U./dL   Pregnancy, Urine - Urine, Clean Catch   Result Value Ref Range    HCG, Urine QL Negative Negative   TSH   Result Value Ref Range    TSH 2.488 0.350 - 5.350 mIU/mL   T4   Result Value Ref Range    T4, Total 11.70 (H) 4.70 - 11.40 mcg/dL   Urinalysis, Microscopic Only - Urine, Clean Catch   Result Value Ref Range    RBC, UA 3-6 (A) None Seen, 0-2 /HPF    WBC, UA 3-5 (A) None Seen, 0-2 /HPF    Bacteria, UA None Seen None Seen, Trace /HPF    Squamous Epithelial Cells, UA 13-20 (A) None Seen, 0-2 /HPF    Hyaline Casts, UA 0-6 0 - 6 /LPF    Methodology Manual Light Microscopy              Assessment/Plan   Diagnoses and all orders for this visit:    History of tobacco use  -     buPROPion XL (WELLBUTRIN XL) 300 MG 24 hr tablet; Take 1 tablet by mouth Daily.    Bilateral low back pain with sciatica, sciatica laterality unspecified, unspecified chronicity  -     Discontinue: HYDROcodone-acetaminophen (NORCO)  MG per tablet; Take 1 PO TID and as needed. Fill on are after 7/18/19  -     HYDROcodone-acetaminophen (NORCO)  MG per tablet; Take 1 PO TID and as needed. Fill on or after 9/19/19    Recurrent UTI  -     nitrofurantoin, macrocrystal-monohydrate, (MACROBID) 100 MG capsule; Use prn after intercourse             The patient has read and signed the [facility] Controlled Substance Contract.  I will continue to see patient for regular follow up appointments.  They are well controlled on their medication.  MARJORIE has been reviewed by me and is updated every 3 months. The patient is aware of the  potential for addiction and dependence.      Return in about 2 months (around 10/20/2019).

## 2019-10-11 DIAGNOSIS — E03.9 ACQUIRED HYPOTHYROIDISM: ICD-10-CM

## 2019-10-11 RX ORDER — LEVOTHYROXINE SODIUM 88 UG/1
88 TABLET ORAL DAILY
Qty: 30 TABLET | Refills: 0 | Status: SHIPPED | OUTPATIENT
Start: 2019-10-11 | End: 2019-10-15 | Stop reason: SDUPTHER

## 2019-10-15 ENCOUNTER — OFFICE VISIT (OUTPATIENT)
Dept: INTERNAL MEDICINE | Facility: CLINIC | Age: 39
End: 2019-10-15

## 2019-10-15 VITALS
HEIGHT: 65 IN | BODY MASS INDEX: 25.02 KG/M2 | HEART RATE: 89 BPM | SYSTOLIC BLOOD PRESSURE: 136 MMHG | DIASTOLIC BLOOD PRESSURE: 84 MMHG | OXYGEN SATURATION: 98 % | WEIGHT: 150.2 LBS

## 2019-10-15 DIAGNOSIS — N39.0 RECURRENT UTI: ICD-10-CM

## 2019-10-15 DIAGNOSIS — E03.9 ACQUIRED HYPOTHYROIDISM: ICD-10-CM

## 2019-10-15 DIAGNOSIS — L70.9 ACNE, UNSPECIFIED ACNE TYPE: ICD-10-CM

## 2019-10-15 DIAGNOSIS — M54.40 BILATERAL LOW BACK PAIN WITH SCIATICA, SCIATICA LATERALITY UNSPECIFIED, UNSPECIFIED CHRONICITY: ICD-10-CM

## 2019-10-15 DIAGNOSIS — L70.0 ACNE CYSTICA: ICD-10-CM

## 2019-10-15 PROCEDURE — 99212 OFFICE O/P EST SF 10 MIN: CPT | Performed by: INTERNAL MEDICINE

## 2019-10-15 RX ORDER — LEVOTHYROXINE SODIUM 88 UG/1
88 TABLET ORAL DAILY
Qty: 30 TABLET | Refills: 3 | Status: SHIPPED | OUTPATIENT
Start: 2019-10-15 | End: 2019-12-10 | Stop reason: SDUPTHER

## 2019-10-15 RX ORDER — HYDROCODONE BITARTRATE AND ACETAMINOPHEN 10; 325 MG/1; MG/1
TABLET ORAL
Qty: 100 TABLET | Refills: 0 | Status: SHIPPED | OUTPATIENT
Start: 2019-10-15 | End: 2019-10-15 | Stop reason: SDUPTHER

## 2019-10-15 RX ORDER — SPIRONOLACTONE 50 MG/1
100 TABLET, FILM COATED ORAL DAILY
Qty: 60 TABLET | Refills: 2 | Status: SHIPPED | OUTPATIENT
Start: 2019-10-15 | End: 2020-01-28 | Stop reason: SDUPTHER

## 2019-10-15 RX ORDER — FLUTICASONE PROPIONATE 50 MCG
2 SPRAY, SUSPENSION (ML) NASAL DAILY
Qty: 1 BOTTLE | Refills: 5 | Status: SHIPPED | OUTPATIENT
Start: 2019-10-15 | End: 2020-03-24 | Stop reason: SDUPTHER

## 2019-10-15 RX ORDER — NITROFURANTOIN 25; 75 MG/1; MG/1
CAPSULE ORAL
Qty: 20 CAPSULE | Refills: 1 | Status: SHIPPED | OUTPATIENT
Start: 2019-10-15 | End: 2020-01-28

## 2019-10-15 RX ORDER — HYDROCODONE BITARTRATE AND ACETAMINOPHEN 10; 325 MG/1; MG/1
TABLET ORAL
Qty: 100 TABLET | Refills: 0 | Status: SHIPPED | OUTPATIENT
Start: 2019-10-15 | End: 2019-12-10 | Stop reason: SDUPTHER

## 2019-10-15 RX ORDER — CYCLOBENZAPRINE HCL 10 MG
10 TABLET ORAL DAILY
Qty: 30 TABLET | Refills: 4 | Status: SHIPPED | OUTPATIENT
Start: 2019-10-15 | End: 2020-03-24 | Stop reason: SDUPTHER

## 2019-10-15 NOTE — PROGRESS NOTES
Med Refill    Subjective   Subha Patino is a 39 y.o. female is here today for follow-up.    History of Present Illness   Subha is here for a f/u on her chronic LBP and arthralgias. Well controlled on current regimen. Needs refills.  She also would liek arefill on her thyroid meds, as due to her insurance change, unable to see Dr. Roth either. Will change to a different insurance, this open enrollment.    Current Outpatient Medications:   •  buPROPion XL (WELLBUTRIN XL) 300 MG 24 hr tablet, Take 1 tablet by mouth Daily., Disp: 30 tablet, Rfl: 5  •  busPIRone (BUSPAR) 5 MG tablet, Take 10 mg by mouth 3 (Three) Times a Day., Disp: , Rfl:   •  Cyanocobalamin (VITAMIN B-12) 5000 MCG sublingual tablet, Take 5,000 mcg by mouth Daily., Disp: 90 tablet, Rfl: 1  •  cyclobenzaprine (FLEXERIL) 10 MG tablet, Take 1 tablet by mouth Daily., Disp: 30 tablet, Rfl: 4  •  fluticasone (FLONASE) 50 MCG/ACT nasal spray, 2 sprays by Each Nare route Daily., Disp: 1 bottle, Rfl: 5  •  HYDROcodone-acetaminophen (NORCO)  MG per tablet, Take 1 PO TID and as needed., Disp: 100 tablet, Rfl: 0  •  levothyroxine (SYNTHROID) 88 MCG tablet, Take 1 tablet by mouth Daily. Last refill without appt, Disp: 30 tablet, Rfl: 3  •  nitrofurantoin, macrocrystal-monohydrate, (MACROBID) 100 MG capsule, Use prn after intercourse, Disp: 20 capsule, Rfl: 1  •  spironolactone (ALDACTONE) 50 MG tablet, Take 2 tablets by mouth Daily., Disp: 60 tablet, Rfl: 2  •  loratadine (CLARITIN) 10 MG tablet, Take 1 tablet by mouth Daily., Disp: 90 tablet, Rfl: 1      The following portions of the patient's history were reviewed and updated as appropriate: allergies, current medications, past family history, past medical history, past social history, past surgical history and problem list.    Review of Systems   Constitutional: Negative.  Negative for chills and fever.   HENT: Negative for ear discharge, ear pain, sinus pressure and sore throat.    Respiratory:  "Negative for cough, chest tightness and shortness of breath.    Cardiovascular: Negative for chest pain, palpitations and leg swelling.   Gastrointestinal: Negative for diarrhea, nausea and vomiting.   Musculoskeletal: Positive for arthralgias, back pain and myalgias.   Neurological: Positive for headaches. Negative for dizziness and syncope.   Psychiatric/Behavioral: Negative for confusion and sleep disturbance.       Objective   /84   Pulse 89   Ht 165.1 cm (65\")   Wt 68.1 kg (150 lb 3.2 oz)   SpO2 98% Comment: ra  BMI 24.99 kg/m²   Physical Exam   Constitutional: She is oriented to person, place, and time. She appears well-developed and well-nourished.   HENT:   Head: Normocephalic and atraumatic.   Mouth/Throat: No oropharyngeal exudate.   Eyes: Conjunctivae are normal. Pupils are equal, round, and reactive to light.   Neck: Neck supple. No thyromegaly present.   Cardiovascular: Normal rate and regular rhythm.   Pulmonary/Chest: Effort normal and breath sounds normal.   Abdominal: Soft. Bowel sounds are normal. She exhibits no distension. There is no tenderness.   Musculoskeletal: She exhibits tenderness. She exhibits no edema.   Neurological: She is alert and oriented to person, place, and time. No cranial nerve deficit.   Skin: Skin is warm and dry.   Psychiatric: She has a normal mood and affect. Judgment normal.   Nursing note and vitals reviewed.        Results for orders placed or performed in visit on 09/13/18   CBC (No Diff)   Result Value Ref Range    WBC 11.33 (H) 3.50 - 10.80 10*3/mm3    RBC 4.77 3.89 - 5.14 10*6/mm3    Hemoglobin 15.7 (H) 11.5 - 15.5 g/dL    Hematocrit 45.4 (H) 34.5 - 44.0 %    MCV 95.2 80.0 - 99.0 fL    MCH 32.9 (H) 27.0 - 31.0 pg    MCHC 34.6 32.0 - 36.0 g/dL    RDW 11.8 11.3 - 14.5 %    RDW-SD 41.3 37.0 - 54.0 fl    MPV 10.1 6.0 - 12.0 fL    Platelets 245 150 - 450 10*3/mm3   Urinalysis With Culture If Indicated - Urine, Clean Catch   Result Value Ref Range    Color, UA " Yellow Yellow, Straw    Appearance, UA Cloudy (A) Clear    pH, UA 5.5 5.0 - 8.0    Specific Gravity, UA 1.032 (H) 1.001 - 1.030    Glucose, UA Negative Negative    Ketones, UA Trace (A) Negative    Bilirubin, UA Negative Negative    Blood, UA Negative Negative    Protein, UA Trace (A) Negative    Leuk Esterase, UA Negative Negative    Nitrite, UA Negative Negative    Urobilinogen, UA 1.0 E.U./dL 0.2 - 1.0 E.U./dL   Pregnancy, Urine - Urine, Clean Catch   Result Value Ref Range    HCG, Urine QL Negative Negative   TSH   Result Value Ref Range    TSH 2.488 0.350 - 5.350 mIU/mL   T4   Result Value Ref Range    T4, Total 11.70 (H) 4.70 - 11.40 mcg/dL   Urinalysis, Microscopic Only - Urine, Clean Catch   Result Value Ref Range    RBC, UA 3-6 (A) None Seen, 0-2 /HPF    WBC, UA 3-5 (A) None Seen, 0-2 /HPF    Bacteria, UA None Seen None Seen, Trace /HPF    Squamous Epithelial Cells, UA 13-20 (A) None Seen, 0-2 /HPF    Hyaline Casts, UA 0-6 0 - 6 /LPF    Methodology Manual Light Microscopy              Assessment/Plan   Diagnoses and all orders for this visit:    Bilateral low back pain with sciatica, sciatica laterality unspecified, unspecified chronicity  -     Discontinue: HYDROcodone-acetaminophen (NORCO)  MG per tablet; Take 1 PO TID and as needed.  -     HYDROcodone-acetaminophen (NORCO)  MG per tablet; Take 1 PO TID and as needed.    Recurrent UTI  -     nitrofurantoin, macrocrystal-monohydrate, (MACROBID) 100 MG capsule; Use prn after intercourse    Acne, unspecified acne type  -     spironolactone (ALDACTONE) 50 MG tablet; Take 2 tablets by mouth Daily.    Acne cystica  -     spironolactone (ALDACTONE) 50 MG tablet; Take 2 tablets by mouth Daily.    Acquired hypothyroidism  -     levothyroxine (SYNTHROID) 88 MCG tablet; Take 1 tablet by mouth Daily. Last refill without appt    Other orders  -     Cyanocobalamin (VITAMIN B-12) 5000 MCG sublingual tablet; Take 5,000 mcg by mouth Daily.  -      cyclobenzaprine (FLEXERIL) 10 MG tablet; Take 1 tablet by mouth Daily.  -     fluticasone (FLONASE) 50 MCG/ACT nasal spray; 2 sprays by Each Nare route Daily.          The patient has read and signed the [facility] Controlled Substance Contract.  I will continue to see patient for regular follow up appointments.  They are well controlled on their medication.  MARJORIE has been reviewed by me and is updated every 3 months. The patient is aware of the potential for addiction and dependence.        Adv to get appt with Gabriela Roth in Jan at f/u.  Return in about 2 months (around 12/15/2019) for Next scheduled follow up.

## 2019-12-10 ENCOUNTER — OFFICE VISIT (OUTPATIENT)
Dept: INTERNAL MEDICINE | Facility: CLINIC | Age: 39
End: 2019-12-10

## 2019-12-10 VITALS
HEART RATE: 75 BPM | HEIGHT: 65 IN | SYSTOLIC BLOOD PRESSURE: 138 MMHG | DIASTOLIC BLOOD PRESSURE: 80 MMHG | BODY MASS INDEX: 25.72 KG/M2 | WEIGHT: 154.4 LBS | OXYGEN SATURATION: 99 %

## 2019-12-10 DIAGNOSIS — E03.9 ACQUIRED HYPOTHYROIDISM: ICD-10-CM

## 2019-12-10 DIAGNOSIS — M54.40 BILATERAL LOW BACK PAIN WITH SCIATICA, SCIATICA LATERALITY UNSPECIFIED, UNSPECIFIED CHRONICITY: ICD-10-CM

## 2019-12-10 PROCEDURE — 99212 OFFICE O/P EST SF 10 MIN: CPT | Performed by: INTERNAL MEDICINE

## 2019-12-10 RX ORDER — HYDROCODONE BITARTRATE AND ACETAMINOPHEN 10; 325 MG/1; MG/1
TABLET ORAL
Qty: 100 TABLET | Refills: 0 | Status: SHIPPED | OUTPATIENT
Start: 2019-12-10 | End: 2020-01-28 | Stop reason: SDUPTHER

## 2019-12-10 RX ORDER — HYDROCODONE BITARTRATE AND ACETAMINOPHEN 10; 325 MG/1; MG/1
TABLET ORAL
Qty: 100 TABLET | Refills: 0 | Status: SHIPPED | OUTPATIENT
Start: 2019-12-10 | End: 2019-12-10 | Stop reason: SDUPTHER

## 2019-12-10 RX ORDER — LEVOTHYROXINE SODIUM 88 UG/1
88 TABLET ORAL DAILY
Qty: 90 TABLET | Refills: 0 | Status: SHIPPED | OUTPATIENT
Start: 2019-12-10 | End: 2020-01-28 | Stop reason: SDUPTHER

## 2019-12-10 NOTE — PROGRESS NOTES
Back Pain    Subjective   Subha Patino is a 39 y.o. female is here today for follow-up.    History of Present Illness   Subha is here for a follow up on chronic lbp.Insurance changed.  Rx changed.  Pain in  the base of her thumbs      Current Outpatient Medications:   •  buPROPion XL (WELLBUTRIN XL) 300 MG 24 hr tablet, Take 1 tablet by mouth Daily., Disp: 30 tablet, Rfl: 5  •  busPIRone (BUSPAR) 5 MG tablet, Take 10 mg by mouth 3 (Three) Times a Day., Disp: , Rfl:   •  Cyanocobalamin (VITAMIN B-12) 5000 MCG sublingual tablet, Take 5,000 mcg by mouth Daily., Disp: 90 tablet, Rfl: 1  •  cyclobenzaprine (FLEXERIL) 10 MG tablet, Take 1 tablet by mouth Daily., Disp: 30 tablet, Rfl: 4  •  fluticasone (FLONASE) 50 MCG/ACT nasal spray, 2 sprays by Each Nare route Daily., Disp: 1 bottle, Rfl: 5  •  loratadine (CLARITIN) 10 MG tablet, Take 1 tablet by mouth Daily., Disp: 90 tablet, Rfl: 1  •  nitrofurantoin, macrocrystal-monohydrate, (MACROBID) 100 MG capsule, Use prn after intercourse, Disp: 20 capsule, Rfl: 1  •  spironolactone (ALDACTONE) 50 MG tablet, Take 2 tablets by mouth Daily., Disp: 60 tablet, Rfl: 2  •  HYDROcodone-acetaminophen (NORCO)  MG per tablet, Take 1 PO TID and as needed. Fill after 1/10/20, Disp: 100 tablet, Rfl: 0  •  levothyroxine (SYNTHROID) 88 MCG tablet, Take 1 tablet by mouth Daily., Disp: 90 tablet, Rfl: 0      The following portions of the patient's history were reviewed and updated as appropriate: allergies, current medications, past family history, past medical history, past social history, past surgical history and problem list.    Review of Systems   Constitutional: Negative.  Negative for chills and fever.   HENT: Negative for ear discharge, ear pain, sinus pressure and sore throat.    Respiratory: Negative for cough, chest tightness and shortness of breath.    Cardiovascular: Negative for chest pain, palpitations and leg swelling.   Gastrointestinal: Negative for diarrhea, nausea  "and vomiting.   Musculoskeletal: Positive for arthralgias, back pain and myalgias.   Neurological: Negative for dizziness, syncope and headaches.   Psychiatric/Behavioral: Negative for confusion and sleep disturbance.       Objective   /80   Pulse 75   Ht 165.1 cm (65\")   Wt 70 kg (154 lb 6.4 oz)   SpO2 99% Comment: alyson  BMI 25.69 kg/m²   Physical Exam   Constitutional: She is oriented to person, place, and time. She appears well-developed and well-nourished.   HENT:   Head: Normocephalic and atraumatic.   Right Ear: External ear normal.   Left Ear: External ear normal.   Mouth/Throat: No oropharyngeal exudate.   Eyes: Pupils are equal, round, and reactive to light. Conjunctivae are normal.   Neck: Neck supple. No thyromegaly present.   Cardiovascular: Normal rate and regular rhythm.   Pulmonary/Chest: Effort normal and breath sounds normal.   Abdominal: Soft. Bowel sounds are normal. She exhibits no distension. There is no tenderness.   Musculoskeletal: She exhibits no edema.   Neurological: She is alert and oriented to person, place, and time. No cranial nerve deficit.   Skin: Skin is warm and dry.   Psychiatric: She has a normal mood and affect. Judgment normal.   Nursing note and vitals reviewed.        Results for orders placed or performed in visit on 09/13/18   CBC (No Diff)   Result Value Ref Range    WBC 11.33 (H) 3.50 - 10.80 10*3/mm3    RBC 4.77 3.89 - 5.14 10*6/mm3    Hemoglobin 15.7 (H) 11.5 - 15.5 g/dL    Hematocrit 45.4 (H) 34.5 - 44.0 %    MCV 95.2 80.0 - 99.0 fL    MCH 32.9 (H) 27.0 - 31.0 pg    MCHC 34.6 32.0 - 36.0 g/dL    RDW 11.8 11.3 - 14.5 %    RDW-SD 41.3 37.0 - 54.0 fl    MPV 10.1 6.0 - 12.0 fL    Platelets 245 150 - 450 10*3/mm3   Urinalysis With Culture If Indicated - Urine, Clean Catch   Result Value Ref Range    Color, UA Yellow Yellow, Straw    Appearance, UA Cloudy (A) Clear    pH, UA 5.5 5.0 - 8.0    Specific Gravity, UA 1.032 (H) 1.001 - 1.030    Glucose, UA Negative " Negative    Ketones, UA Trace (A) Negative    Bilirubin, UA Negative Negative    Blood, UA Negative Negative    Protein, UA Trace (A) Negative    Leuk Esterase, UA Negative Negative    Nitrite, UA Negative Negative    Urobilinogen, UA 1.0 E.U./dL 0.2 - 1.0 E.U./dL   Pregnancy, Urine - Urine, Clean Catch   Result Value Ref Range    HCG, Urine QL Negative Negative   TSH   Result Value Ref Range    TSH 2.488 0.350 - 5.350 mIU/mL   T4   Result Value Ref Range    T4, Total 11.70 (H) 4.70 - 11.40 mcg/dL   Urinalysis, Microscopic Only - Urine, Clean Catch   Result Value Ref Range    RBC, UA 3-6 (A) None Seen, 0-2 /HPF    WBC, UA 3-5 (A) None Seen, 0-2 /HPF    Bacteria, UA None Seen None Seen, Trace /HPF    Squamous Epithelial Cells, UA 13-20 (A) None Seen, 0-2 /HPF    Hyaline Casts, UA 0-6 0 - 6 /LPF    Methodology Manual Light Microscopy              Assessment/Plan   Diagnoses and all orders for this visit:    Acquired hypothyroidism  -     levothyroxine (SYNTHROID) 88 MCG tablet; Take 1 tablet by mouth Daily.    Bilateral low back pain with sciatica, sciatica laterality unspecified, unspecified chronicity  -     Discontinue: HYDROcodone-acetaminophen (NORCO)  MG per tablet; Take 1 PO TID and as needed.  -     HYDROcodone-acetaminophen (NORCO)  MG per tablet; Take 1 PO TID and as needed.  Fill after 1/10/20    The patient has read and signed the [facility] Controlled Substance Contract.  I will continue to see patient for regular follow up appointments.  They are well controlled on their medication.  MARJORIE has been reviewed by me and is updated every 3 months. The patient is aware of the potential for addiction and dependence.               Return in about 2 months (around 2/10/2020) for Next scheduled follow up at 10.15 on 1/28/19  and Physical with pap end of May.

## 2020-01-28 ENCOUNTER — OFFICE VISIT (OUTPATIENT)
Dept: ENDOCRINOLOGY | Facility: CLINIC | Age: 40
End: 2020-01-28

## 2020-01-28 ENCOUNTER — OFFICE VISIT (OUTPATIENT)
Dept: INTERNAL MEDICINE | Facility: CLINIC | Age: 40
End: 2020-01-28

## 2020-01-28 VITALS
HEIGHT: 65 IN | HEART RATE: 60 BPM | WEIGHT: 154.6 LBS | DIASTOLIC BLOOD PRESSURE: 70 MMHG | SYSTOLIC BLOOD PRESSURE: 110 MMHG | BODY MASS INDEX: 25.76 KG/M2

## 2020-01-28 VITALS
SYSTOLIC BLOOD PRESSURE: 110 MMHG | DIASTOLIC BLOOD PRESSURE: 70 MMHG | HEIGHT: 65 IN | OXYGEN SATURATION: 98 % | HEART RATE: 60 BPM | BODY MASS INDEX: 25.77 KG/M2 | WEIGHT: 154.7 LBS

## 2020-01-28 DIAGNOSIS — E78.5 DYSLIPIDEMIA: ICD-10-CM

## 2020-01-28 DIAGNOSIS — E53.8 VITAMIN B12 DEFICIENCY: Primary | ICD-10-CM

## 2020-01-28 DIAGNOSIS — M54.40 BILATERAL LOW BACK PAIN WITH SCIATICA, SCIATICA LATERALITY UNSPECIFIED, UNSPECIFIED CHRONICITY: ICD-10-CM

## 2020-01-28 DIAGNOSIS — R73.9 HYPERGLYCEMIA: ICD-10-CM

## 2020-01-28 DIAGNOSIS — E03.9 ACQUIRED HYPOTHYROIDISM: Primary | ICD-10-CM

## 2020-01-28 DIAGNOSIS — L70.9 ACNE, UNSPECIFIED ACNE TYPE: ICD-10-CM

## 2020-01-28 LAB
25(OH)D3 SERPL-MCNC: 40.2 NG/ML (ref 30–100)
ALBUMIN SERPL-MCNC: 4.3 G/DL (ref 3.5–5.2)
ALBUMIN/GLOB SERPL: 2.4 G/DL
ALP SERPL-CCNC: 63 U/L (ref 39–117)
ALT SERPL W P-5'-P-CCNC: 14 U/L (ref 1–33)
ANION GAP SERPL CALCULATED.3IONS-SCNC: 15 MMOL/L (ref 5–15)
AST SERPL-CCNC: 15 U/L (ref 1–32)
BILIRUB SERPL-MCNC: 0.3 MG/DL (ref 0.2–1.2)
BUN BLD-MCNC: 18 MG/DL (ref 6–20)
BUN/CREAT SERPL: 22 (ref 7–25)
CALCIUM SPEC-SCNC: 9 MG/DL (ref 8.6–10.5)
CHLORIDE SERPL-SCNC: 98 MMOL/L (ref 98–107)
CHOLEST SERPL-MCNC: 133 MG/DL (ref 0–200)
CO2 SERPL-SCNC: 24 MMOL/L (ref 22–29)
CREAT BLD-MCNC: 0.82 MG/DL (ref 0.57–1)
GFR SERPL CREATININE-BSD FRML MDRD: 77 ML/MIN/1.73
GLOBULIN UR ELPH-MCNC: 1.8 GM/DL
GLUCOSE BLD-MCNC: 82 MG/DL (ref 65–99)
HBA1C MFR BLD: 5.12 % (ref 4.8–5.6)
HDLC SERPL-MCNC: 54 MG/DL (ref 40–60)
LDLC SERPL CALC-MCNC: 51 MG/DL (ref 0–100)
LDLC/HDLC SERPL: 0.94 {RATIO}
POTASSIUM BLD-SCNC: 3.9 MMOL/L (ref 3.5–5.2)
PROT SERPL-MCNC: 6.1 G/DL (ref 6–8.5)
SODIUM BLD-SCNC: 137 MMOL/L (ref 136–145)
T4 FREE SERPL-MCNC: 1.36 NG/DL (ref 0.93–1.7)
TRIGL SERPL-MCNC: 140 MG/DL (ref 0–150)
TSH SERPL DL<=0.05 MIU/L-ACNC: 11.1 UIU/ML (ref 0.27–4.2)
VIT B12 BLD-MCNC: 706 PG/ML (ref 211–946)
VLDLC SERPL-MCNC: 28 MG/DL (ref 5–40)

## 2020-01-28 PROCEDURE — 99213 OFFICE O/P EST LOW 20 MIN: CPT | Performed by: INTERNAL MEDICINE

## 2020-01-28 PROCEDURE — 80053 COMPREHEN METABOLIC PANEL: CPT | Performed by: INTERNAL MEDICINE

## 2020-01-28 PROCEDURE — 82607 VITAMIN B-12: CPT | Performed by: INTERNAL MEDICINE

## 2020-01-28 PROCEDURE — 82306 VITAMIN D 25 HYDROXY: CPT | Performed by: INTERNAL MEDICINE

## 2020-01-28 PROCEDURE — 84443 ASSAY THYROID STIM HORMONE: CPT | Performed by: INTERNAL MEDICINE

## 2020-01-28 PROCEDURE — 84439 ASSAY OF FREE THYROXINE: CPT | Performed by: INTERNAL MEDICINE

## 2020-01-28 PROCEDURE — 80061 LIPID PANEL: CPT | Performed by: INTERNAL MEDICINE

## 2020-01-28 PROCEDURE — 83036 HEMOGLOBIN GLYCOSYLATED A1C: CPT | Performed by: INTERNAL MEDICINE

## 2020-01-28 RX ORDER — HYDROCODONE BITARTRATE AND ACETAMINOPHEN 10; 325 MG/1; MG/1
1 TABLET ORAL EVERY 8 HOURS PRN
Qty: 90 TABLET | Refills: 0 | Status: SHIPPED | OUTPATIENT
Start: 2020-01-28 | End: 2020-01-28 | Stop reason: SDUPTHER

## 2020-01-28 RX ORDER — HYDROCODONE BITARTRATE AND ACETAMINOPHEN 10; 325 MG/1; MG/1
TABLET ORAL
Qty: 100 TABLET | Refills: 0 | Status: SHIPPED | OUTPATIENT
Start: 2020-01-28 | End: 2020-01-28 | Stop reason: SDUPTHER

## 2020-01-28 RX ORDER — SPIRONOLACTONE 50 MG/1
100 TABLET, FILM COATED ORAL DAILY
Qty: 60 TABLET | Refills: 11 | Status: SHIPPED | OUTPATIENT
Start: 2020-01-28 | End: 2020-12-15 | Stop reason: SDUPTHER

## 2020-01-28 RX ORDER — LEVOTHYROXINE SODIUM 88 MCG
88 TABLET ORAL DAILY
Qty: 30 TABLET | Refills: 11 | Status: SHIPPED | OUTPATIENT
Start: 2020-01-28 | End: 2020-02-06 | Stop reason: DRUGHIGH

## 2020-01-28 RX ORDER — HYDROCODONE BITARTRATE AND ACETAMINOPHEN 10; 325 MG/1; MG/1
1 TABLET ORAL EVERY 8 HOURS PRN
Qty: 90 TABLET | Refills: 0 | Status: SHIPPED | OUTPATIENT
Start: 2020-01-28 | End: 2020-03-24 | Stop reason: SDUPTHER

## 2020-01-28 NOTE — PROGRESS NOTES
Chief complaint  Hypothyroidism (Follow UP:  Needs [facility] thyroid RX. reactions to generic ) and Vitamin D Deficiency    Subjective   Subha Patino is a 39 y.o. female is here today for follow-up.    Hypothyroidism       Follow-up for  Hypothyroidism:  Current treatment includes Synthroid 88 mcg.     Previous meds: generic levothyroxine triggered palpitations, Tirosint was great but expensive. Synthroid seem to work best for her.   U/s thyroid in 2013 - small multiple nodules with largest measuring 3 mm.     Sprinoloactone started for acne and it is effective. She is taking 50 mg daily     Medications    Current Outpatient Medications:   •  busPIRone (BUSPAR) 5 MG tablet, Take 10 mg by mouth 3 (Three) Times a Day., Disp: , Rfl:   •  Cyanocobalamin (VITAMIN B-12) 5000 MCG sublingual tablet, Take 5,000 mcg by mouth Daily., Disp: 90 tablet, Rfl: 1  •  cyclobenzaprine (FLEXERIL) 10 MG tablet, Take 1 tablet by mouth Daily., Disp: 30 tablet, Rfl: 4  •  fluticasone (FLONASE) 50 MCG/ACT nasal spray, 2 sprays by Each Nare route Daily., Disp: 1 bottle, Rfl: 5  •  HYDROcodone-acetaminophen (NORCO)  MG per tablet, Take 1 tablet by mouth Every 8 (Eight) Hours As Needed for Moderate Pain . Fill after 3/9/2020, Disp: 90 tablet, Rfl: 0  •  loratadine (CLARITIN) 10 MG tablet, Take 1 tablet by mouth Daily., Disp: 90 tablet, Rfl: 1  •  spironolactone (ALDACTONE) 50 MG tablet, Take 2 tablets by mouth Daily., Disp: 60 tablet, Rfl: 11  •  SYNTHROID 88 MCG tablet, Take 1 tablet by mouth Daily., Disp: 30 tablet, Rfl: 11    PMH  The following portions of the patient's history were reviewed and updated as appropriate: allergies, current medications, past family history, past medical history, past social history, past surgical history and problem list.    Review of systems  Review of Systems   Cardiovascular: Negative for palpitations (intermittent).   Psychiatric/Behavioral: The patient is not nervous/anxious.    All other  "systems reviewed and are negative.      Physical exam  Objective   Blood pressure 110/70, pulse 60, height 165.1 cm (65\"), weight 70.2 kg (154 lb 11.2 oz), SpO2 98 %.   Physical Exam   Constitutional: She is oriented to person, place, and time. She appears well-developed and well-nourished.   HENT:   Head: Normocephalic and atraumatic.   Eyes: Conjunctivae are normal.   Neck: Normal range of motion. No muscular tenderness present. Carotid bruit is not present. No thyroid mass and no thyromegaly present.   The neck is supple and no assimetry visualized   Cardiovascular: Normal rate, regular rhythm and normal heart sounds.   Pulmonary/Chest: Effort normal and breath sounds normal.   Lymphadenopathy:     She has no cervical adenopathy.   Neurological: She is alert and oriented to person, place, and time.   Psychiatric: She has a normal mood and affect. Thought content normal.   Vitals reviewed.        LABS AND IMAGING  Lab Results   Component Value Date    TSH 11.100 (H) 01/28/2020    [id number] 11.70 (H) 09/13/2018           Assessment  Assessment/Plan   Problem List Items Addressed This Visit        Endocrine    Hypothyroidism - Primary    Relevant Medications    SYNTHROID 88 MCG tablet    Other Relevant Orders    T4, Free (Completed)    TSH (Completed)      Other Visit Diagnoses     Acne, unspecified acne type        Relevant Medications    spironolactone (ALDACTONE) 50 MG tablet          Plan  Synthroid 88 mcg increased to 100 mcg daily , repeat labs showed low thyroid function   -continue spironolactone, it was started for acne.   Follow-up in 9-12 months  "

## 2020-01-31 ENCOUNTER — TELEPHONE (OUTPATIENT)
Dept: ENDOCRINOLOGY | Facility: CLINIC | Age: 40
End: 2020-01-31

## 2020-02-04 ENCOUNTER — TELEPHONE (OUTPATIENT)
Dept: ENDOCRINOLOGY | Facility: CLINIC | Age: 40
End: 2020-02-04

## 2020-02-04 NOTE — TELEPHONE ENCOUNTER
Pt is calling asking if she needs to come in to get labs redrawn due to being on the steroid and some levels being high.

## 2020-02-06 DIAGNOSIS — E03.9 ACQUIRED HYPOTHYROIDISM: ICD-10-CM

## 2020-02-06 DIAGNOSIS — E03.9 ACQUIRED HYPOTHYROIDISM: Primary | ICD-10-CM

## 2020-02-06 RX ORDER — LEVOTHYROXINE SODIUM 0.1 MG/1
100 TABLET ORAL DAILY
Qty: 30 TABLET | Refills: 4 | Status: SHIPPED | OUTPATIENT
Start: 2020-02-06 | End: 2020-04-29

## 2020-02-07 NOTE — TELEPHONE ENCOUNTER
Please let patient know that all her labs look good except for her thyroid levels.  Dr. Roth has already changed the dose of her thyroid medication and she needs to take it as directed.  They are not affected by her being on steroids so no need for repeat labs

## 2020-03-24 ENCOUNTER — LAB (OUTPATIENT)
Dept: LAB | Facility: HOSPITAL | Age: 40
End: 2020-03-24

## 2020-03-24 ENCOUNTER — OFFICE VISIT (OUTPATIENT)
Dept: INTERNAL MEDICINE | Facility: CLINIC | Age: 40
End: 2020-03-24

## 2020-03-24 VITALS
OXYGEN SATURATION: 98 % | TEMPERATURE: 98 F | HEART RATE: 64 BPM | BODY MASS INDEX: 25.59 KG/M2 | DIASTOLIC BLOOD PRESSURE: 86 MMHG | SYSTOLIC BLOOD PRESSURE: 118 MMHG | HEIGHT: 65 IN | WEIGHT: 153.6 LBS

## 2020-03-24 DIAGNOSIS — E03.9 ACQUIRED HYPOTHYROIDISM: ICD-10-CM

## 2020-03-24 DIAGNOSIS — J30.2 SEASONAL ALLERGIC RHINITIS: ICD-10-CM

## 2020-03-24 DIAGNOSIS — M54.40 BILATERAL LOW BACK PAIN WITH SCIATICA, SCIATICA LATERALITY UNSPECIFIED, UNSPECIFIED CHRONICITY: ICD-10-CM

## 2020-03-24 LAB
T4 FREE SERPL-MCNC: 1.63 NG/DL (ref 0.93–1.7)
TSH SERPL DL<=0.05 MIU/L-ACNC: 1.4 UIU/ML (ref 0.27–4.2)

## 2020-03-24 PROCEDURE — 84439 ASSAY OF FREE THYROXINE: CPT | Performed by: INTERNAL MEDICINE

## 2020-03-24 PROCEDURE — 84443 ASSAY THYROID STIM HORMONE: CPT | Performed by: INTERNAL MEDICINE

## 2020-03-24 PROCEDURE — 99213 OFFICE O/P EST LOW 20 MIN: CPT | Performed by: INTERNAL MEDICINE

## 2020-03-24 RX ORDER — LORATADINE 10 MG/1
10 TABLET ORAL DAILY
Qty: 90 TABLET | Refills: 1 | Status: SHIPPED | OUTPATIENT
Start: 2020-03-24 | End: 2020-06-19 | Stop reason: SDUPTHER

## 2020-03-24 RX ORDER — FLUTICASONE PROPIONATE 50 MCG
2 SPRAY, SUSPENSION (ML) NASAL DAILY
Qty: 1 BOTTLE | Refills: 5 | Status: SHIPPED | OUTPATIENT
Start: 2020-03-24 | End: 2020-06-19 | Stop reason: SDUPTHER

## 2020-03-24 RX ORDER — HYDROCODONE BITARTRATE AND ACETAMINOPHEN 10; 325 MG/1; MG/1
1 TABLET ORAL EVERY 8 HOURS PRN
Qty: 90 TABLET | Refills: 0 | Status: SHIPPED | OUTPATIENT
Start: 2020-03-24 | End: 2020-03-24 | Stop reason: SDUPTHER

## 2020-03-24 RX ORDER — CYCLOBENZAPRINE HCL 10 MG
10 TABLET ORAL DAILY
Qty: 30 TABLET | Refills: 4 | Status: SHIPPED | OUTPATIENT
Start: 2020-03-24 | End: 2020-06-19 | Stop reason: SDUPTHER

## 2020-03-24 RX ORDER — HYDROCODONE BITARTRATE AND ACETAMINOPHEN 10; 325 MG/1; MG/1
1 TABLET ORAL EVERY 8 HOURS PRN
Qty: 90 TABLET | Refills: 0 | Status: SHIPPED | OUTPATIENT
Start: 2020-03-24 | End: 2020-06-19 | Stop reason: SDUPTHER

## 2020-03-24 NOTE — PROGRESS NOTES
Med Refill and Hypothyroidism    Subjective   Subha Patino is a 39 y.o. female is here today for follow-up.    History of Present Illness   Here for a follow up on her Chronic lbp, allergies. On lockdown due to Covid- not at work for appx a week.      Current Outpatient Medications:   •  busPIRone (BUSPAR) 5 MG tablet, Take 10 mg by mouth 3 (Three) Times a Day., Disp: , Rfl:   •  Cyanocobalamin (VITAMIN B-12) 5000 MCG sublingual tablet, Take 5,000 mcg by mouth Daily., Disp: 90 tablet, Rfl: 1  •  cyclobenzaprine (Flexeril) 10 MG tablet, Take 1 tablet by mouth Daily., Disp: 30 tablet, Rfl: 4  •  fluticasone (FLONASE) 50 MCG/ACT nasal spray, 2 sprays by Each Nare route Daily., Disp: 1 bottle, Rfl: 5  •  HYDROcodone-acetaminophen (NORCO)  MG per tablet, Take 1 tablet by mouth Every 8 (Eight) Hours As Needed for Moderate Pain . Fill after 5/7/2020, Disp: 90 tablet, Rfl: 0  •  levothyroxine (SYNTHROID) 100 MCG tablet, Take 1 tablet by mouth Daily., Disp: 30 tablet, Rfl: 4  •  loratadine (Claritin) 10 MG tablet, Take 1 tablet by mouth Daily., Disp: 90 tablet, Rfl: 1  •  spironolactone (ALDACTONE) 50 MG tablet, Take 2 tablets by mouth Daily., Disp: 60 tablet, Rfl: 11      The following portions of the patient's history were reviewed and updated as appropriate: allergies, current medications, past family history, past medical history, past social history, past surgical history and problem list.    Review of Systems   Constitutional: Negative.  Negative for chills and fever.   HENT: Negative for ear discharge, ear pain, sinus pressure and sore throat.    Respiratory: Negative for cough, chest tightness and shortness of breath.    Cardiovascular: Negative for chest pain, palpitations and leg swelling.   Gastrointestinal: Negative for diarrhea, nausea and vomiting.   Musculoskeletal: Positive for arthralgias, back pain and myalgias.   Neurological: Negative for dizziness, syncope and headaches.  "  Psychiatric/Behavioral: Negative for confusion and sleep disturbance.       Objective   /86   Pulse 64   Temp 98 °F (36.7 °C)   Ht 165.1 cm (65\")   Wt 69.7 kg (153 lb 9.6 oz)   SpO2 98% Comment: ra  BMI 25.56 kg/m²   Physical Exam   Constitutional: She is oriented to person, place, and time. She appears well-developed and well-nourished.   HENT:   Head: Normocephalic and atraumatic.   Mouth/Throat: No oropharyngeal exudate.   Eyes: Pupils are equal, round, and reactive to light. Conjunctivae are normal.   Neck: Neck supple. No thyromegaly present.   Cardiovascular: Normal rate and regular rhythm.   Pulmonary/Chest: Effort normal and breath sounds normal.   Abdominal: Soft. Bowel sounds are normal. She exhibits no distension. There is no tenderness.   Musculoskeletal: She exhibits tenderness. She exhibits no edema.   Neurological: She is alert and oriented to person, place, and time. No cranial nerve deficit.   Skin: Skin is warm and dry.   Psychiatric: She has a normal mood and affect. Judgment normal.   Nursing note and vitals reviewed.        Results for orders placed or performed in visit on 01/28/20   T4, Free   Result Value Ref Range    Free T4 1.36 0.93 - 1.70 ng/dL   TSH   Result Value Ref Range    TSH 11.100 (H) 0.270 - 4.200 uIU/mL             Assessment/Plan   Diagnoses and all orders for this visit:    Bilateral low back pain with sciatica, sciatica laterality unspecified, unspecified chronicity  -     Discontinue: HYDROcodone-acetaminophen (NORCO)  MG per tablet; Take 1 tablet by mouth Every 8 (Eight) Hours As Needed for Moderate Pain . Fill after 4/8/2020  -     HYDROcodone-acetaminophen (NORCO)  MG per tablet; Take 1 tablet by mouth Every 8 (Eight) Hours As Needed for Moderate Pain . Fill after 5/7/2020    Seasonal allergic rhinitis  -     loratadine (Claritin) 10 MG tablet; Take 1 tablet by mouth Daily.    Other orders  -     Cyanocobalamin (VITAMIN B-12) 5000 MCG sublingual " tablet; Take 5,000 mcg by mouth Daily.  -     cyclobenzaprine (Flexeril) 10 MG tablet; Take 1 tablet by mouth Daily.  -     fluticasone (FLONASE) 50 MCG/ACT nasal spray; 2 sprays by Each Nare route Daily.                 Return for Next scheduled follow up.

## 2020-04-29 RX ORDER — LEVOTHYROXINE SODIUM 0.1 MG/1
TABLET ORAL
Qty: 30 TABLET | Refills: 4 | Status: SHIPPED | OUTPATIENT
Start: 2020-04-29 | End: 2020-07-23

## 2020-06-02 ENCOUNTER — TELEPHONE (OUTPATIENT)
Dept: INTERNAL MEDICINE | Facility: CLINIC | Age: 40
End: 2020-06-02

## 2020-06-02 RX ORDER — VARENICLINE TARTRATE 1 MG/1
1 TABLET, FILM COATED ORAL 2 TIMES DAILY
Qty: 60 TABLET | Refills: 1 | Status: SHIPPED | OUTPATIENT
Start: 2020-06-02 | End: 2020-06-05 | Stop reason: SDUPTHER

## 2020-06-02 NOTE — TELEPHONE ENCOUNTER
PT CALLED TO REQUEST REFILL FOR CHANTIX. PT STATED THAT SHE IS DOWN TO 2-3 CIGARETTES A DAY AND CHANTIX IS HELPING HER.    PLEASE ADVISE.  CALL BACK:[phone]

## 2020-06-05 RX ORDER — VARENICLINE TARTRATE 1 MG/1
1 TABLET, FILM COATED ORAL 2 TIMES DAILY
Qty: 60 TABLET | Refills: 1 | Status: SHIPPED | OUTPATIENT
Start: 2020-06-05 | End: 2020-06-26

## 2020-06-05 NOTE — TELEPHONE ENCOUNTER
Verified pharmacy with pt, resent script.    Pt states her sister has been dx'ed with breast cancer and and would like to have the genetic testing done.  Pt does have an reuben to see you on 6/19/2020, but wanted to fabrizio things going if possible.  Please advise.

## 2020-06-05 NOTE — TELEPHONE ENCOUNTER
I will have to document it in her office notes when I do the referral, so we can do that at her visit.  Did she have her mammogram this year?  If not, we can set that up, as that's important as well.

## 2020-06-05 NOTE — TELEPHONE ENCOUNTER
PT STATED THAT THE PHARMACY HAS NOT RECEIVED THE ORDER FOR CHANTIX, I CALLED TO VERIFY AND CONFIRMED THAT IT HAD NOT BEEN RECEIVED

## 2020-06-19 ENCOUNTER — OFFICE VISIT (OUTPATIENT)
Dept: INTERNAL MEDICINE | Facility: CLINIC | Age: 40
End: 2020-06-19

## 2020-06-19 ENCOUNTER — TRANSCRIBE ORDERS (OUTPATIENT)
Dept: INTERNAL MEDICINE | Facility: CLINIC | Age: 40
End: 2020-06-19

## 2020-06-19 ENCOUNTER — LAB (OUTPATIENT)
Dept: LAB | Facility: HOSPITAL | Age: 40
End: 2020-06-19

## 2020-06-19 VITALS
OXYGEN SATURATION: 99 % | TEMPERATURE: 98 F | HEART RATE: 72 BPM | DIASTOLIC BLOOD PRESSURE: 82 MMHG | HEIGHT: 65 IN | BODY MASS INDEX: 25.26 KG/M2 | WEIGHT: 151.6 LBS | SYSTOLIC BLOOD PRESSURE: 124 MMHG

## 2020-06-19 DIAGNOSIS — Z12.31 VISIT FOR SCREENING MAMMOGRAM: Primary | ICD-10-CM

## 2020-06-19 DIAGNOSIS — Z80.3 FAMILY HISTORY OF BREAST CANCER: ICD-10-CM

## 2020-06-19 DIAGNOSIS — M54.40 BILATERAL LOW BACK PAIN WITH SCIATICA, SCIATICA LATERALITY UNSPECIFIED, UNSPECIFIED CHRONICITY: ICD-10-CM

## 2020-06-19 DIAGNOSIS — J30.2 SEASONAL ALLERGIC RHINITIS: ICD-10-CM

## 2020-06-19 DIAGNOSIS — E53.8 VITAMIN B12 DEFICIENCY: ICD-10-CM

## 2020-06-19 DIAGNOSIS — M19.042 PRIMARY OSTEOARTHRITIS OF BOTH HANDS: ICD-10-CM

## 2020-06-19 DIAGNOSIS — Z00.00 ANNUAL PHYSICAL EXAM: ICD-10-CM

## 2020-06-19 DIAGNOSIS — M19.041 PRIMARY OSTEOARTHRITIS OF BOTH HANDS: ICD-10-CM

## 2020-06-19 DIAGNOSIS — Z00.00 ANNUAL PHYSICAL EXAM: Primary | ICD-10-CM

## 2020-06-19 LAB
25(OH)D3 SERPL-MCNC: 56.4 NG/ML (ref 30–100)
BILIRUB BLD-MCNC: NEGATIVE MG/DL
CLARITY, POC: CLEAR
COLOR UR: YELLOW
DEPRECATED RDW RBC AUTO: 41.8 FL (ref 37–54)
ERYTHROCYTE [DISTWIDTH] IN BLOOD BY AUTOMATED COUNT: 11.6 % (ref 12.3–15.4)
GLUCOSE UR STRIP-MCNC: NEGATIVE MG/DL
HCT VFR BLD AUTO: 45.2 % (ref 34–46.6)
HCV AB SER DONR QL: NORMAL
HGB BLD-MCNC: 15.4 G/DL (ref 12–15.9)
KETONES UR QL: NEGATIVE
LEUKOCYTE EST, POC: NEGATIVE
MCH RBC QN AUTO: 33 PG (ref 26.6–33)
MCHC RBC AUTO-ENTMCNC: 34.1 G/DL (ref 31.5–35.7)
MCV RBC AUTO: 97 FL (ref 79–97)
NITRITE UR-MCNC: NEGATIVE MG/ML
PH UR: 8 [PH] (ref 5–8)
PLATELET # BLD AUTO: 257 10*3/MM3 (ref 140–450)
PMV BLD AUTO: 11.8 FL (ref 6–12)
PROT UR STRIP-MCNC: NEGATIVE MG/DL
RBC # BLD AUTO: 4.66 10*6/MM3 (ref 3.77–5.28)
RBC # UR STRIP: NEGATIVE /UL
SP GR UR: 1.01 (ref 1–1.03)
UROBILINOGEN UR QL: NORMAL
WBC NRBC COR # BLD: 9.71 10*3/MM3 (ref 3.4–10.8)

## 2020-06-19 PROCEDURE — 85027 COMPLETE CBC AUTOMATED: CPT | Performed by: INTERNAL MEDICINE

## 2020-06-19 PROCEDURE — 80053 COMPREHEN METABOLIC PANEL: CPT | Performed by: INTERNAL MEDICINE

## 2020-06-19 PROCEDURE — 90471 IMMUNIZATION ADMIN: CPT | Performed by: INTERNAL MEDICINE

## 2020-06-19 PROCEDURE — 82306 VITAMIN D 25 HYDROXY: CPT | Performed by: INTERNAL MEDICINE

## 2020-06-19 PROCEDURE — 99213 OFFICE O/P EST LOW 20 MIN: CPT | Performed by: INTERNAL MEDICINE

## 2020-06-19 PROCEDURE — 84439 ASSAY OF FREE THYROXINE: CPT | Performed by: INTERNAL MEDICINE

## 2020-06-19 PROCEDURE — 90732 PPSV23 VACC 2 YRS+ SUBQ/IM: CPT | Performed by: INTERNAL MEDICINE

## 2020-06-19 PROCEDURE — 86803 HEPATITIS C AB TEST: CPT | Performed by: INTERNAL MEDICINE

## 2020-06-19 PROCEDURE — 84443 ASSAY THYROID STIM HORMONE: CPT | Performed by: INTERNAL MEDICINE

## 2020-06-19 PROCEDURE — 99396 PREV VISIT EST AGE 40-64: CPT | Performed by: INTERNAL MEDICINE

## 2020-06-19 PROCEDURE — 80061 LIPID PANEL: CPT | Performed by: INTERNAL MEDICINE

## 2020-06-19 RX ORDER — CYCLOBENZAPRINE HCL 10 MG
10 TABLET ORAL DAILY
Qty: 30 TABLET | Refills: 4 | Status: SHIPPED | OUTPATIENT
Start: 2020-06-19 | End: 2020-12-15 | Stop reason: SDUPTHER

## 2020-06-19 RX ORDER — LORATADINE 10 MG/1
10 TABLET ORAL DAILY
Qty: 90 TABLET | Refills: 1 | Status: SHIPPED | OUTPATIENT
Start: 2020-06-19 | End: 2021-01-15

## 2020-06-19 RX ORDER — HYDROCODONE BITARTRATE AND ACETAMINOPHEN 10; 325 MG/1; MG/1
1 TABLET ORAL EVERY 8 HOURS PRN
Qty: 90 TABLET | Refills: 0 | Status: SHIPPED | OUTPATIENT
Start: 2020-06-19 | End: 2020-06-19 | Stop reason: SDUPTHER

## 2020-06-19 RX ORDER — FLUTICASONE PROPIONATE 50 MCG
2 SPRAY, SUSPENSION (ML) NASAL DAILY
Qty: 1 BOTTLE | Refills: 5 | Status: SHIPPED | OUTPATIENT
Start: 2020-06-19 | End: 2021-01-15

## 2020-06-19 RX ORDER — HYDROCODONE BITARTRATE AND ACETAMINOPHEN 10; 325 MG/1; MG/1
1 TABLET ORAL EVERY 8 HOURS PRN
Qty: 90 TABLET | Refills: 0 | Status: SHIPPED | OUTPATIENT
Start: 2020-06-19 | End: 2020-08-18 | Stop reason: SDUPTHER

## 2020-06-19 NOTE — PROGRESS NOTES
Chief Complaint   Patient presents with   • Annual Exam     recall issues, fingers and toes are turning white, genetic testing   • Gynecologic Exam     periods every 2 weeks   • Hand Pain       History of Present Illness  HM, Adult Female: The patient is being seen for a health maintenance and gynecology evaluation. The last health maintenance visit was 1 year(s) ago.   Social History: She is . Work status:full time- Small business owner- [employer]  She continues to smoke. She reports never drinking alcohol. Denies any illicit drug use.  General Health: The patient's health is described as good. She has regular dental visits. She denies vision problems. She denies hearing loss. Immunizations status: up to date.   Lifestyle:. She consumes a diverse and healthy diet. She does not have any weight concerns. She exercises regularly. She denies tobacco currently. She denies alcohol use. She denies drug use.   Reproductive health:. She reports normal menses.periods are every 2 weeks .  Screening: Cancer screening reviewed and current.   Metabolic screening reviewed and current.   Risk screening reviewed and current. .     Sister going through breast ca, and mom had a MI.  Raynauds acting up.  B/l 1st thumb base joint, very painful.  Periods irregular, worse, after she increased her thyroid dose.      Review of Systems   Constitutional: Negative for chills and fatigue.   HENT: Negative for congestion, ear pain and sore throat.    Eyes: Negative for pain, redness and visual disturbance.   Respiratory: Negative for cough and shortness of breath.    Cardiovascular: Negative for chest pain, palpitations and leg swelling.   Gastrointestinal: Negative for abdominal pain, diarrhea and nausea.   Endocrine: Negative for cold intolerance and heat intolerance.   Genitourinary: Positive for menstrual problem. Negative for flank pain and urgency.   Musculoskeletal: Positive for arthralgias and myalgias. Negative for gait problem.  "  Skin: Negative for pallor and rash.   Neurological: Negative for dizziness, weakness and headaches.   Psychiatric/Behavioral: Positive for confusion and sleep disturbance. Negative for dysphoric mood. The patient is nervous/anxious.        Patient Active Problem List   Diagnosis   • Low back pain   • Reaction to chronic stress   • Hypothyroidism   • Actinic keratosis   • Allergic rhinitis   • Carpal tunnel syndrome   • Dyslipidemia   • Hyperglycemia   • Nontoxic single thyroid nodule   • Vitamin B12 deficiency   • Vitamin D deficiency   • Lump or mass in breast   • Current smoker   • Panic attacks       Social History     Socioeconomic History   • Marital status:      Spouse name: Not on file   • Number of children: Not on file   • Years of education: Not on file   • Highest education level: Not on file   Tobacco Use   • Smoking status: Current Every Day Smoker     Packs/day: 0.75     Years: 20.00     Pack years: 15.00     Types: Cigarettes     Last attempt to quit: 2018     Years since quittin.6   • Smokeless tobacco: Never Used   Substance and Sexual Activity   • Drug use: No   • Sexual activity: Defer       Current Outpatient Medications on File Prior to Visit   Medication Sig Dispense Refill   • busPIRone (BUSPAR) 5 MG tablet Take 10 mg by mouth 3 (Three) Times a Day.     • levothyroxine (SYNTHROID, LEVOTHROID) 100 MCG tablet TAKE 1 TABLET BY MOUTH EVERY DAY 30 tablet 4   • spironolactone (ALDACTONE) 50 MG tablet Take 2 tablets by mouth Daily. 60 tablet 11   • varenicline (Chantix Continuing Month Jorge) 1 MG tablet Take 1 tablet by mouth 2 (Two) Times a Day. 60 tablet 1     No current facility-administered medications on file prior to visit.        Allergies   Allergen Reactions   • Robitussin (Alcohol Free) [Guaifenesin] Other (See Comments)     Tachycardia       /82   Pulse 72   Temp 98 °F (36.7 °C)   Ht 165.1 cm (65\")   Wt 68.8 kg (151 lb 9.6 oz)   LMP 2020   SpO2 99% " Comment: ra  BMI 25.23 kg/m²            The following portions of the patient's history were reviewed and updated as appropriate: allergies, current medications, past family history, past medical history, past social history, past surgical history and problem list.    Physical Exam   Constitutional: She is oriented to person, place, and time. She appears well-developed and well-nourished.   HENT:   Head: Normocephalic and atraumatic.   Right Ear: Hearing, tympanic membrane and external ear normal.   Left Ear: Hearing, tympanic membrane and external ear normal.   Nose: Nose normal. No mucosal edema or nasal deformity. No epistaxis.   Mouth/Throat: Oropharynx is clear and moist. Normal dentition.   Eyes: Conjunctivae and lids are normal.   Fundoscopic exam:       The right eye shows no exudate and no hemorrhage.        The left eye shows no exudate and no hemorrhage.   Neck: Trachea normal and full passive range of motion without pain. No JVD present. Carotid bruit is not present. No thyromegaly present.   Cardiovascular: Normal rate, regular rhythm, S1 normal, S2 normal, normal heart sounds and normal pulses. Exam reveals no friction rub.   No murmur heard.  Pulmonary/Chest: Breath sounds normal. No respiratory distress. She has no wheezes. She has no rales.   Abdominal: Soft. Normal appearance, normal aorta and bowel sounds are normal. There is no tenderness. There is no rigidity, no rebound, no guarding and no CVA tenderness.   Genitourinary: Vagina normal and uterus normal. Rectal exam shows no external hemorrhoid, no internal hemorrhoid and guaiac negative stool. There is no rash on the right labia. There is no rash on the left labia. Uterus is not deviated, not enlarged and not tender. Cervix exhibits no motion tenderness and no discharge. Right adnexum displays no mass and no tenderness. Left adnexum displays no mass and no tenderness. No tenderness in the vagina. No vaginal discharge found.   Musculoskeletal:  Normal range of motion.   Lymphadenopathy:        Head (right side): No tonsillar adenopathy present.        Head (left side): No tonsillar adenopathy present.     She has no cervical adenopathy.     She has no axillary adenopathy.   Neurological: She is alert and oriented to person, place, and time. She has normal strength. No cranial nerve deficit or sensory deficit. She displays a negative Romberg sign.   Skin: No bruising and no rash noted. She is not diaphoretic. No cyanosis or erythema. No pallor. Nails show no clubbing.   Psychiatric: She has a normal mood and affect. Her behavior is normal. Judgment and thought content normal.       Results for orders placed or performed in visit on 06/19/20   POCT urinalysis dipstick, automated   Result Value Ref Range    Color Yellow Yellow, Straw, Dark Yellow, Tabby    Clarity, UA Clear Clear    Specific Gravity  1.015 1.005 - 1.030    pH, Urine 8.0 5.0 - 8.0    Leukocytes Negative Negative    Nitrite, UA Negative Negative    Protein, POC Negative Negative mg/dL    Glucose, UA Negative Negative, 1000 mg/dL (3+) mg/dL    Ketones, UA Negative Negative    Urobilinogen, UA Normal Normal    Bilirubin Negative Negative    Blood, UA Negative Negative       Subha was seen today for annual exam, gynecologic exam and hand pain.    Diagnoses and all orders for this visit:    Annual physical exam  -     CBC (No Diff); Future  -     Comprehensive Metabolic Panel; Future  -     Hepatitis C Antibody; Future  -     Lipid Panel; Future  -     TSH; Future  -     T4, Free; Future  -     Vitamin D 25 Hydroxy; Future  -     POCT urinalysis dipstick, automated  -     Pneumococcal Polysaccharide Vaccine 23-Valent (PPSV23) Greater Than or Equal To 3yo Subcutaneous / IM    Bilateral low back pain with sciatica, sciatica laterality unspecified, unspecified chronicity  -     cyclobenzaprine (Flexeril) 10 MG tablet; Take 1 tablet by mouth Daily.  -     Discontinue: HYDROcodone-acetaminophen (NORCO)   MG per tablet; Take 1 tablet by mouth Every 8 (Eight) Hours As Needed for Moderate Pain . Fill after 5/7/2020  -     HYDROcodone-acetaminophen (NORCO)  MG per tablet; Take 1 tablet by mouth Every 8 (Eight) Hours As Needed for Moderate Pain . Fill after 7/18/2020    Seasonal allergic rhinitis  -     fluticasone (FLONASE) 50 MCG/ACT nasal spray; 2 sprays by Each Nare route Daily.  -     loratadine (Claritin) 10 MG tablet; Take 1 tablet by mouth Daily.    Vitamin B12 deficiency  -     Cyanocobalamin (VITAMIN B-12) 5000 MCG sublingual tablet; Take 5,000 mcg by mouth Daily.    Family history of breast cancer  -     Ambulatory Referral to Genetic Counseling/Testing - [facility]    Primary osteoarthritis of both hands  Comments:  1st mcp joints  Orders:  -     Cancel: Ambulatory Referral to Hand Surgery  -     Ambulatory Referral to Hand Surgery          Health Maintenance   Topic Date Due   • INFLUENZA VACCINE  08/01/2020   • PAP SMEAR  04/17/2021   • ANNUAL PHYSICAL  06/20/2021   • TDAP/TD VACCINES (2 - Td) 09/09/2023   • PNEUMOCOCCAL VACCINE (19-64 MEDIUM RISK)  Completed   • HEPATITIS C SCREENING  Completed       Discussion/Summary  Impression: health maintenance visit. Currently, she eats an adequate diet and has an adequate exercise regimen.   Cervical cancer screening:Pap smear is current.   Breast cancer screening: mammogram is current.   Colorectal cancer screening: colonoscopy is due at 45.  Osteoporosis screening: Bone mineral density test is not indicated.   Screening lab work includes hemoglobin, glucose, lipid profile, thyroid function testing, 25-hydroxyvitamin D and urinalysis.   Immunizations are needed, immunizations will be given as outlined in the orders   Advice and education were given regarding cardiovascular risk reduction, healthy dietary habits, Seatbelt and helmet use and self skin examination.     Return in about 2 months (around 8/19/2020).

## 2020-06-20 LAB
ALBUMIN SERPL-MCNC: 4.8 G/DL (ref 3.5–5.2)
ALBUMIN/GLOB SERPL: 2.1 G/DL
ALP SERPL-CCNC: 47 U/L (ref 39–117)
ALT SERPL W P-5'-P-CCNC: 16 U/L (ref 1–33)
ANION GAP SERPL CALCULATED.3IONS-SCNC: 12.3 MMOL/L (ref 5–15)
AST SERPL-CCNC: 20 U/L (ref 1–32)
BILIRUB SERPL-MCNC: 0.4 MG/DL (ref 0.2–1.2)
BUN BLD-MCNC: 10 MG/DL (ref 6–20)
BUN/CREAT SERPL: 13.2 (ref 7–25)
CALCIUM SPEC-SCNC: 9.5 MG/DL (ref 8.6–10.5)
CHLORIDE SERPL-SCNC: 101 MMOL/L (ref 98–107)
CHOLEST SERPL-MCNC: 149 MG/DL (ref 0–200)
CO2 SERPL-SCNC: 22.7 MMOL/L (ref 22–29)
CREAT BLD-MCNC: 0.76 MG/DL (ref 0.57–1)
GFR SERPL CREATININE-BSD FRML MDRD: 84 ML/MIN/1.73
GLOBULIN UR ELPH-MCNC: 2.3 GM/DL
GLUCOSE BLD-MCNC: 100 MG/DL (ref 65–99)
HDLC SERPL-MCNC: 67 MG/DL (ref 40–60)
LDLC SERPL CALC-MCNC: 64 MG/DL (ref 0–100)
LDLC/HDLC SERPL: 0.95 {RATIO}
POTASSIUM BLD-SCNC: 4.2 MMOL/L (ref 3.5–5.2)
PROT SERPL-MCNC: 7.1 G/DL (ref 6–8.5)
SODIUM BLD-SCNC: 136 MMOL/L (ref 136–145)
T4 FREE SERPL-MCNC: 1.59 NG/DL (ref 0.93–1.7)
TRIGL SERPL-MCNC: 91 MG/DL (ref 0–150)
TSH SERPL DL<=0.05 MIU/L-ACNC: 0.86 UIU/ML (ref 0.27–4.2)
VLDLC SERPL-MCNC: 18.2 MG/DL (ref 5–40)

## 2020-06-23 ENCOUNTER — TELEPHONE (OUTPATIENT)
Dept: GENETICS | Facility: HOSPITAL | Age: 40
End: 2020-06-23

## 2020-06-24 DIAGNOSIS — Z00.00 ANNUAL PHYSICAL EXAM: ICD-10-CM

## 2020-06-24 DIAGNOSIS — M54.40 BILATERAL LOW BACK PAIN WITH SCIATICA, SCIATICA LATERALITY UNSPECIFIED, UNSPECIFIED CHRONICITY: ICD-10-CM

## 2020-06-26 RX ORDER — VARENICLINE TARTRATE 1 MG/1
TABLET, FILM COATED ORAL
Qty: 56 TABLET | Refills: 1 | Status: SHIPPED | OUTPATIENT
Start: 2020-06-26 | End: 2020-07-20

## 2020-07-20 RX ORDER — VARENICLINE TARTRATE 1 MG/1
TABLET, FILM COATED ORAL
Qty: 56 TABLET | Refills: 1 | Status: SHIPPED | OUTPATIENT
Start: 2020-07-20 | End: 2020-08-18

## 2020-07-20 NOTE — TELEPHONE ENCOUNTER
[facility] (Tele-ICU)     Admission Review Note      Reason for ICU Admission: lactic acidosis, abdominal wall cellulitis    Unplanned Post-Op ICU Admission: No     Code Status: Prior      Data Reviewed:   Recent Notes:yes   Laboratory Results:yes   Radiology Images/Reports:Yes                AV Assessment:Yes    EBBP Review:       SUP:   not indicated   VTEP: Combined device and drug therapy     Blood Glucose Monitoring:    DM:Type II      Blood Glucose:  Glucose (mg/dL)   Date Value   05/20/2020 261 (H)   09/05/2019 63 (L)      Treatment Ordered:Sliding scale     Ventilator Review:   Intubated: no     Communication with: None    Active Problems     62 yo with morbid obesity admitted with severe sepsis due to abdominal wall cellulitis, DM2   Refill sent to pharmacy per protocol.

## 2020-07-23 RX ORDER — LEVOTHYROXINE SODIUM 0.1 MG/1
TABLET ORAL
Qty: 30 TABLET | Refills: 4 | Status: SHIPPED | OUTPATIENT
Start: 2020-07-23 | End: 2020-10-20

## 2020-08-18 ENCOUNTER — OFFICE VISIT (OUTPATIENT)
Dept: INTERNAL MEDICINE | Facility: CLINIC | Age: 40
End: 2020-08-18

## 2020-08-18 VITALS
TEMPERATURE: 97.1 F | WEIGHT: 148.2 LBS | SYSTOLIC BLOOD PRESSURE: 118 MMHG | OXYGEN SATURATION: 98 % | DIASTOLIC BLOOD PRESSURE: 82 MMHG | HEIGHT: 65 IN | BODY MASS INDEX: 24.69 KG/M2 | HEART RATE: 65 BPM

## 2020-08-18 DIAGNOSIS — F41.9 ANXIETY: Primary | ICD-10-CM

## 2020-08-18 DIAGNOSIS — B35.1 ONYCHOMYCOSIS: ICD-10-CM

## 2020-08-18 DIAGNOSIS — M54.40 BILATERAL LOW BACK PAIN WITH SCIATICA, SCIATICA LATERALITY UNSPECIFIED, UNSPECIFIED CHRONICITY: ICD-10-CM

## 2020-08-18 PROCEDURE — 99213 OFFICE O/P EST LOW 20 MIN: CPT | Performed by: INTERNAL MEDICINE

## 2020-08-18 RX ORDER — VARENICLINE TARTRATE 1 MG/1
TABLET, FILM COATED ORAL
Qty: 56 TABLET | Refills: 1 | Status: SHIPPED | OUTPATIENT
Start: 2020-08-18 | End: 2020-09-19 | Stop reason: SDUPTHER

## 2020-08-18 RX ORDER — BUSPIRONE HYDROCHLORIDE 10 MG/1
10 TABLET ORAL 3 TIMES DAILY
Qty: 90 TABLET | Refills: 5 | Status: SHIPPED | OUTPATIENT
Start: 2020-08-18 | End: 2020-09-16 | Stop reason: SDUPTHER

## 2020-08-18 RX ORDER — HYDROCODONE BITARTRATE AND ACETAMINOPHEN 10; 325 MG/1; MG/1
1 TABLET ORAL EVERY 8 HOURS PRN
Qty: 90 TABLET | Refills: 0 | Status: SHIPPED | OUTPATIENT
Start: 2020-08-18 | End: 2020-08-18 | Stop reason: SDUPTHER

## 2020-08-18 RX ORDER — HYDROCODONE BITARTRATE AND ACETAMINOPHEN 10; 325 MG/1; MG/1
1 TABLET ORAL EVERY 8 HOURS PRN
Qty: 90 TABLET | Refills: 0 | Status: SHIPPED | OUTPATIENT
Start: 2020-08-18 | End: 2020-10-20 | Stop reason: SDUPTHER

## 2020-08-18 NOTE — PROGRESS NOTES
Back Pain and Med Refill    Subjective   Subha Patino is a 40 y.o. female is here today for follow-up.    History of Present Illness   Here for a f/u on her back pain.  Doing better on the chantix.  Left toe nail avulsed, ? fungus    Current Outpatient Medications:   •  busPIRone (BUSPAR) 10 MG tablet, Take 1 tablet by mouth 3 (Three) Times a Day., Disp: 90 tablet, Rfl: 5  •  Cyanocobalamin (VITAMIN B-12) 5000 MCG sublingual tablet, Take 5,000 mcg by mouth Daily., Disp: 90 tablet, Rfl: 1  •  cyclobenzaprine (Flexeril) 10 MG tablet, Take 1 tablet by mouth Daily., Disp: 30 tablet, Rfl: 4  •  fluticasone (FLONASE) 50 MCG/ACT nasal spray, 2 sprays by Each Nare route Daily., Disp: 1 bottle, Rfl: 5  •  HYDROcodone-acetaminophen (NORCO)  MG per tablet, Take 1 tablet by mouth Every 8 (Eight) Hours As Needed for Moderate Pain . Fill after 9/17/2020, Disp: 90 tablet, Rfl: 0  •  levothyroxine (SYNTHROID, LEVOTHROID) 100 MCG tablet, TAKE 1 TABLET BY MOUTH EVERY DAY, Disp: 30 tablet, Rfl: 4  •  loratadine (Claritin) 10 MG tablet, Take 1 tablet by mouth Daily., Disp: 90 tablet, Rfl: 1  •  spironolactone (ALDACTONE) 50 MG tablet, Take 2 tablets by mouth Daily., Disp: 60 tablet, Rfl: 11  •  CHANTIX CONTINUING MONTH TONA 1 MG tablet, TAKE 1 TABLET BY MOUTH TWICE A DAY, Disp: 56 tablet, Rfl: 1  •  ciclopirox (PENLAC) 8 % solution, Apply  topically to the appropriate area as directed Every Night., Disp: 6 mL, Rfl: 3  •  nicotine (Nicoderm CQ) 7 MG/24HR patch, Place 1 patch on the skin as directed by provider Daily., Disp: 30 each, Rfl: 0      The following portions of the patient's history were reviewed and updated as appropriate: allergies, current medications, past family history, past medical history, past social history, past surgical history and problem list.    Review of Systems   Constitutional: Negative.  Negative for chills and fever.   HENT: Negative for ear discharge, ear pain, sinus pressure and sore throat.   "  Respiratory: Negative for cough, chest tightness and shortness of breath.    Cardiovascular: Negative for chest pain, palpitations and leg swelling.   Gastrointestinal: Negative for diarrhea, nausea and vomiting.   Musculoskeletal: Positive for back pain. Negative for arthralgias and myalgias.   Skin: Positive for color change (nail).   Neurological: Negative for dizziness, syncope and headaches.   Psychiatric/Behavioral: Negative for confusion and sleep disturbance.       Objective   /82   Pulse 65   Temp 97.1 °F (36.2 °C)   Ht 165.1 cm (65\")   Wt 67.2 kg (148 lb 3.2 oz)   SpO2 98% Comment: ra  BMI 24.66 kg/m²   Physical Exam   Constitutional: She is oriented to person, place, and time. She appears well-developed and well-nourished.   HENT:   Head: Normocephalic and atraumatic.   Mouth/Throat: No oropharyngeal exudate.   Eyes: Pupils are equal, round, and reactive to light. Conjunctivae are normal.   Neck: Neck supple. No thyromegaly present.   Cardiovascular: Normal rate and regular rhythm.   Pulmonary/Chest: Effort normal and breath sounds normal.   Abdominal: Soft. Bowel sounds are normal. She exhibits no distension. There is no tenderness.   Musculoskeletal: She exhibits tenderness. She exhibits no edema.   Neurological: She is alert and oriented to person, place, and time. No cranial nerve deficit.   Skin: Skin is warm and dry.   Psychiatric: She has a normal mood and affect. Judgment normal.   Nursing note and vitals reviewed.        Results for orders placed or performed in visit on 06/19/20   CBC (No Diff)   Result Value Ref Range    WBC 9.71 3.40 - 10.80 10*3/mm3    RBC 4.66 3.77 - 5.28 10*6/mm3    Hemoglobin 15.4 12.0 - 15.9 g/dL    Hematocrit 45.2 34.0 - 46.6 %    MCV 97.0 79.0 - 97.0 fL    MCH 33.0 26.6 - 33.0 pg    MCHC 34.1 31.5 - 35.7 g/dL    RDW 11.6 (L) 12.3 - 15.4 %    RDW-SD 41.8 37.0 - 54.0 fl    MPV 11.8 6.0 - 12.0 fL    Platelets 257 140 - 450 10*3/mm3   Comprehensive Metabolic " Panel   Result Value Ref Range    Glucose 100 (H) 65 - 99 mg/dL    BUN 10 6 - 20 mg/dL    Creatinine 0.76 0.57 - 1.00 mg/dL    Sodium 136 136 - 145 mmol/L    Potassium 4.2 3.5 - 5.2 mmol/L    Chloride 101 98 - 107 mmol/L    CO2 22.7 22.0 - 29.0 mmol/L    Calcium 9.5 8.6 - 10.5 mg/dL    Total Protein 7.1 6.0 - 8.5 g/dL    Albumin 4.80 3.50 - 5.20 g/dL    ALT (SGPT) 16 1 - 33 U/L    AST (SGOT) 20 1 - 32 U/L    Alkaline Phosphatase 47 39 - 117 U/L    Total Bilirubin 0.4 0.2 - 1.2 mg/dL    eGFR Non African Amer 84 >60 mL/min/1.73    Globulin 2.3 gm/dL    A/G Ratio 2.1 g/dL    BUN/Creatinine Ratio 13.2 7.0 - 25.0    Anion Gap 12.3 5.0 - 15.0 mmol/L   Hepatitis C Antibody   Result Value Ref Range    Hepatitis C Ab Non-Reactive Non-Reactive   Lipid Panel   Result Value Ref Range    Total Cholesterol 149 0 - 200 mg/dL    Triglycerides 91 0 - 150 mg/dL    HDL Cholesterol 67 (H) 40 - 60 mg/dL    LDL Cholesterol  64 0 - 100 mg/dL    VLDL Cholesterol 18.2 5 - 40 mg/dL    LDL/HDL Ratio 0.95    TSH   Result Value Ref Range    TSH 0.859 0.270 - 4.200 uIU/mL   T4, Free   Result Value Ref Range    Free T4 1.59 0.93 - 1.70 ng/dL   Vitamin D 25 Hydroxy   Result Value Ref Range    25 Hydroxy, Vitamin D 56.4 30.0 - 100.0 ng/ml             Assessment/Plan   Diagnoses and all orders for this visit:    Anxiety  -     busPIRone (BUSPAR) 10 MG tablet; Take 1 tablet by mouth 3 (Three) Times a Day.    Bilateral low back pain with sciatica, sciatica laterality unspecified, unspecified chronicity  -     Discontinue: HYDROcodone-acetaminophen (NORCO)  MG per tablet; Take 1 tablet by mouth Every 8 (Eight) Hours As Needed for Moderate Pain .  -     HYDROcodone-acetaminophen (NORCO)  MG per tablet; Take 1 tablet by mouth Every 8 (Eight) Hours As Needed for Moderate Pain . Fill after 9/17/2020    Onychomycosis  -     ciclopirox (PENLAC) 8 % solution; Apply  topically to the appropriate area as directed Every Night.    Other orders  -      nicotine (Nicoderm CQ) 7 MG/24HR patch; Place 1 patch on the skin as directed by provider Daily.    The patient has read and signed the [facility] Controlled Substance Contract.  I will continue to see patient for regular follow up appointments.  They are well controlled on their medication.  MARJORIE has been reviewed by me and is updated every 3 months. The patient is aware of the potential for addiction and dependence.               Return in about 2 months (around 10/18/2020) for Next scheduled follow up.

## 2020-08-31 ENCOUNTER — HOSPITAL ENCOUNTER (OUTPATIENT)
Dept: MAMMOGRAPHY | Facility: HOSPITAL | Age: 40
Discharge: HOME | End: 2020-08-31
Admitting: INTERNAL MEDICINE

## 2020-08-31 DIAGNOSIS — Z12.31 VISIT FOR SCREENING MAMMOGRAM: ICD-10-CM

## 2020-08-31 PROCEDURE — 77067 SCR MAMMO BI INCL CAD: CPT | Performed by: RADIOLOGY

## 2020-08-31 PROCEDURE — 77063 BREAST TOMOSYNTHESIS BI: CPT

## 2020-08-31 PROCEDURE — 77063 BREAST TOMOSYNTHESIS BI: CPT | Performed by: RADIOLOGY

## 2020-08-31 PROCEDURE — 77067 SCR MAMMO BI INCL CAD: CPT

## 2020-09-04 ENCOUNTER — HOSPITAL ENCOUNTER (OUTPATIENT)
Dept: MAMMOGRAPHY | Facility: HOSPITAL | Age: 40
Discharge: HOME | End: 2020-09-04
Admitting: RADIOLOGY

## 2020-09-04 DIAGNOSIS — R92.8 ABNORMAL MAMMOGRAM: ICD-10-CM

## 2020-09-04 PROCEDURE — 77066 DX MAMMO INCL CAD BI: CPT

## 2020-09-04 PROCEDURE — 77066 DX MAMMO INCL CAD BI: CPT | Performed by: RADIOLOGY

## 2020-09-04 PROCEDURE — G0279 TOMOSYNTHESIS, MAMMO: HCPCS

## 2020-09-04 PROCEDURE — 77062 BREAST TOMOSYNTHESIS BI: CPT | Performed by: RADIOLOGY

## 2020-09-09 RX ORDER — VARENICLINE TARTRATE 1 MG/1
TABLET, FILM COATED ORAL
Qty: 56 TABLET | Refills: 1 | OUTPATIENT
Start: 2020-09-09

## 2020-09-16 DIAGNOSIS — F41.9 ANXIETY: ICD-10-CM

## 2020-09-16 RX ORDER — BUSPIRONE HYDROCHLORIDE 10 MG/1
10 TABLET ORAL 3 TIMES DAILY
Qty: 270 TABLET | Refills: 1 | Status: SHIPPED | OUTPATIENT
Start: 2020-09-16 | End: 2021-02-23 | Stop reason: SDUPTHER

## 2020-09-19 RX ORDER — VARENICLINE TARTRATE 1 MG/1
1 TABLET, FILM COATED ORAL 2 TIMES DAILY
Qty: 56 TABLET | Refills: 1 | Status: SHIPPED | OUTPATIENT
Start: 2020-09-19 | End: 2020-10-12

## 2020-10-12 RX ORDER — VARENICLINE TARTRATE 1 MG/1
TABLET, FILM COATED ORAL
Qty: 56 TABLET | Refills: 1 | Status: SHIPPED | OUTPATIENT
Start: 2020-10-12 | End: 2020-11-09

## 2020-10-20 ENCOUNTER — OFFICE VISIT (OUTPATIENT)
Dept: INTERNAL MEDICINE | Facility: CLINIC | Age: 40
End: 2020-10-20

## 2020-10-20 VITALS
SYSTOLIC BLOOD PRESSURE: 118 MMHG | DIASTOLIC BLOOD PRESSURE: 74 MMHG | WEIGHT: 149 LBS | BODY MASS INDEX: 24.79 KG/M2 | HEART RATE: 68 BPM | OXYGEN SATURATION: 98 %

## 2020-10-20 DIAGNOSIS — Z91.89 AT HIGH RISK FOR BREAST CANCER: ICD-10-CM

## 2020-10-20 DIAGNOSIS — B35.1 ONYCHOMYCOSIS: ICD-10-CM

## 2020-10-20 DIAGNOSIS — M54.40 BILATERAL LOW BACK PAIN WITH SCIATICA, SCIATICA LATERALITY UNSPECIFIED, UNSPECIFIED CHRONICITY: Primary | ICD-10-CM

## 2020-10-20 PROCEDURE — 99214 OFFICE O/P EST MOD 30 MIN: CPT | Performed by: INTERNAL MEDICINE

## 2020-10-20 RX ORDER — LEVOTHYROXINE SODIUM 0.1 MG/1
TABLET ORAL
Qty: 90 TABLET | Refills: 1 | Status: SHIPPED | OUTPATIENT
Start: 2020-10-20 | End: 2021-01-20 | Stop reason: SDUPTHER

## 2020-10-20 RX ORDER — AMOXICILLIN 500 MG/1
TABLET, FILM COATED ORAL
COMMUNITY
Start: 2020-07-20 | End: 2020-12-20

## 2020-10-20 RX ORDER — HYDROCODONE BITARTRATE AND ACETAMINOPHEN 10; 325 MG/1; MG/1
1 TABLET ORAL EVERY 8 HOURS PRN
Qty: 90 TABLET | Refills: 0 | Status: SHIPPED | OUTPATIENT
Start: 2020-10-20 | End: 2020-10-20 | Stop reason: SDUPTHER

## 2020-10-20 RX ORDER — HYDROCODONE BITARTRATE AND ACETAMINOPHEN 10; 325 MG/1; MG/1
1 TABLET ORAL EVERY 8 HOURS PRN
Qty: 90 TABLET | Refills: 0 | Status: SHIPPED | OUTPATIENT
Start: 2020-10-20 | End: 2020-12-15 | Stop reason: SDUPTHER

## 2020-10-20 NOTE — PROGRESS NOTES
Follow-up and Anxiety    Subjective   Subha Patino is a 40 y.o. female is here today for follow-up.    History of Present Illness   Subha reports that her sister was dxed with high grade breast ca, and she is s/p Mamm.  Cutting back on the cigs- down to 1 last.        Current Outpatient Medications:   •  amoxicillin (AMOXIL) 500 MG tablet, TAKE 1 TABLET BY MOUTH THREE TIMES A DAY UNTIL FINISHED, Disp: , Rfl:   •  busPIRone (BUSPAR) 10 MG tablet, Take 1 tablet by mouth 3 (Three) Times a Day., Disp: 270 tablet, Rfl: 1  •  Chantix Continuing Month Jorge 1 MG tablet, TAKE 1 TABLET BY MOUTH TWICE A DAY, Disp: 56 tablet, Rfl: 1  •  ciclopirox (PENLAC) 8 % solution, Apply  topically to the appropriate area as directed Every Night., Disp: 6 mL, Rfl: 3  •  Cyanocobalamin (VITAMIN B-12) 5000 MCG sublingual tablet, Take 5,000 mcg by mouth Daily., Disp: 90 tablet, Rfl: 1  •  cyclobenzaprine (Flexeril) 10 MG tablet, Take 1 tablet by mouth Daily., Disp: 30 tablet, Rfl: 4  •  fluticasone (FLONASE) 50 MCG/ACT nasal spray, 2 sprays by Each Nare route Daily., Disp: 1 bottle, Rfl: 5  •  HYDROcodone-acetaminophen (NORCO)  MG per tablet, Take 1 tablet by mouth Every 8 (Eight) Hours As Needed for Moderate Pain . Fill after 11/19/2020, Disp: 90 tablet, Rfl: 0  •  levothyroxine (SYNTHROID, LEVOTHROID) 100 MCG tablet, TAKE 1 TABLET BY MOUTH EVERY DAY, Disp: 30 tablet, Rfl: 4  •  loratadine (Claritin) 10 MG tablet, Take 1 tablet by mouth Daily., Disp: 90 tablet, Rfl: 1  •  nicotine (Nicoderm CQ) 7 MG/24HR patch, Place 1 patch on the skin as directed by provider Daily., Disp: 30 each, Rfl: 0  •  spironolactone (ALDACTONE) 50 MG tablet, Take 2 tablets by mouth Daily., Disp: 60 tablet, Rfl: 11      The following portions of the patient's history were reviewed and updated as appropriate: allergies, current medications, past family history, past medical history, past social history, past surgical history and problem list.    Review of  Systems   Constitutional: Negative.  Negative for chills and fever.   HENT: Negative for ear discharge, ear pain, sinus pressure and sore throat.    Respiratory: Negative for cough, chest tightness and shortness of breath.    Cardiovascular: Negative for chest pain, palpitations and leg swelling.   Gastrointestinal: Negative for diarrhea, nausea and vomiting.   Musculoskeletal: Positive for arthralgias, back pain and myalgias.   Neurological: Negative for dizziness, syncope and headaches.   Psychiatric/Behavioral: Negative for confusion and sleep disturbance.       Objective   /74   Pulse 68   Wt 67.6 kg (149 lb)   SpO2 98%   Breastfeeding No   BMI 24.79 kg/m²   Physical Exam  Vitals signs and nursing note reviewed.   Constitutional:       Appearance: She is well-developed.   HENT:      Head: Normocephalic and atraumatic.      Right Ear: External ear normal.      Left Ear: External ear normal.      Mouth/Throat:      Pharynx: No oropharyngeal exudate.   Eyes:      Conjunctiva/sclera: Conjunctivae normal.      Pupils: Pupils are equal, round, and reactive to light.   Neck:      Musculoskeletal: Neck supple.      Thyroid: No thyromegaly.   Cardiovascular:      Rate and Rhythm: Normal rate and regular rhythm.   Pulmonary:      Effort: Pulmonary effort is normal.      Breath sounds: Normal breath sounds.   Abdominal:      General: Bowel sounds are normal. There is no distension.      Palpations: Abdomen is soft.      Tenderness: There is no abdominal tenderness.   Musculoskeletal:         General: Tenderness present. No deformity.   Skin:     General: Skin is warm and dry.   Neurological:      Mental Status: She is alert and oriented to person, place, and time.      Cranial Nerves: No cranial nerve deficit.   Psychiatric:         Judgment: Judgment normal.           Results for orders placed or performed in visit on 06/19/20   CBC (No Diff)    Specimen: Blood   Result Value Ref Range    WBC 9.71 3.40 - 10.80  10*3/mm3    RBC 4.66 3.77 - 5.28 10*6/mm3    Hemoglobin 15.4 12.0 - 15.9 g/dL    Hematocrit 45.2 34.0 - 46.6 %    MCV 97.0 79.0 - 97.0 fL    MCH 33.0 26.6 - 33.0 pg    MCHC 34.1 31.5 - 35.7 g/dL    RDW 11.6 (L) 12.3 - 15.4 %    RDW-SD 41.8 37.0 - 54.0 fl    MPV 11.8 6.0 - 12.0 fL    Platelets 257 140 - 450 10*3/mm3   Comprehensive Metabolic Panel    Specimen: Blood   Result Value Ref Range    Glucose 100 (H) 65 - 99 mg/dL    BUN 10 6 - 20 mg/dL    Creatinine 0.76 0.57 - 1.00 mg/dL    Sodium 136 136 - 145 mmol/L    Potassium 4.2 3.5 - 5.2 mmol/L    Chloride 101 98 - 107 mmol/L    CO2 22.7 22.0 - 29.0 mmol/L    Calcium 9.5 8.6 - 10.5 mg/dL    Total Protein 7.1 6.0 - 8.5 g/dL    Albumin 4.80 3.50 - 5.20 g/dL    ALT (SGPT) 16 1 - 33 U/L    AST (SGOT) 20 1 - 32 U/L    Alkaline Phosphatase 47 39 - 117 U/L    Total Bilirubin 0.4 0.2 - 1.2 mg/dL    eGFR Non African Amer 84 >60 mL/min/1.73    Globulin 2.3 gm/dL    A/G Ratio 2.1 g/dL    BUN/Creatinine Ratio 13.2 7.0 - 25.0    Anion Gap 12.3 5.0 - 15.0 mmol/L   Hepatitis C Antibody    Specimen: Blood   Result Value Ref Range    Hepatitis C Ab Non-Reactive Non-Reactive   Lipid Panel    Specimen: Blood   Result Value Ref Range    Total Cholesterol 149 0 - 200 mg/dL    Triglycerides 91 0 - 150 mg/dL    HDL Cholesterol 67 (H) 40 - 60 mg/dL    LDL Cholesterol  64 0 - 100 mg/dL    VLDL Cholesterol 18.2 5 - 40 mg/dL    LDL/HDL Ratio 0.95    TSH    Specimen: Blood   Result Value Ref Range    TSH 0.859 0.270 - 4.200 uIU/mL   T4, Free    Specimen: Blood   Result Value Ref Range    Free T4 1.59 0.93 - 1.70 ng/dL   Vitamin D 25 Hydroxy    Specimen: Blood   Result Value Ref Range    25 Hydroxy, Vitamin D 56.4 30.0 - 100.0 ng/ml             Assessment/Plan   Diagnoses and all orders for this visit:    Bilateral low back pain with sciatica, sciatica laterality unspecified, unspecified chronicity  -     Discontinue: HYDROcodone-acetaminophen (NORCO)  MG per tablet; Take 1 tablet by  mouth Every 8 (Eight) Hours As Needed for Moderate Pain .  -     HYDROcodone-acetaminophen (NORCO)  MG per tablet; Take 1 tablet by mouth Every 8 (Eight) Hours As Needed for Moderate Pain . Fill after 11/19/2020    At high risk for breast cancer  Comments:  plan for MRI in march    Onychomycosis  -     ciclopirox (PENLAC) 8 % solution; Apply  topically to the appropriate area as directed Every Night.    Other orders  -     amoxicillin (AMOXIL) 500 MG tablet; TAKE 1 TABLET BY MOUTH THREE TIMES A DAY UNTIL FINISHED           The patient has read and signed the [facility] Controlled Substance Contract.  I will continue to see patient for regular follow up appointments.  They are well controlled on their medication.  MARJORIE has been reviewed by me and is updated every 3 months. The patient is aware of the potential for addiction and dependence.        Return in about 2 months (around 12/20/2020) for Recheck, for december and feb.

## 2020-10-21 ENCOUNTER — TELEPHONE (OUTPATIENT)
Dept: INTERNAL MEDICINE | Facility: CLINIC | Age: 40
End: 2020-10-21

## 2020-10-21 NOTE — TELEPHONE ENCOUNTER
PATIENT CALLED IN STATED SHE JUST WANTED TO ADVISE PCP THAT DUE TO HER HAVING 2 TEETH PULLED THE DENTIST GAVE HER 8 OXYCODONE FOR BREAK THRU PAIN SHE JUST WANTED TO MAKE PCP AWARE PLEASE ADVISE      CB NUMBER: [phone]

## 2020-10-22 ENCOUNTER — TELEPHONE (OUTPATIENT)
Dept: INTERNAL MEDICINE | Facility: CLINIC | Age: 40
End: 2020-10-22

## 2020-10-22 NOTE — TELEPHONE ENCOUNTER
PATIENT CALLED IN STATED SHE JUST WANTED TO ADVISE PCP THAT DUE TO HER HAVING 2 TEETH PULLED THE DENTIST GAVE HER 8 OXYCODONE FOR BREAK THRU PAIN SHE JUST WANTED TO MAKE PCP AWARE PLEASE ADVISE. Pt want's to know if this would be ok to fill the script from the dentist? Please advise        CB NUMBER: [phone]

## 2020-10-22 NOTE — TELEPHONE ENCOUNTER
Patient wants to see if Dr. Todd would okay for her to fill her Oxycodone 5 mg.  She is getting the medicine from the dentist, she just has to have the okay from Dr. Todd, since she takes Hydrocodone for her back.  She is in a lot of pain and wondered if she could get this okay today.  She can be reached at [phone]

## 2020-10-23 NOTE — TELEPHONE ENCOUNTER
Sorry, yes, its okay.   I just need to be notified She doesn't need my okay as long as she notifies.    thanks

## 2020-11-09 RX ORDER — VARENICLINE TARTRATE 1 MG/1
TABLET, FILM COATED ORAL
Qty: 56 TABLET | Refills: 1 | Status: SHIPPED | OUTPATIENT
Start: 2020-11-09 | End: 2020-12-01

## 2020-12-01 RX ORDER — VARENICLINE TARTRATE 1 MG/1
TABLET, FILM COATED ORAL
Qty: 56 TABLET | Refills: 1 | Status: SHIPPED | OUTPATIENT
Start: 2020-12-01 | End: 2021-02-23

## 2020-12-01 NOTE — TELEPHONE ENCOUNTER
Last Office Visit: 8/18/20  Next Office Visit: 12/15/20    Labs completed in past 6 months? no  Labs completed in past year? yes    Last Refill Date: 11/9/20  Quantity:56  Refills:1    Pharmacy:

## 2020-12-15 ENCOUNTER — OFFICE VISIT (OUTPATIENT)
Dept: INTERNAL MEDICINE | Facility: CLINIC | Age: 40
End: 2020-12-15

## 2020-12-15 VITALS
SYSTOLIC BLOOD PRESSURE: 110 MMHG | OXYGEN SATURATION: 99 % | WEIGHT: 153.4 LBS | TEMPERATURE: 97 F | HEART RATE: 65 BPM | BODY MASS INDEX: 25.56 KG/M2 | HEIGHT: 65 IN | DIASTOLIC BLOOD PRESSURE: 74 MMHG

## 2020-12-15 DIAGNOSIS — R23.2 HOT FLASHES: Primary | ICD-10-CM

## 2020-12-15 DIAGNOSIS — E53.8 VITAMIN B12 DEFICIENCY: ICD-10-CM

## 2020-12-15 DIAGNOSIS — M54.40 BILATERAL LOW BACK PAIN WITH SCIATICA, SCIATICA LATERALITY UNSPECIFIED, UNSPECIFIED CHRONICITY: ICD-10-CM

## 2020-12-15 DIAGNOSIS — L70.9 ACNE, UNSPECIFIED ACNE TYPE: ICD-10-CM

## 2020-12-15 PROCEDURE — 99214 OFFICE O/P EST MOD 30 MIN: CPT | Performed by: INTERNAL MEDICINE

## 2020-12-15 RX ORDER — VENLAFAXINE HYDROCHLORIDE 37.5 MG/1
37.5 CAPSULE, EXTENDED RELEASE ORAL DAILY
Qty: 7 CAPSULE | Refills: 0 | Status: SHIPPED | OUTPATIENT
Start: 2020-12-15 | End: 2021-02-23

## 2020-12-15 RX ORDER — SPIRONOLACTONE 50 MG/1
100 TABLET, FILM COATED ORAL DAILY
Qty: 60 TABLET | Refills: 11 | Status: SHIPPED | OUTPATIENT
Start: 2020-12-15 | End: 2021-11-18

## 2020-12-15 RX ORDER — VENLAFAXINE HYDROCHLORIDE 75 MG/1
75 CAPSULE, EXTENDED RELEASE ORAL DAILY
Qty: 30 CAPSULE | Refills: 3 | Status: SHIPPED | OUTPATIENT
Start: 2020-12-15 | End: 2021-01-20 | Stop reason: SDUPTHER

## 2020-12-15 RX ORDER — CYCLOBENZAPRINE HCL 10 MG
10 TABLET ORAL DAILY
Qty: 30 TABLET | Refills: 4 | Status: SHIPPED | OUTPATIENT
Start: 2020-12-15 | End: 2021-04-23 | Stop reason: SDUPTHER

## 2020-12-15 RX ORDER — HYDROCODONE BITARTRATE AND ACETAMINOPHEN 10; 325 MG/1; MG/1
1 TABLET ORAL EVERY 8 HOURS PRN
Qty: 90 TABLET | Refills: 0 | Status: SHIPPED | OUTPATIENT
Start: 2020-12-15 | End: 2021-01-18 | Stop reason: SDUPTHER

## 2020-12-15 NOTE — PROGRESS NOTES
Back Pain, Menstrual Problem (irregular), and Anxiety    Subjective   Subha Patino is a 40 y.o. female is here today for follow-up.    History of Present Illness   More fatigue, night sweats and hot flashes. Mood swings.  Has trouble with her anxiety as well.  Quit smoking 2 mos ago. sister has breast cancer. SO not a candidate for hrt.        Current Outpatient Medications:   •  busPIRone (BUSPAR) 10 MG tablet, Take 1 tablet by mouth 3 (Three) Times a Day., Disp: 270 tablet, Rfl: 1  •  Chantix 1 MG tablet, TAKE 1 TABLET BY MOUTH TWICE A DAY, Disp: 56 tablet, Rfl: 1  •  ciclopirox (PENLAC) 8 % solution, Apply  topically to the appropriate area as directed Every Night., Disp: 6 mL, Rfl: 3  •  Cyanocobalamin (Vitamin B-12) 5000 MCG sublingual tablet, Take 5,000 mcg by mouth Daily., Disp: 90 tablet, Rfl: 1  •  cyclobenzaprine (Flexeril) 10 MG tablet, Take 1 tablet by mouth Daily., Disp: 30 tablet, Rfl: 4  •  fluticasone (FLONASE) 50 MCG/ACT nasal spray, 2 sprays by Each Nare route Daily., Disp: 1 bottle, Rfl: 5  •  HYDROcodone-acetaminophen (NORCO)  MG per tablet, Take 1 tablet by mouth Every 8 (Eight) Hours As Needed for Moderate Pain . Fill after 12/18/2020, Disp: 90 tablet, Rfl: 0  •  levothyroxine (SYNTHROID, LEVOTHROID) 100 MCG tablet, TAKE 1 TABLET BY MOUTH EVERY DAY, Disp: 90 tablet, Rfl: 1  •  loratadine (Claritin) 10 MG tablet, Take 1 tablet by mouth Daily., Disp: 90 tablet, Rfl: 1  •  spironolactone (ALDACTONE) 50 MG tablet, Take 2 tablets by mouth Daily., Disp: 60 tablet, Rfl: 11  •  amoxicillin (AMOXIL) 500 MG tablet, TAKE 1 TABLET BY MOUTH THREE TIMES A DAY UNTIL FINISHED, Disp: , Rfl:   •  nicotine (Nicoderm CQ) 7 MG/24HR patch, Place 1 patch on the skin as directed by provider Daily., Disp: 30 each, Rfl: 0  •  venlafaxine XR (Effexor XR) 37.5 MG 24 hr capsule, Take 1 capsule by mouth Daily for 7 days., Disp: 7 capsule, Rfl: 0  •  venlafaxine XR (Effexor XR) 75 MG 24 hr capsule, Take 1 capsule by  "mouth Daily. Start after 37.5mg dose, Disp: 30 capsule, Rfl: 3      The following portions of the patient's history were reviewed and updated as appropriate: allergies, current medications, past family history, past medical history, past social history, past surgical history and problem list.    Review of Systems   Constitutional: Negative.  Negative for chills and fever.   HENT: Negative for ear discharge, ear pain, sinus pressure and sore throat.    Respiratory: Negative for cough, chest tightness and shortness of breath.    Cardiovascular: Negative for chest pain, palpitations and leg swelling.   Gastrointestinal: Negative for diarrhea, nausea and vomiting.   Musculoskeletal: Positive for arthralgias, back pain and myalgias.   Neurological: Negative for dizziness, syncope and headaches.   Psychiatric/Behavioral: Negative for confusion and sleep disturbance.       Objective   /74   Pulse 65   Temp 97 °F (36.1 °C)   Ht 165.1 cm (65\")   Wt 69.6 kg (153 lb 6.4 oz)   SpO2 99% Comment: ra  BMI 25.53 kg/m²   Physical Exam  Vitals signs and nursing note reviewed.   Constitutional:       Appearance: She is well-developed.   HENT:      Head: Normocephalic and atraumatic.      Right Ear: External ear normal.      Left Ear: External ear normal.      Mouth/Throat:      Pharynx: No oropharyngeal exudate.   Eyes:      Conjunctiva/sclera: Conjunctivae normal.      Pupils: Pupils are equal, round, and reactive to light.   Neck:      Musculoskeletal: Neck supple.      Thyroid: No thyromegaly.   Cardiovascular:      Rate and Rhythm: Normal rate and regular rhythm.   Pulmonary:      Effort: Pulmonary effort is normal.      Breath sounds: Normal breath sounds.   Abdominal:      General: Bowel sounds are normal. There is no distension.      Palpations: Abdomen is soft.      Tenderness: There is no abdominal tenderness.   Musculoskeletal:         General: Tenderness present. No deformity.   Skin:     General: Skin is warm " and dry.   Neurological:      Mental Status: She is alert and oriented to person, place, and time.      Cranial Nerves: No cranial nerve deficit.   Psychiatric:         Judgment: Judgment normal.           Results for orders placed or performed in visit on 06/19/20   CBC (No Diff)    Specimen: Blood   Result Value Ref Range    WBC 9.71 3.40 - 10.80 10*3/mm3    RBC 4.66 3.77 - 5.28 10*6/mm3    Hemoglobin 15.4 12.0 - 15.9 g/dL    Hematocrit 45.2 34.0 - 46.6 %    MCV 97.0 79.0 - 97.0 fL    MCH 33.0 26.6 - 33.0 pg    MCHC 34.1 31.5 - 35.7 g/dL    RDW 11.6 (L) 12.3 - 15.4 %    RDW-SD 41.8 37.0 - 54.0 fl    MPV 11.8 6.0 - 12.0 fL    Platelets 257 140 - 450 10*3/mm3   Comprehensive Metabolic Panel    Specimen: Blood   Result Value Ref Range    Glucose 100 (H) 65 - 99 mg/dL    BUN 10 6 - 20 mg/dL    Creatinine 0.76 0.57 - 1.00 mg/dL    Sodium 136 136 - 145 mmol/L    Potassium 4.2 3.5 - 5.2 mmol/L    Chloride 101 98 - 107 mmol/L    CO2 22.7 22.0 - 29.0 mmol/L    Calcium 9.5 8.6 - 10.5 mg/dL    Total Protein 7.1 6.0 - 8.5 g/dL    Albumin 4.80 3.50 - 5.20 g/dL    ALT (SGPT) 16 1 - 33 U/L    AST (SGOT) 20 1 - 32 U/L    Alkaline Phosphatase 47 39 - 117 U/L    Total Bilirubin 0.4 0.2 - 1.2 mg/dL    eGFR Non African Amer 84 >60 mL/min/1.73    Globulin 2.3 gm/dL    A/G Ratio 2.1 g/dL    BUN/Creatinine Ratio 13.2 7.0 - 25.0    Anion Gap 12.3 5.0 - 15.0 mmol/L   Hepatitis C Antibody    Specimen: Blood   Result Value Ref Range    Hepatitis C Ab Non-Reactive Non-Reactive   Lipid Panel    Specimen: Blood   Result Value Ref Range    Total Cholesterol 149 0 - 200 mg/dL    Triglycerides 91 0 - 150 mg/dL    HDL Cholesterol 67 (H) 40 - 60 mg/dL    LDL Cholesterol  64 0 - 100 mg/dL    VLDL Cholesterol 18.2 5 - 40 mg/dL    LDL/HDL Ratio 0.95    TSH    Specimen: Blood   Result Value Ref Range    TSH 0.859 0.270 - 4.200 uIU/mL   T4, Free    Specimen: Blood   Result Value Ref Range    Free T4 1.59 0.93 - 1.70 ng/dL   Vitamin D 25 Hydroxy     Specimen: Blood   Result Value Ref Range    25 Hydroxy, Vitamin D 56.4 30.0 - 100.0 ng/ml             Assessment/Plan   Diagnoses and all orders for this visit:    Hot flashes  -     Follicle Stimulating Hormone  -     Luteinizing Hormone  -     Estradiol  -     venlafaxine XR (Effexor XR) 37.5 MG 24 hr capsule; Take 1 capsule by mouth Daily for 7 days.  -     venlafaxine XR (Effexor XR) 75 MG 24 hr capsule; Take 1 capsule by mouth Daily. Start after 37.5mg dose    Acne, unspecified acne type  -     spironolactone (ALDACTONE) 50 MG tablet; Take 2 tablets by mouth Daily.    Bilateral low back pain with sciatica, sciatica laterality unspecified, unspecified chronicity  -     HYDROcodone-acetaminophen (NORCO)  MG per tablet; Take 1 tablet by mouth Every 8 (Eight) Hours As Needed for Moderate Pain . Fill after 12/18/2020  -     cyclobenzaprine (Flexeril) 10 MG tablet; Take 1 tablet by mouth Daily.    Vitamin B12 deficiency  -     Cyanocobalamin (Vitamin B-12) 5000 MCG sublingual tablet; Take 5,000 mcg by mouth Daily.      Take buspar prn or can stop it once on the effexor.    Call for next month's dose on the hc/apa.           Return in about 2 months (around 2/15/2021) for Next scheduled follow up.

## 2021-01-15 ENCOUNTER — LAB (OUTPATIENT)
Dept: LAB | Facility: HOSPITAL | Age: 41
End: 2021-01-15

## 2021-01-15 ENCOUNTER — OFFICE VISIT (OUTPATIENT)
Dept: ENDOCRINOLOGY | Facility: CLINIC | Age: 41
End: 2021-01-15

## 2021-01-15 VITALS
BODY MASS INDEX: 26.06 KG/M2 | HEIGHT: 65 IN | WEIGHT: 156.4 LBS | HEART RATE: 85 BPM | DIASTOLIC BLOOD PRESSURE: 72 MMHG | TEMPERATURE: 97.3 F | OXYGEN SATURATION: 98 % | SYSTOLIC BLOOD PRESSURE: 110 MMHG

## 2021-01-15 DIAGNOSIS — E03.9 ACQUIRED HYPOTHYROIDISM: Primary | ICD-10-CM

## 2021-01-15 DIAGNOSIS — E55.9 VITAMIN D DEFICIENCY: ICD-10-CM

## 2021-01-15 DIAGNOSIS — K59.00 CONSTIPATION, UNSPECIFIED CONSTIPATION TYPE: ICD-10-CM

## 2021-01-15 DIAGNOSIS — R53.83 FATIGUE, UNSPECIFIED TYPE: ICD-10-CM

## 2021-01-15 LAB
ALBUMIN SERPL-MCNC: 4.9 G/DL (ref 3.5–5.2)
ALBUMIN/GLOB SERPL: 2.3 G/DL
ALP SERPL-CCNC: 46 U/L (ref 39–117)
ALT SERPL W P-5'-P-CCNC: 17 U/L (ref 1–33)
ANION GAP SERPL CALCULATED.3IONS-SCNC: 9.3 MMOL/L (ref 5–15)
AST SERPL-CCNC: 15 U/L (ref 1–32)
BILIRUB SERPL-MCNC: 0.3 MG/DL (ref 0–1.2)
BUN SERPL-MCNC: 17 MG/DL (ref 6–20)
BUN/CREAT SERPL: 21 (ref 7–25)
CALCIUM SPEC-SCNC: 9.1 MG/DL (ref 8.6–10.5)
CHLORIDE SERPL-SCNC: 103 MMOL/L (ref 98–107)
CO2 SERPL-SCNC: 27.7 MMOL/L (ref 22–29)
CREAT SERPL-MCNC: 0.81 MG/DL (ref 0.57–1)
ESTRADIOL SERPL HS-MCNC: 71.5 PG/ML
FSH SERPL-ACNC: 3.05 MIU/ML
GFR SERPL CREATININE-BSD FRML MDRD: 78 ML/MIN/1.73
GLOBULIN UR ELPH-MCNC: 2.1 GM/DL
GLUCOSE SERPL-MCNC: 90 MG/DL (ref 65–99)
LH SERPL-ACNC: 1.68 MIU/ML
POTASSIUM SERPL-SCNC: 4.7 MMOL/L (ref 3.5–5.2)
PROT SERPL-MCNC: 7 G/DL (ref 6–8.5)
SODIUM SERPL-SCNC: 140 MMOL/L (ref 136–145)

## 2021-01-15 PROCEDURE — 83002 ASSAY OF GONADOTROPIN (LH): CPT | Performed by: INTERNAL MEDICINE

## 2021-01-15 PROCEDURE — 84439 ASSAY OF FREE THYROXINE: CPT | Performed by: INTERNAL MEDICINE

## 2021-01-15 PROCEDURE — 80053 COMPREHEN METABOLIC PANEL: CPT | Performed by: INTERNAL MEDICINE

## 2021-01-15 PROCEDURE — 82306 VITAMIN D 25 HYDROXY: CPT | Performed by: INTERNAL MEDICINE

## 2021-01-15 PROCEDURE — 82670 ASSAY OF TOTAL ESTRADIOL: CPT | Performed by: INTERNAL MEDICINE

## 2021-01-15 PROCEDURE — 83001 ASSAY OF GONADOTROPIN (FSH): CPT | Performed by: INTERNAL MEDICINE

## 2021-01-15 PROCEDURE — 99213 OFFICE O/P EST LOW 20 MIN: CPT | Performed by: INTERNAL MEDICINE

## 2021-01-15 PROCEDURE — 84443 ASSAY THYROID STIM HORMONE: CPT | Performed by: INTERNAL MEDICINE

## 2021-01-15 NOTE — PROGRESS NOTES
Chief complaint  Hypothyroidism (Follow UP:  Feels like levels are off having sx: Tirdness, sleepy) and Vitamin D Deficiency    Subjective   Subha Patino is a 40 y.o. female is here today for follow-up.    Hypothyroidism:  Current treatment includes Synthroid 100 mcg.  It was changed to levothyroxine.   Previous meds: generic levothyroxine triggered palpitations, Tirosint d/c due to cost. Synthroid seem to work best for her.   U/s thyroid in 2013 - small multiple nodules with largest measuring 3 mm.     Sprinoloactone started for acne and effective. She is taking 50 mg daily   Vit D deficiency - takes supplements.   C/o fatigue and lethargy, joint stiffness and pain, constipation requiring over the counter measures. The sx are ongoing for 2-3 weeks. She started Effexor and chantix, asked if it could be related to current meds.     Medications    Current Outpatient Medications:   •  busPIRone (BUSPAR) 10 MG tablet, Take 1 tablet by mouth 3 (Three) Times a Day., Disp: 270 tablet, Rfl: 1  •  Chantix 1 MG tablet, TAKE 1 TABLET BY MOUTH TWICE A DAY, Disp: 56 tablet, Rfl: 1  •  ciclopirox (PENLAC) 8 % solution, Apply  topically to the appropriate area as directed Every Night., Disp: 6 mL, Rfl: 3  •  Cyanocobalamin (Vitamin B-12) 5000 MCG sublingual tablet, Take 5,000 mcg by mouth Daily., Disp: 90 tablet, Rfl: 1  •  cyclobenzaprine (Flexeril) 10 MG tablet, Take 1 tablet by mouth Daily., Disp: 30 tablet, Rfl: 4  •  HYDROcodone-acetaminophen (NORCO)  MG per tablet, Take 1 tablet by mouth Every 8 (Eight) Hours As Needed for Moderate Pain . Fill after 12/18/2020, Disp: 90 tablet, Rfl: 0  •  levothyroxine (SYNTHROID, LEVOTHROID) 100 MCG tablet, TAKE 1 TABLET BY MOUTH EVERY DAY, Disp: 90 tablet, Rfl: 1  •  spironolactone (ALDACTONE) 50 MG tablet, Take 2 tablets by mouth Daily., Disp: 60 tablet, Rfl: 11  •  venlafaxine XR (Effexor XR) 75 MG 24 hr capsule, Take 1 capsule by mouth Daily. Start after 37.5mg dose, Disp: 30  "capsule, Rfl: 3  •  venlafaxine XR (Effexor XR) 37.5 MG 24 hr capsule, Take 1 capsule by mouth Daily for 7 days., Disp: 7 capsule, Rfl: 0    PMH  The following portions of the patient's history were reviewed and updated as appropriate: allergies, current medications, past family history, past medical history, past social history, past surgical history and problem list.    Review of systems  Review of Systems   Constitutional: Positive for fatigue.   Cardiovascular: Negative for palpitations (intermittent).   Gastrointestinal: Positive for constipation.   Psychiatric/Behavioral: The patient is not nervous/anxious.    All other systems reviewed and are negative.      Physical exam  Objective   Blood pressure 110/72, pulse 85, temperature 97.3 °F (36.3 °C), height 165.1 cm (65\"), weight 70.9 kg (156 lb 6.4 oz), SpO2 98 %, not currently breastfeeding.   Physical Exam   Constitutional: She is oriented to person, place, and time. She appears well-developed.   HENT:   Head: Normocephalic and atraumatic.   Eyes: Conjunctivae are normal.   Neck: Normal range of motion. No muscular tenderness present. Carotid bruit is not present. No thyroid mass and no thyromegaly present.   The neck is supple and no assimetry visualized   Cardiovascular: Normal rate, regular rhythm and normal heart sounds.   Pulmonary/Chest: Effort normal and breath sounds normal.   Lymphadenopathy:     She has no cervical adenopathy.   Neurological: She is alert and oriented to person, place, and time.   Psychiatric: Thought content normal.   Vitals reviewed.        LABS AND IMAGING  Lab Results   Component Value Date    TSH 0.859 06/19/2020    I1RTJQH 11.70 (H) 09/13/2018           Assessment  Assessment/Plan   Problems Addressed this Visit        Other    Hypothyroidism - Primary    Relevant Orders    TSH    T4, Free    Comprehensive Metabolic Panel    Vitamin D deficiency    Relevant Orders    Vitamin D 25 Hydroxy      Other Visit Diagnoses     Fatigue, " unspecified type        Constipation, unspecified constipation type          Diagnoses       Codes Comments    Acquired hypothyroidism    -  Primary ICD-10-CM: E03.9  ICD-9-CM: 244.9     Vitamin D deficiency     ICD-10-CM: E55.9  ICD-9-CM: 268.9     Fatigue, unspecified type     ICD-10-CM: R53.83  ICD-9-CM: 780.79     Constipation, unspecified constipation type     ICD-10-CM: K59.00  ICD-9-CM: 564.00           Plan  Synthroid 100 mcg daily , repeat labs. She has some sx of hypothyroidism.   -continue spironolactone, recheck BMP  -cont Vit D supplements 2000 units daily.   Constipation could be part of the thyroid problem. Cont miralax and high fiber meals.   Follow-up in 9-12 months

## 2021-01-16 LAB
25(OH)D3 SERPL-MCNC: 53.9 NG/ML (ref 30–100)
T4 FREE SERPL-MCNC: 1.36 NG/DL (ref 0.93–1.7)
TSH SERPL DL<=0.05 MIU/L-ACNC: 3.18 UIU/ML (ref 0.27–4.2)

## 2021-01-18 DIAGNOSIS — E03.9 ACQUIRED HYPOTHYROIDISM: Primary | ICD-10-CM

## 2021-01-18 DIAGNOSIS — M54.40 BILATERAL LOW BACK PAIN WITH SCIATICA, SCIATICA LATERALITY UNSPECIFIED, UNSPECIFIED CHRONICITY: ICD-10-CM

## 2021-01-18 RX ORDER — HYDROCODONE BITARTRATE AND ACETAMINOPHEN 10; 325 MG/1; MG/1
1 TABLET ORAL EVERY 8 HOURS PRN
Qty: 90 TABLET | Refills: 0 | Status: SHIPPED | OUTPATIENT
Start: 2021-01-18 | End: 2021-01-18 | Stop reason: SDUPTHER

## 2021-01-18 RX ORDER — HYDROCODONE BITARTRATE AND ACETAMINOPHEN 10; 325 MG/1; MG/1
1 TABLET ORAL EVERY 8 HOURS PRN
Qty: 90 TABLET | Refills: 0 | Status: SHIPPED | OUTPATIENT
Start: 2021-01-18 | End: 2021-02-18 | Stop reason: SDUPTHER

## 2021-01-18 RX ORDER — LEVOTHYROXINE SODIUM 112 MCG
112 TABLET ORAL DAILY
Qty: 30 TABLET | Refills: 6 | Status: SHIPPED | OUTPATIENT
Start: 2021-01-18 | End: 2021-01-20

## 2021-01-18 NOTE — TELEPHONE ENCOUNTER
Caller: Subha Patino    Relationship: Self    Best call back number: [phone]    Medication needed:   Requested Prescriptions     Pending Prescriptions Disp Refills   • HYDROcodone-acetaminophen (NORCO)  MG per tablet 90 tablet 0     Sig: Take 1 tablet by mouth Every 8 (Eight) Hours As Needed for Moderate Pain . Fill after 12/18/2020       When do you need the refill by: TODAY    What details did the patient provide when requesting the medication: HAS ANOTHER DAY LEFT    Does the patient have less than a 3 day supply:  [x] Yes  [] No    What is the patient's preferred pharmacy: [id number] - [place], [place] - [address]  AT [facility] [address] [facility] [address] - [phone]  - [phone] FX     PATIENT ALSO ASKING FOR HER BLOOD WORK RESULTS.

## 2021-01-19 NOTE — TELEPHONE ENCOUNTER
Rx sent.  Please let her know labs mailed today with the following message-     Your hormones are normal and indicates that you are not menopausal.  Therefore your symptoms could be very early premenopausal state or just regular hormonal fluctuations.  Please continue current regimen

## 2021-01-20 ENCOUNTER — TELEPHONE (OUTPATIENT)
Dept: ENDOCRINOLOGY | Facility: CLINIC | Age: 41
End: 2021-01-20

## 2021-01-20 DIAGNOSIS — R23.2 HOT FLASHES: ICD-10-CM

## 2021-01-20 NOTE — TELEPHONE ENCOUNTER
Last Office Visit: 12/15/20  Next Office Visit:2/23/21    Labs completed in past 6 months? yes  Labs completed in past year? yes    Last Refill Date: 12/15/20  Quantity:30  Refills:3  Requesting a 90 day supply    Pharmacy:

## 2021-01-20 NOTE — TELEPHONE ENCOUNTER
Returned patient call, let her know that the RX was sent in but for 30 day supply  Will resend for 90 day supply

## 2021-01-20 NOTE — TELEPHONE ENCOUNTER
"Pt stated when she went to  her Rx  Synthroid [facility] pharmacy contacted her  and stated that \"PCP has not responded to approve the 3 month supply for LY\". She needs clarification on what that means. Please advise  "

## 2021-01-21 RX ORDER — VENLAFAXINE HYDROCHLORIDE 75 MG/1
75 CAPSULE, EXTENDED RELEASE ORAL DAILY
Qty: 90 CAPSULE | Refills: 0 | Status: SHIPPED | OUTPATIENT
Start: 2021-01-21 | End: 2021-02-23

## 2021-01-24 RX ORDER — LEVOTHYROXINE SODIUM 112 MCG
112 TABLET ORAL DAILY
Qty: 90 TABLET | Refills: 1 | Status: SHIPPED | OUTPATIENT
Start: 2021-01-24 | End: 2022-01-03

## 2021-02-18 DIAGNOSIS — M54.40 BILATERAL LOW BACK PAIN WITH SCIATICA, SCIATICA LATERALITY UNSPECIFIED, UNSPECIFIED CHRONICITY: ICD-10-CM

## 2021-02-18 RX ORDER — HYDROCODONE BITARTRATE AND ACETAMINOPHEN 10; 325 MG/1; MG/1
1 TABLET ORAL EVERY 8 HOURS PRN
Qty: 21 TABLET | Refills: 0 | Status: SHIPPED | OUTPATIENT
Start: 2021-02-18 | End: 2021-02-23 | Stop reason: SDUPTHER

## 2021-02-18 NOTE — TELEPHONE ENCOUNTER
Last appointment: 12/15/20  Next appointment:  02/23/21  Sy:   UDS:06/24/20  CSA:     Last Refill: 01/18/21  Quantity: 90  Refills: 0

## 2021-02-18 NOTE — TELEPHONE ENCOUNTER
Caller: Subha Patino    Relationship: Self    Best call back number: [phone]     Medication needed:   Requested Prescriptions     Pending Prescriptions Disp Refills   • HYDROcodone-acetaminophen (NORCO)  MG per tablet 90 tablet 0     Sig: Take 1 tablet by mouth Every 8 (Eight) Hours As Needed for Moderate Pain .       Does the patient have less than a 3 day supply:  [] Yes  [x] No    What is the patient's preferred pharmacy: [id number] [place] [address] AT [facility] [address] - [phone]  - [phone] FX

## 2021-02-23 ENCOUNTER — OFFICE VISIT (OUTPATIENT)
Dept: INTERNAL MEDICINE | Facility: CLINIC | Age: 41
End: 2021-02-23

## 2021-02-23 ENCOUNTER — LAB (OUTPATIENT)
Dept: LAB | Facility: HOSPITAL | Age: 41
End: 2021-02-23

## 2021-02-23 VITALS
TEMPERATURE: 97.8 F | WEIGHT: 158.8 LBS | OXYGEN SATURATION: 99 % | DIASTOLIC BLOOD PRESSURE: 60 MMHG | SYSTOLIC BLOOD PRESSURE: 102 MMHG | HEIGHT: 65 IN | HEART RATE: 73 BPM | BODY MASS INDEX: 26.46 KG/M2

## 2021-02-23 DIAGNOSIS — E53.8 VITAMIN B12 DEFICIENCY: ICD-10-CM

## 2021-02-23 DIAGNOSIS — M19.049 HAND ARTHRITIS: Primary | ICD-10-CM

## 2021-02-23 DIAGNOSIS — M54.40 BILATERAL LOW BACK PAIN WITH SCIATICA, SCIATICA LATERALITY UNSPECIFIED, UNSPECIFIED CHRONICITY: ICD-10-CM

## 2021-02-23 DIAGNOSIS — M19.049 HAND ARTHRITIS: ICD-10-CM

## 2021-02-23 DIAGNOSIS — N39.0 RECURRENT UTI: ICD-10-CM

## 2021-02-23 DIAGNOSIS — E03.9 ACQUIRED HYPOTHYROIDISM: ICD-10-CM

## 2021-02-23 DIAGNOSIS — L70.9 ACNE, UNSPECIFIED ACNE TYPE: ICD-10-CM

## 2021-02-23 DIAGNOSIS — F41.9 ANXIETY: ICD-10-CM

## 2021-02-23 LAB
ANION GAP SERPL CALCULATED.3IONS-SCNC: 8.1 MMOL/L (ref 5–15)
BUN SERPL-MCNC: 8 MG/DL (ref 6–20)
BUN/CREAT SERPL: 12.5 (ref 7–25)
CALCIUM SPEC-SCNC: 9.2 MG/DL (ref 8.6–10.5)
CHLORIDE SERPL-SCNC: 104 MMOL/L (ref 98–107)
CO2 SERPL-SCNC: 25.9 MMOL/L (ref 22–29)
CREAT SERPL-MCNC: 0.64 MG/DL (ref 0.57–1)
GFR SERPL CREATININE-BSD FRML MDRD: 102 ML/MIN/1.73
GLUCOSE SERPL-MCNC: 86 MG/DL (ref 65–99)
POTASSIUM SERPL-SCNC: 4.3 MMOL/L (ref 3.5–5.2)
SODIUM SERPL-SCNC: 138 MMOL/L (ref 136–145)

## 2021-02-23 PROCEDURE — 80048 BASIC METABOLIC PNL TOTAL CA: CPT

## 2021-02-23 PROCEDURE — 84439 ASSAY OF FREE THYROXINE: CPT

## 2021-02-23 PROCEDURE — 85652 RBC SED RATE AUTOMATED: CPT

## 2021-02-23 PROCEDURE — 84443 ASSAY THYROID STIM HORMONE: CPT

## 2021-02-23 PROCEDURE — 86200 CCP ANTIBODY: CPT

## 2021-02-23 PROCEDURE — 82607 VITAMIN B-12: CPT

## 2021-02-23 PROCEDURE — 86431 RHEUMATOID FACTOR QUANT: CPT

## 2021-02-23 PROCEDURE — 99214 OFFICE O/P EST MOD 30 MIN: CPT | Performed by: INTERNAL MEDICINE

## 2021-02-23 RX ORDER — MELOXICAM 7.5 MG/1
7.5 TABLET ORAL DAILY
Qty: 30 TABLET | Refills: 3 | Status: SHIPPED | OUTPATIENT
Start: 2021-02-23 | End: 2021-04-23

## 2021-02-23 RX ORDER — HYDROCODONE BITARTRATE AND ACETAMINOPHEN 10; 325 MG/1; MG/1
1 TABLET ORAL EVERY 8 HOURS PRN
Qty: 21 TABLET | Refills: 0 | Status: SHIPPED | OUTPATIENT
Start: 2021-02-23 | End: 2021-02-26

## 2021-02-23 RX ORDER — BUSPIRONE HYDROCHLORIDE 10 MG/1
10 TABLET ORAL 3 TIMES DAILY
Qty: 270 TABLET | Refills: 1 | Status: SHIPPED | OUTPATIENT
Start: 2021-02-23 | End: 2021-06-28 | Stop reason: SDUPTHER

## 2021-02-23 RX ORDER — NITROFURANTOIN MACROCRYSTALS 100 MG/1
CAPSULE ORAL
Qty: 20 CAPSULE | Refills: 1 | Status: SHIPPED | OUTPATIENT
Start: 2021-02-23 | End: 2021-04-23 | Stop reason: SDUPTHER

## 2021-02-23 NOTE — PROGRESS NOTES
Back Pain (needs script for macrobid) and Hand Pain    Subjective   Subha Patino is a 40 y.o. female is here today for follow-up.    History of Present Illness   Hands and knuckles hurt since started the effexor which also constipated her.  Thyroid dose changed. Needs rechecked.  Takes herbal supplement for aches.    Current Outpatient Medications:   •  ciclopirox (PENLAC) 8 % solution, Apply  topically to the appropriate area as directed Every Night., Disp: 6 mL, Rfl: 3  •  cyclobenzaprine (Flexeril) 10 MG tablet, Take 1 tablet by mouth Daily., Disp: 30 tablet, Rfl: 4  •  HYDROcodone-acetaminophen (NORCO)  MG per tablet, Take 1 tablet by mouth Every 8 (Eight) Hours As Needed for Moderate Pain ., Disp: 90 tablet, Rfl: 0  •  spironolactone (ALDACTONE) 50 MG tablet, Take 2 tablets by mouth Daily., Disp: 60 tablet, Rfl: 11  •  Synthroid 112 MCG tablet, Take 1 tablet by mouth Daily. On empty stomach, Disp: 90 tablet, Rfl: 1  •  busPIRone (BUSPAR) 10 MG tablet, Take 1 tablet by mouth 3 (Three) Times a Day., Disp: 270 tablet, Rfl: 1  •  Cyanocobalamin (Vitamin B-12) 5000 MCG sublingual tablet, Take 5,000 mcg by mouth Daily., Disp: 90 tablet, Rfl: 1  •  meloxicam (Mobic) 7.5 MG tablet, Take 1 tablet by mouth Daily. arthritis, Disp: 30 tablet, Rfl: 3  •  nitrofurantoin (Macrodantin) 100 MG capsule, 1 PO prn intercourse, Disp: 20 capsule, Rfl: 1      The following portions of the patient's history were reviewed and updated as appropriate: allergies, current medications, past family history, past medical history, past social history, past surgical history and problem list.    Review of Systems   Constitutional: Negative.  Negative for chills and fever.   HENT: Negative for ear discharge, ear pain, sinus pressure and sore throat.    Respiratory: Negative for cough, chest tightness and shortness of breath.    Cardiovascular: Negative for chest pain, palpitations and leg swelling.   Gastrointestinal: Negative for  "diarrhea, nausea and vomiting.   Musculoskeletal: Positive for arthralgias and myalgias. Negative for back pain.   Neurological: Negative for dizziness, syncope and headaches.   Psychiatric/Behavioral: Negative for confusion and sleep disturbance.       Objective   /60   Pulse 73   Temp 97.8 °F (36.6 °C)   Ht 165.1 cm (65\")   Wt 72 kg (158 lb 12.8 oz)   SpO2 99% Comment: ra  BMI 26.43 kg/m²   Physical Exam  Vitals signs and nursing note reviewed.   Constitutional:       Appearance: She is well-developed.   HENT:      Head: Normocephalic and atraumatic.      Right Ear: External ear normal.      Left Ear: External ear normal.      Mouth/Throat:      Pharynx: No oropharyngeal exudate.   Eyes:      Conjunctiva/sclera: Conjunctivae normal.      Pupils: Pupils are equal, round, and reactive to light.   Neck:      Musculoskeletal: Neck supple.      Thyroid: No thyromegaly.   Cardiovascular:      Rate and Rhythm: Normal rate and regular rhythm.   Pulmonary:      Effort: Pulmonary effort is normal.      Breath sounds: Normal breath sounds.   Abdominal:      General: Bowel sounds are normal. There is no distension.      Palpations: Abdomen is soft.      Tenderness: There is no abdominal tenderness.   Musculoskeletal:         General: Tenderness present.   Skin:     General: Skin is warm and dry.   Neurological:      Mental Status: She is alert and oriented to person, place, and time.      Cranial Nerves: No cranial nerve deficit.   Psychiatric:         Judgment: Judgment normal.           Results for orders placed or performed in visit on 01/15/21   Vitamin D 25 Hydroxy    Specimen: Blood   Result Value Ref Range    25 Hydroxy, Vitamin D 53.9 30.0 - 100.0 ng/ml   TSH    Specimen: Blood   Result Value Ref Range    TSH 3.180 0.270 - 4.200 uIU/mL   T4, Free    Specimen: Blood   Result Value Ref Range    Free T4 1.36 0.93 - 1.70 ng/dL   Comprehensive Metabolic Panel    Specimen: Blood   Result Value Ref Range    " Glucose 90 65 - 99 mg/dL    BUN 17 6 - 20 mg/dL    Creatinine 0.81 0.57 - 1.00 mg/dL    Sodium 140 136 - 145 mmol/L    Potassium 4.7 3.5 - 5.2 mmol/L    Chloride 103 98 - 107 mmol/L    CO2 27.7 22.0 - 29.0 mmol/L    Calcium 9.1 8.6 - 10.5 mg/dL    Total Protein 7.0 6.0 - 8.5 g/dL    Albumin 4.90 3.50 - 5.20 g/dL    ALT (SGPT) 17 1 - 33 U/L    AST (SGOT) 15 1 - 32 U/L    Alkaline Phosphatase 46 39 - 117 U/L    Total Bilirubin 0.3 0.0 - 1.2 mg/dL    eGFR Non African Amer 78 >60 mL/min/1.73    Globulin 2.1 gm/dL    A/G Ratio 2.3 g/dL    BUN/Creatinine Ratio 21.0 7.0 - 25.0    Anion Gap 9.3 5.0 - 15.0 mmol/L             Assessment/Plan   Diagnoses and all orders for this visit:    Hand arthritis  -     meloxicam (Mobic) 7.5 MG tablet; Take 1 tablet by mouth Daily. arthritis  -     Basic Metabolic Panel; Future  -     Cyclic Citrul Peptide Antibody, IgG / IgA; Future  -     Rheumatoid Factor; Future  -     Sedimentation Rate; Future    Anxiety  -     busPIRone (BUSPAR) 10 MG tablet; Take 1 tablet by mouth 3 (Three) Times a Day.    Vitamin B12 deficiency  -     Cyanocobalamin (Vitamin B-12) 5000 MCG sublingual tablet; Take 5,000 mcg by mouth Daily.  -     Vitamin B12; Future    Bilateral low back pain with sciatica, sciatica laterality unspecified, unspecified chronicity  -     Discontinue: HYDROcodone-acetaminophen (NORCO)  MG per tablet; Take 1 tablet by mouth Every 8 (Eight) Hours As Needed for Moderate Pain .  -     Basic Metabolic Panel; Future  -     HYDROcodone-acetaminophen (NORCO)  MG per tablet; Take 1 tablet by mouth Every 8 (Eight) Hours As Needed for Moderate Pain .    Acne, unspecified acne type    Recurrent UTI  -     nitrofurantoin (Macrodantin) 100 MG capsule; 1 PO prn intercourse      The patient has read and signed the [facility] Controlled Substance Contract.  I will continue to see patient for regular follow up appointments.  They are well controlled on their medication.  MARJORIE has  been reviewed by me and is updated every 3 months. The patient is aware of the potential for addiction and dependence.             Return in about 2 months (around 4/23/2021) for Next scheduled follow up.

## 2021-02-24 LAB
CHROMATIN AB SERPL-ACNC: <10 IU/ML (ref 0–14)
ERYTHROCYTE [SEDIMENTATION RATE] IN BLOOD: 1 MM/HR (ref 0–20)
T4 FREE SERPL-MCNC: 1.72 NG/DL (ref 0.93–1.7)
TSH SERPL DL<=0.05 MIU/L-ACNC: 1.92 UIU/ML (ref 0.27–4.2)
VIT B12 BLD-MCNC: 519 PG/ML (ref 211–946)

## 2021-02-25 LAB — CCP IGA+IGG SERPL IA-ACNC: 6 UNITS (ref 0–19)

## 2021-02-26 ENCOUNTER — TELEPHONE (OUTPATIENT)
Dept: INTERNAL MEDICINE | Facility: CLINIC | Age: 41
End: 2021-02-26

## 2021-02-26 RX ORDER — HYDROCODONE BITARTRATE AND ACETAMINOPHEN 10; 325 MG/1; MG/1
1 TABLET ORAL EVERY 8 HOURS PRN
Qty: 90 TABLET | Refills: 0 | Status: SHIPPED | OUTPATIENT
Start: 2021-02-26 | End: 2021-04-01 | Stop reason: SDUPTHER

## 2021-02-26 NOTE — TELEPHONE ENCOUNTER
Last appointment: 02/23/21  Next appointment:  04/2321  Sy:   UDS:06/24/20  CSA:     Last Refill: 02/23/21  Quantity: 21  Refills: 0

## 2021-02-26 NOTE — TELEPHONE ENCOUNTER
PATIENT HAD AN APPOINTMENT ON 02/23/2021 AND FOR HER PAIN MEDICATION SHE USUALLY GETS A 90 PILLS AND ON 02/23/2021 ONLY 21 PILLS WERE CALLED IN.    PLEASE ADVISE AND CALL PATIENT BACK -[phone]     [facility] IN [place]

## 2021-04-01 DIAGNOSIS — M54.40 BILATERAL LOW BACK PAIN WITH SCIATICA, SCIATICA LATERALITY UNSPECIFIED, UNSPECIFIED CHRONICITY: ICD-10-CM

## 2021-04-01 NOTE — TELEPHONE ENCOUNTER
Caller: Subha Patino    Relationship: Self    Best call back number: [phone]    Medication needed:   Requested Prescriptions     Pending Prescriptions Disp Refills   • HYDROcodone-acetaminophen (NORCO)  MG per tablet 90 tablet 0     Sig: Take 1 tablet by mouth Every 8 (Eight) Hours As Needed for Moderate Pain .       When do you need the refill by: 04/03/2021    What additional details did the patient provide when requesting the medication: PATIENT IS DUE FOR A REFILL ON 04/03/2021    Does the patient have less than a 3 day supply:  [x] Yes  [] No    What is the patient's preferred pharmacy:      [facility] ON [address]

## 2021-04-03 NOTE — TELEPHONE ENCOUNTER
Last appointment: 2/23/21  Next appointment:  4/23/21      Last Refill:  2/26/21  Quantity: 90 tab  Refills: 0

## 2021-04-04 RX ORDER — HYDROCODONE BITARTRATE AND ACETAMINOPHEN 10; 325 MG/1; MG/1
1 TABLET ORAL EVERY 8 HOURS PRN
Qty: 90 TABLET | Refills: 0 | Status: SHIPPED | OUTPATIENT
Start: 2021-04-04 | End: 2021-04-23 | Stop reason: SDUPTHER

## 2021-04-06 RX ORDER — VARENICLINE TARTRATE 1 MG/1
TABLET, FILM COATED ORAL
Qty: 56 TABLET | Refills: 1 | Status: SHIPPED | OUTPATIENT
Start: 2021-04-06 | End: 2021-06-28

## 2021-04-06 NOTE — TELEPHONE ENCOUNTER
Last Office Visit: 2/23/21  Next Office Visit: 4/23/21    Labs completed in past 6 months? yes  Labs completed in past year? yes    Last Refill Date: no longer on med list  Quantity:  Refills:    Pharmacy:     Please review pended refill request for any changes needed on refills or quantities. Thank you!

## 2021-04-23 ENCOUNTER — OFFICE VISIT (OUTPATIENT)
Dept: INTERNAL MEDICINE | Facility: CLINIC | Age: 41
End: 2021-04-23

## 2021-04-23 VITALS
DIASTOLIC BLOOD PRESSURE: 62 MMHG | HEIGHT: 65 IN | OXYGEN SATURATION: 98 % | BODY MASS INDEX: 24.66 KG/M2 | TEMPERATURE: 97.7 F | WEIGHT: 148 LBS | SYSTOLIC BLOOD PRESSURE: 100 MMHG | HEART RATE: 83 BPM

## 2021-04-23 DIAGNOSIS — M19.042 PRIMARY OSTEOARTHRITIS OF BOTH HANDS: Primary | ICD-10-CM

## 2021-04-23 DIAGNOSIS — N39.0 RECURRENT UTI: ICD-10-CM

## 2021-04-23 DIAGNOSIS — M54.40 BILATERAL LOW BACK PAIN WITH SCIATICA, SCIATICA LATERALITY UNSPECIFIED, UNSPECIFIED CHRONICITY: ICD-10-CM

## 2021-04-23 DIAGNOSIS — M19.041 PRIMARY OSTEOARTHRITIS OF BOTH HANDS: Primary | ICD-10-CM

## 2021-04-23 PROCEDURE — 99214 OFFICE O/P EST MOD 30 MIN: CPT | Performed by: INTERNAL MEDICINE

## 2021-04-23 RX ORDER — HYDROCODONE BITARTRATE AND ACETAMINOPHEN 10; 325 MG/1; MG/1
1 TABLET ORAL EVERY 8 HOURS PRN
Qty: 90 TABLET | Refills: 0 | Status: SHIPPED | OUTPATIENT
Start: 2021-04-23 | End: 2021-06-07 | Stop reason: SDUPTHER

## 2021-04-23 RX ORDER — NITROFURANTOIN MACROCRYSTALS 100 MG/1
CAPSULE ORAL
Qty: 20 CAPSULE | Refills: 1 | Status: SHIPPED | OUTPATIENT
Start: 2021-04-23 | End: 2021-06-28 | Stop reason: SDUPTHER

## 2021-04-23 RX ORDER — CYCLOBENZAPRINE HCL 10 MG
10 TABLET ORAL DAILY
Qty: 30 TABLET | Refills: 4 | Status: SHIPPED | OUTPATIENT
Start: 2021-04-23 | End: 2021-06-28 | Stop reason: SDUPTHER

## 2021-04-23 NOTE — PROGRESS NOTES
Back Pain    Subjective   Subha Patino is a 40 y.o. female is here today for follow-up.    History of Present Illness   Plans to give notice to her [place] location.  Follow-up on her back pain, recurrent UTI.  Well-controlled on the current regimen.  Hands hurt at the base of the thumb.  After work gets very painful.    Current Outpatient Medications:   •  busPIRone (BUSPAR) 10 MG tablet, Take 1 tablet by mouth 3 (Three) Times a Day., Disp: 270 tablet, Rfl: 1  •  Chantix Continuing Month Jorge 1 MG tablet, TAKE 1 TABLET BY MOUTH TWICE A DAY, Disp: 56 tablet, Rfl: 1  •  Cyanocobalamin (Vitamin B-12) 5000 MCG sublingual tablet, Take 5,000 mcg by mouth Daily., Disp: 90 tablet, Rfl: 1  •  cyclobenzaprine (FLEXERIL) 10 MG tablet, Take 1 tablet by mouth Daily., Disp: 30 tablet, Rfl: 4  •  HYDROcodone-acetaminophen (NORCO)  MG per tablet, Take 1 tablet by mouth Every 8 (Eight) Hours As Needed for Moderate Pain ., Disp: 90 tablet, Rfl: 0  •  nitrofurantoin (Macrodantin) 100 MG capsule, 1 PO prn intercourse, Disp: 20 capsule, Rfl: 1  •  spironolactone (ALDACTONE) 50 MG tablet, Take 2 tablets by mouth Daily., Disp: 60 tablet, Rfl: 11  •  Synthroid 112 MCG tablet, Take 1 tablet by mouth Daily. On empty stomach, Disp: 90 tablet, Rfl: 1  •  Diclofenac Sodium (VOLTAREN) 1 % gel gel, Apply 4 g topically to the appropriate area as directed 3 (Three) Times a Day., Disp: 100 g, Rfl: 3      The following portions of the patient's history were reviewed and updated as appropriate: allergies, current medications, past family history, past medical history, past social history, past surgical history and problem list.    Review of Systems   Constitutional: Negative.  Negative for chills and fever.   HENT: Negative for ear discharge, ear pain, sinus pressure and sore throat.    Respiratory: Negative for cough, chest tightness and shortness of breath.    Cardiovascular: Negative for chest pain, palpitations and leg swelling.  "  Gastrointestinal: Negative for diarrhea, nausea and vomiting.   Musculoskeletal: Positive for arthralgias and back pain. Negative for myalgias.   Neurological: Negative for dizziness, syncope and headaches.   Psychiatric/Behavioral: Negative for confusion and sleep disturbance.       Objective   /62   Pulse 83   Temp 97.7 °F (36.5 °C)   Ht 165.1 cm (65\")   Wt 67.1 kg (148 lb)   SpO2 98% Comment: ra  BMI 24.63 kg/m²   Physical Exam  Vitals and nursing note reviewed.   Constitutional:       Appearance: She is well-developed.   HENT:      Head: Normocephalic and atraumatic.      Right Ear: External ear normal.      Left Ear: External ear normal.      Mouth/Throat:      Pharynx: No oropharyngeal exudate.   Eyes:      Conjunctiva/sclera: Conjunctivae normal.      Pupils: Pupils are equal, round, and reactive to light.   Neck:      Thyroid: No thyromegaly.   Cardiovascular:      Rate and Rhythm: Normal rate and regular rhythm.   Pulmonary:      Effort: Pulmonary effort is normal.      Breath sounds: Normal breath sounds.   Abdominal:      General: Bowel sounds are normal. There is no distension.      Palpations: Abdomen is soft.      Tenderness: There is no abdominal tenderness.   Musculoskeletal:         General: Tenderness (1st mcp jt) present.      Cervical back: Neck supple.   Skin:     General: Skin is warm and dry.   Neurological:      Mental Status: She is alert and oriented to person, place, and time.      Cranial Nerves: No cranial nerve deficit.   Psychiatric:         Judgment: Judgment normal.           Results for orders placed or performed in visit on 02/23/21   TSH    Specimen: Blood   Result Value Ref Range    TSH 1.920 0.270 - 4.200 uIU/mL   T4, Free    Specimen: Blood   Result Value Ref Range    Free T4 1.72 (H) 0.93 - 1.70 ng/dL   Basic Metabolic Panel    Specimen: Blood   Result Value Ref Range    Glucose 86 65 - 99 mg/dL    BUN 8 6 - 20 mg/dL    Creatinine 0.64 0.57 - 1.00 mg/dL    Sodium " 138 136 - 145 mmol/L    Potassium 4.3 3.5 - 5.2 mmol/L    Chloride 104 98 - 107 mmol/L    CO2 25.9 22.0 - 29.0 mmol/L    Calcium 9.2 8.6 - 10.5 mg/dL    eGFR Non African Amer 102 >60 mL/min/1.73    BUN/Creatinine Ratio 12.5 7.0 - 25.0    Anion Gap 8.1 5.0 - 15.0 mmol/L   Cyclic Citrul Peptide Antibody, IgG / IgA    Specimen: Blood   Result Value Ref Range    CCP Antibodies IgG/IgA 6 0 - 19 units   Rheumatoid Factor    Specimen: Blood   Result Value Ref Range    Rheumatoid Factor Quantitative <10.0 0.0 - 14.0 IU/mL   Sedimentation Rate    Specimen: Blood   Result Value Ref Range    Sed Rate 1 0 - 20 mm/hr   Vitamin B12    Specimen: Blood   Result Value Ref Range    Vitamin B-12 519 211 - 946 pg/mL             Assessment/Plan   Diagnoses and all orders for this visit:    Primary osteoarthritis of both hands  Comments:  right > left, chiefly at 1st mcp jt.  Orders:  -     Diclofenac Sodium (VOLTAREN) 1 % gel gel; Apply 4 g topically to the appropriate area as directed 3 (Three) Times a Day.    Bilateral low back pain with sciatica, sciatica laterality unspecified, unspecified chronicity  -     cyclobenzaprine (FLEXERIL) 10 MG tablet; Take 1 tablet by mouth Daily.  -     HYDROcodone-acetaminophen (NORCO)  MG per tablet; Take 1 tablet by mouth Every 8 (Eight) Hours As Needed for Moderate Pain .    Recurrent UTI  -     nitrofurantoin (Macrodantin) 100 MG capsule; 1 PO prn intercourse    call if hand pain worse, can refer to Dr. Harry.      The patient has read and signed the [facility] Controlled Substance Contract.  I will continue to see patient for regular follow up appointments.  They are well controlled on their medication.  MARJORIE has been reviewed by me and is updated every 3 months. The patient is aware of the potential for addiction and dependence.               Return for Next scheduled follow up.

## 2021-05-04 DIAGNOSIS — M54.40 BILATERAL LOW BACK PAIN WITH SCIATICA, SCIATICA LATERALITY UNSPECIFIED, UNSPECIFIED CHRONICITY: ICD-10-CM

## 2021-05-04 NOTE — TELEPHONE ENCOUNTER
Caller: Subha Patino    Relationship: Self    Best call back number [phone]     Medication needed:   Requested Prescriptions     Pending Prescriptions Disp Refills   • HYDROcodone-acetaminophen (NORCO)  MG per tablet 90 tablet 0     Sig: Take 1 tablet by mouth Every 8 (Eight) Hours As Needed for Moderate Pain .       When do you need the refill by: ASAP    What additional details did the patient provide when requesting the medication: PATIENT IS REQUESTING A REFILL ON ABOVE MEDICATION    Does the patient have less than a 3 day supply:  [x] Yes  [] No    What is the patient's preferred pharmacy: [facility]/PHARMACY #[id number] - [place], [place] - [address] AT NEXT TO [facility] - [phone]  - [phone] FX

## 2021-05-05 RX ORDER — HYDROCODONE BITARTRATE AND ACETAMINOPHEN 10; 325 MG/1; MG/1
1 TABLET ORAL EVERY 8 HOURS PRN
Qty: 90 TABLET | Refills: 0 | OUTPATIENT
Start: 2021-05-05

## 2021-06-07 DIAGNOSIS — M54.40 BILATERAL LOW BACK PAIN WITH SCIATICA, SCIATICA LATERALITY UNSPECIFIED, UNSPECIFIED CHRONICITY: ICD-10-CM

## 2021-06-07 RX ORDER — HYDROCODONE BITARTRATE AND ACETAMINOPHEN 10; 325 MG/1; MG/1
1 TABLET ORAL EVERY 8 HOURS PRN
Qty: 90 TABLET | Refills: 0 | Status: SHIPPED | OUTPATIENT
Start: 2021-06-07 | End: 2021-06-28 | Stop reason: SDUPTHER

## 2021-06-07 NOTE — TELEPHONE ENCOUNTER
Last Office Visit: 04/23/21  Next Office Visit: 06/28/21    Labs completed in past 6 months? yes  Labs completed in past year? yes    Last Refill Date: 04/23/21  Quantity:90  Refills:0    Pharmacy:     Please review pended refill request for any changes needed on refills or quantities. Thank you!

## 2021-06-07 NOTE — TELEPHONE ENCOUNTER
Caller: Subha Patino    Relationship: Self    Best call back number: [phone]    Medication needed:   Requested Prescriptions     Pending Prescriptions Disp Refills   • HYDROcodone-acetaminophen (NORCO)  MG per tablet 90 tablet 0     Sig: Take 1 tablet by mouth Every 8 (Eight) Hours As Needed for Moderate Pain .       When do you need the refill by: [phone]    What additional details did the patient provide when requesting the medication: ALMOST OUT     Does the patient have less than a 3 day supply:  [x] Yes  [] No    What is the patient's preferred pharmacy: [facility]/PHARMACY #[id number] - [place], [place] - [facility] [address]EXT TO [facility] - [phone]  - [phone] FX

## 2021-06-28 ENCOUNTER — OFFICE VISIT (OUTPATIENT)
Dept: INTERNAL MEDICINE | Facility: CLINIC | Age: 41
End: 2021-06-28

## 2021-06-28 ENCOUNTER — TRANSCRIBE ORDERS (OUTPATIENT)
Dept: ADMINISTRATIVE | Facility: HOSPITAL | Age: 41
End: 2021-06-28

## 2021-06-28 ENCOUNTER — LAB (OUTPATIENT)
Dept: LAB | Facility: HOSPITAL | Age: 41
End: 2021-06-28

## 2021-06-28 VITALS
BODY MASS INDEX: 25.59 KG/M2 | HEART RATE: 78 BPM | TEMPERATURE: 97.1 F | SYSTOLIC BLOOD PRESSURE: 122 MMHG | OXYGEN SATURATION: 98 % | DIASTOLIC BLOOD PRESSURE: 80 MMHG | HEIGHT: 65 IN | WEIGHT: 153.6 LBS

## 2021-06-28 DIAGNOSIS — Z00.00 ROUTINE GENERAL MEDICAL EXAMINATION AT A HEALTH CARE FACILITY: ICD-10-CM

## 2021-06-28 DIAGNOSIS — N39.0 RECURRENT UTI: ICD-10-CM

## 2021-06-28 DIAGNOSIS — F41.9 ANXIETY: ICD-10-CM

## 2021-06-28 DIAGNOSIS — Z00.00 ROUTINE GENERAL MEDICAL EXAMINATION AT A HEALTH CARE FACILITY: Primary | ICD-10-CM

## 2021-06-28 DIAGNOSIS — Z12.31 VISIT FOR SCREENING MAMMOGRAM: Primary | ICD-10-CM

## 2021-06-28 DIAGNOSIS — M54.40 BILATERAL LOW BACK PAIN WITH SCIATICA, SCIATICA LATERALITY UNSPECIFIED, UNSPECIFIED CHRONICITY: ICD-10-CM

## 2021-06-28 LAB
BILIRUB BLD-MCNC: NEGATIVE MG/DL
CLARITY, POC: CLEAR
COLOR UR: YELLOW
DEPRECATED RDW RBC AUTO: 43.5 FL (ref 37–54)
ERYTHROCYTE [DISTWIDTH] IN BLOOD BY AUTOMATED COUNT: 11.8 % (ref 12.3–15.4)
GLUCOSE UR STRIP-MCNC: NEGATIVE MG/DL
HCT VFR BLD AUTO: 44.5 % (ref 34–46.6)
HGB BLD-MCNC: 14.7 G/DL (ref 12–15.9)
KETONES UR QL: NEGATIVE
LEUKOCYTE EST, POC: NEGATIVE
MCH RBC QN AUTO: 33 PG (ref 26.6–33)
MCHC RBC AUTO-ENTMCNC: 33 G/DL (ref 31.5–35.7)
MCV RBC AUTO: 100 FL (ref 79–97)
NITRITE UR-MCNC: NEGATIVE MG/ML
PH UR: 6 [PH] (ref 5–8)
PLATELET # BLD AUTO: 250 10*3/MM3 (ref 140–450)
PMV BLD AUTO: 12 FL (ref 6–12)
PROT UR STRIP-MCNC: ABNORMAL MG/DL
RBC # BLD AUTO: 4.45 10*6/MM3 (ref 3.77–5.28)
RBC # UR STRIP: NEGATIVE /UL
SP GR UR: 1.02 (ref 1–1.03)
UROBILINOGEN UR QL: NORMAL
WBC # BLD AUTO: 9.64 10*3/MM3 (ref 3.4–10.8)

## 2021-06-28 PROCEDURE — 82607 VITAMIN B-12: CPT

## 2021-06-28 PROCEDURE — 80050 GENERAL HEALTH PANEL: CPT

## 2021-06-28 PROCEDURE — 80061 LIPID PANEL: CPT

## 2021-06-28 PROCEDURE — 81003 URINALYSIS AUTO W/O SCOPE: CPT | Performed by: INTERNAL MEDICINE

## 2021-06-28 PROCEDURE — 84439 ASSAY OF FREE THYROXINE: CPT

## 2021-06-28 PROCEDURE — 82306 VITAMIN D 25 HYDROXY: CPT

## 2021-06-28 PROCEDURE — 99396 PREV VISIT EST AGE 40-64: CPT | Performed by: INTERNAL MEDICINE

## 2021-06-28 RX ORDER — HYDROCODONE BITARTRATE AND ACETAMINOPHEN 10; 325 MG/1; MG/1
1 TABLET ORAL EVERY 8 HOURS PRN
Qty: 90 TABLET | Refills: 0 | Status: CANCELLED | OUTPATIENT
Start: 2021-06-28

## 2021-06-28 RX ORDER — CYCLOBENZAPRINE HCL 10 MG
10 TABLET ORAL DAILY
Qty: 30 TABLET | Refills: 4 | Status: SHIPPED | OUTPATIENT
Start: 2021-06-28 | End: 2021-10-25 | Stop reason: SDUPTHER

## 2021-06-28 RX ORDER — BUSPIRONE HYDROCHLORIDE 10 MG/1
10 TABLET ORAL 3 TIMES DAILY
Qty: 270 TABLET | Refills: 1 | Status: SHIPPED | OUTPATIENT
Start: 2021-06-28 | End: 2022-03-08 | Stop reason: SDUPTHER

## 2021-06-28 RX ORDER — HYDROCODONE BITARTRATE AND ACETAMINOPHEN 10; 325 MG/1; MG/1
1 TABLET ORAL EVERY 8 HOURS PRN
Qty: 90 TABLET | Refills: 0 | Status: SHIPPED | OUTPATIENT
Start: 2021-06-28 | End: 2021-08-10 | Stop reason: SDUPTHER

## 2021-06-28 RX ORDER — NITROFURANTOIN MACROCRYSTALS 100 MG/1
CAPSULE ORAL
Qty: 20 CAPSULE | Refills: 1 | Status: SHIPPED | OUTPATIENT
Start: 2021-06-28 | End: 2022-03-08 | Stop reason: SDUPTHER

## 2021-06-29 LAB
25(OH)D3 SERPL-MCNC: 62.1 NG/ML (ref 30–100)
ALBUMIN SERPL-MCNC: 4.7 G/DL (ref 3.5–5.2)
ALBUMIN/GLOB SERPL: 2.2 G/DL
ALP SERPL-CCNC: 42 U/L (ref 39–117)
ALT SERPL W P-5'-P-CCNC: 19 U/L (ref 1–33)
ANION GAP SERPL CALCULATED.3IONS-SCNC: 10.3 MMOL/L (ref 5–15)
AST SERPL-CCNC: 21 U/L (ref 1–32)
BILIRUB SERPL-MCNC: 0.6 MG/DL (ref 0–1.2)
BUN SERPL-MCNC: 10 MG/DL (ref 6–20)
BUN/CREAT SERPL: 12.7 (ref 7–25)
CALCIUM SPEC-SCNC: 9 MG/DL (ref 8.6–10.5)
CHLORIDE SERPL-SCNC: 105 MMOL/L (ref 98–107)
CHOLEST SERPL-MCNC: 132 MG/DL (ref 0–200)
CO2 SERPL-SCNC: 22.7 MMOL/L (ref 22–29)
CREAT SERPL-MCNC: 0.79 MG/DL (ref 0.57–1)
GFR SERPL CREATININE-BSD FRML MDRD: 80 ML/MIN/1.73
GLOBULIN UR ELPH-MCNC: 2.1 GM/DL
GLUCOSE SERPL-MCNC: 91 MG/DL (ref 65–99)
HDLC SERPL-MCNC: 64 MG/DL (ref 40–60)
LDLC SERPL CALC-MCNC: 54 MG/DL (ref 0–100)
LDLC/HDLC SERPL: 0.86 {RATIO}
POTASSIUM SERPL-SCNC: 4.5 MMOL/L (ref 3.5–5.2)
PROT SERPL-MCNC: 6.8 G/DL (ref 6–8.5)
SODIUM SERPL-SCNC: 138 MMOL/L (ref 136–145)
T4 FREE SERPL-MCNC: 1.55 NG/DL (ref 0.93–1.7)
TRIGL SERPL-MCNC: 65 MG/DL (ref 0–150)
TSH SERPL DL<=0.05 MIU/L-ACNC: 2.09 UIU/ML (ref 0.27–4.2)
VIT B12 BLD-MCNC: 593 PG/ML (ref 211–946)
VLDLC SERPL-MCNC: 14 MG/DL (ref 5–40)

## 2021-07-09 ENCOUNTER — TELEPHONE (OUTPATIENT)
Dept: INTERNAL MEDICINE | Facility: CLINIC | Age: 41
End: 2021-07-09

## 2021-07-09 NOTE — TELEPHONE ENCOUNTER
Caller: Subha Patino    Relationship: Self    Best call back number: [phone]    Caller requesting test results: SELF     What test was performed: BLOOD WORK     When was the test performed: June 28, 2021     Where was the test performed: IN OFFICE     Additional notes:

## 2021-07-12 NOTE — TELEPHONE ENCOUNTER
Please let her know that a lab letter with the following message is being mailed to her.    Your labs including blood counts, electrolytes, cholesterol, thyroid, B12 and Vitamin D look good.    Please continue current regimen.

## 2021-08-10 DIAGNOSIS — M54.40 BILATERAL LOW BACK PAIN WITH SCIATICA, SCIATICA LATERALITY UNSPECIFIED, UNSPECIFIED CHRONICITY: ICD-10-CM

## 2021-08-10 NOTE — TELEPHONE ENCOUNTER
Caller: Subha Patino    Relationship: Self    Best call back number: [phone]    Medication needed:   Requested Prescriptions     Pending Prescriptions Disp Refills   • HYDROcodone-acetaminophen (NORCO)  MG per tablet 90 tablet 0     Sig: Take 1 tablet by mouth Every 8 (Eight) Hours As Needed for Moderate Pain .       When do you need the refill by: 08/11/2021    What additional details did the patient provide when requesting the medication: PATIENT IS DOWN TO ONE DAY ON HER MEDICATION     Does the patient have less than a 3 day supply:  [x] Yes  [] No    What is the patient's preferred pharmacy: [facility]/PHARMACY #[id number] - [place], [place] - [address] AT NEXT TO [facility] - [phone]  - [phone] FX

## 2021-08-10 NOTE — TELEPHONE ENCOUNTER
Last appointment: 06/28/21  Next appointment: 08/24/21  Sy:   UDS:06/24/20  CSA:     Last Refill: 06/28/21  Quantity: 90  Refills: 0

## 2021-08-11 RX ORDER — HYDROCODONE BITARTRATE AND ACETAMINOPHEN 10; 325 MG/1; MG/1
1 TABLET ORAL EVERY 8 HOURS PRN
Qty: 90 TABLET | Refills: 0 | Status: SHIPPED | OUTPATIENT
Start: 2021-08-11 | End: 2021-08-24 | Stop reason: SDUPTHER

## 2021-08-24 ENCOUNTER — OFFICE VISIT (OUTPATIENT)
Dept: INTERNAL MEDICINE | Facility: CLINIC | Age: 41
End: 2021-08-24

## 2021-08-24 VITALS
DIASTOLIC BLOOD PRESSURE: 80 MMHG | SYSTOLIC BLOOD PRESSURE: 116 MMHG | HEART RATE: 78 BPM | BODY MASS INDEX: 25.66 KG/M2 | HEIGHT: 65 IN | OXYGEN SATURATION: 99 % | WEIGHT: 154 LBS

## 2021-08-24 DIAGNOSIS — E53.8 VITAMIN B12 DEFICIENCY: ICD-10-CM

## 2021-08-24 DIAGNOSIS — M54.40 BILATERAL LOW BACK PAIN WITH SCIATICA, SCIATICA LATERALITY UNSPECIFIED, UNSPECIFIED CHRONICITY: ICD-10-CM

## 2021-08-24 PROCEDURE — 99213 OFFICE O/P EST LOW 20 MIN: CPT | Performed by: INTERNAL MEDICINE

## 2021-08-24 RX ORDER — HYDROCODONE BITARTRATE AND ACETAMINOPHEN 10; 325 MG/1; MG/1
1 TABLET ORAL EVERY 8 HOURS PRN
Qty: 90 TABLET | Refills: 0 | Status: SHIPPED | OUTPATIENT
Start: 2021-08-24 | End: 2021-10-11 | Stop reason: SDUPTHER

## 2021-08-24 NOTE — PROGRESS NOTES
Back Pain and Hand Pain    Subjective   Subha Patino is a 41 y.o. female is here today for follow-up.    History of Present Illness   Here for a follow up on her  and back pain.  Needs refills on her medication.  Has not received the Covid vaccination yet and not planning on getting it at this time.  Feels like she is hearing it from all directions and getting emotional when discussing about it.        Current Outpatient Medications:   •  busPIRone (BUSPAR) 10 MG tablet, Take 1 tablet by mouth 3 (Three) Times a Day., Disp: 270 tablet, Rfl: 1  •  Cyanocobalamin (Vitamin B-12) 5000 MCG sublingual tablet, Take 5,000 mcg by mouth Daily., Disp: 90 tablet, Rfl: 1  •  cyclobenzaprine (FLEXERIL) 10 MG tablet, Take 1 tablet by mouth Daily., Disp: 30 tablet, Rfl: 4  •  Diclofenac Sodium (VOLTAREN) 1 % gel gel, Apply 4 g topically to the appropriate area as directed 3 (Three) Times a Day., Disp: 100 g, Rfl: 3  •  HYDROcodone-acetaminophen (NORCO)  MG per tablet, Take 1 tablet by mouth Every 8 (Eight) Hours As Needed for Moderate Pain ., Disp: 90 tablet, Rfl: 0  •  nitrofurantoin (Macrodantin) 100 MG capsule, 1 PO prn intercourse, Disp: 20 capsule, Rfl: 1  •  spironolactone (ALDACTONE) 50 MG tablet, Take 2 tablets by mouth Daily., Disp: 60 tablet, Rfl: 11  •  Synthroid 112 MCG tablet, Take 1 tablet by mouth Daily. On empty stomach, Disp: 90 tablet, Rfl: 1      The following portions of the patient's history were reviewed and updated as appropriate: allergies, current medications, past family history, past medical history, past social history, past surgical history and problem list.    Review of Systems   Constitutional: Negative.  Negative for chills and fever.   HENT: Negative for ear discharge, ear pain, sinus pressure and sore throat.    Respiratory: Negative for cough, chest tightness and shortness of breath.    Cardiovascular: Negative for chest pain, palpitations and leg swelling.   Gastrointestinal: Negative for  "diarrhea, nausea and vomiting.   Musculoskeletal: Negative for arthralgias, back pain and myalgias.   Neurological: Negative for dizziness, syncope and headaches.   Psychiatric/Behavioral: Negative for confusion and sleep disturbance.       Objective   /80   Pulse 78   Ht 165.1 cm (65\")   Wt 69.9 kg (154 lb)   SpO2 99% Comment: ra  BMI 25.63 kg/m²   Physical Exam  Vitals and nursing note reviewed.   Constitutional:       Appearance: She is well-developed.   HENT:      Head: Normocephalic and atraumatic.      Right Ear: External ear normal.      Left Ear: External ear normal.      Mouth/Throat:      Pharynx: No oropharyngeal exudate.   Eyes:      Conjunctiva/sclera: Conjunctivae normal.      Pupils: Pupils are equal, round, and reactive to light.   Neck:      Thyroid: No thyromegaly.   Cardiovascular:      Rate and Rhythm: Normal rate and regular rhythm.      Pulses: Normal pulses.      Heart sounds: Normal heart sounds. No murmur heard.   No friction rub. No gallop.    Pulmonary:      Effort: Pulmonary effort is normal.      Breath sounds: Normal breath sounds.   Abdominal:      General: Bowel sounds are normal. There is no distension.      Palpations: Abdomen is soft.      Tenderness: There is no abdominal tenderness.   Musculoskeletal:      Cervical back: Neck supple.   Skin:     General: Skin is warm and dry.   Neurological:      Mental Status: She is alert and oriented to person, place, and time.      Cranial Nerves: No cranial nerve deficit.   Psychiatric:         Judgment: Judgment normal.           Results for orders placed or performed in visit on 06/28/21   CBC (No Diff)    Specimen: Blood   Result Value Ref Range    WBC 9.64 3.40 - 10.80 10*3/mm3    RBC 4.45 3.77 - 5.28 10*6/mm3    Hemoglobin 14.7 12.0 - 15.9 g/dL    Hematocrit 44.5 34.0 - 46.6 %    .0 (H) 79.0 - 97.0 fL    MCH 33.0 26.6 - 33.0 pg    MCHC 33.0 31.5 - 35.7 g/dL    RDW 11.8 (L) 12.3 - 15.4 %    RDW-SD 43.5 37.0 - 54.0 fl    " MPV 12.0 6.0 - 12.0 fL    Platelets 250 140 - 450 10*3/mm3   Comprehensive Metabolic Panel    Specimen: Blood   Result Value Ref Range    Glucose 91 65 - 99 mg/dL    BUN 10 6 - 20 mg/dL    Creatinine 0.79 0.57 - 1.00 mg/dL    Sodium 138 136 - 145 mmol/L    Potassium 4.5 3.5 - 5.2 mmol/L    Chloride 105 98 - 107 mmol/L    CO2 22.7 22.0 - 29.0 mmol/L    Calcium 9.0 8.6 - 10.5 mg/dL    Total Protein 6.8 6.0 - 8.5 g/dL    Albumin 4.70 3.50 - 5.20 g/dL    ALT (SGPT) 19 1 - 33 U/L    AST (SGOT) 21 1 - 32 U/L    Alkaline Phosphatase 42 39 - 117 U/L    Total Bilirubin 0.6 0.0 - 1.2 mg/dL    eGFR Non African Amer 80 >60 mL/min/1.73    Globulin 2.1 gm/dL    A/G Ratio 2.2 g/dL    BUN/Creatinine Ratio 12.7 7.0 - 25.0    Anion Gap 10.3 5.0 - 15.0 mmol/L   Lipid Panel    Specimen: Blood   Result Value Ref Range    Total Cholesterol 132 0 - 200 mg/dL    Triglycerides 65 0 - 150 mg/dL    HDL Cholesterol 64 (H) 40 - 60 mg/dL    LDL Cholesterol  54 0 - 100 mg/dL    VLDL Cholesterol 14 5 - 40 mg/dL    LDL/HDL Ratio 0.86    TSH    Specimen: Blood   Result Value Ref Range    TSH 2.090 0.270 - 4.200 uIU/mL   Vitamin D 25 Hydroxy    Specimen: Blood   Result Value Ref Range    25 Hydroxy, Vitamin D 62.1 30.0 - 100.0 ng/ml   T4, Free    Specimen: Blood   Result Value Ref Range    Free T4 1.55 0.93 - 1.70 ng/dL   Vitamin B12    Specimen: Blood   Result Value Ref Range    Vitamin B-12 593 211 - 946 pg/mL             Assessment/Plan   Diagnoses and all orders for this visit:    Vitamin B12 deficiency  -     Cyanocobalamin (Vitamin B-12) 5000 MCG sublingual tablet; Take 5,000 mcg by mouth Daily.    Bilateral low back pain with sciatica, sciatica laterality unspecified, unspecified chronicity  -     HYDROcodone-acetaminophen (NORCO)  MG per tablet; Take 1 tablet by mouth Every 8 (Eight) Hours As Needed for Moderate Pain .    Long discussion and counseling to get her Covid vaccination especially due to being a high risk profession.  She is  a hairdresser and deals with clients/public.  She is very reluctant and ended up in tears because she felt constantly on the receiving end on this issue.    The patient has read and signed the [facility] Controlled Substance Contract.  I will continue to see patient for regular follow up appointments.  They are well controlled on their medication.  MARJORIE has been reviewed by me and is updated every 3 months. The patient is aware of the potential for addiction and dependence.           Return in about 2 months (around 10/24/2021) for Next scheduled follow up.    Electronically signed by:    Catrina Todd MD

## 2021-08-30 NOTE — PROGRESS NOTES
Patient needs a follow-up in 2 months, I accidentally marked as 2 years therefore it was not scheduled at her visit.  Please have someone call to schedule it.  Thank you

## 2021-09-13 ENCOUNTER — HOSPITAL ENCOUNTER (OUTPATIENT)
Dept: MAMMOGRAPHY | Facility: HOSPITAL | Age: 41
Discharge: HOME | End: 2021-09-13
Admitting: INTERNAL MEDICINE

## 2021-09-13 DIAGNOSIS — Z12.31 VISIT FOR SCREENING MAMMOGRAM: ICD-10-CM

## 2021-09-13 PROCEDURE — 77063 BREAST TOMOSYNTHESIS BI: CPT | Performed by: RADIOLOGY

## 2021-09-13 PROCEDURE — 77063 BREAST TOMOSYNTHESIS BI: CPT

## 2021-09-13 PROCEDURE — 77067 SCR MAMMO BI INCL CAD: CPT | Performed by: RADIOLOGY

## 2021-09-13 PROCEDURE — 77067 SCR MAMMO BI INCL CAD: CPT

## 2021-09-16 DIAGNOSIS — J30.2 SEASONAL ALLERGIC RHINITIS: ICD-10-CM

## 2021-09-16 RX ORDER — LORATADINE 10 MG/1
TABLET ORAL
Qty: 90 TABLET | Refills: 1 | OUTPATIENT
Start: 2021-09-16

## 2021-10-05 ENCOUNTER — TELEPHONE (OUTPATIENT)
Dept: ENDOCRINOLOGY | Facility: CLINIC | Age: 41
End: 2021-10-05

## 2021-10-11 ENCOUNTER — TELEPHONE (OUTPATIENT)
Dept: INTERNAL MEDICINE | Facility: CLINIC | Age: 41
End: 2021-10-11

## 2021-10-11 DIAGNOSIS — M54.40 BILATERAL LOW BACK PAIN WITH SCIATICA, SCIATICA LATERALITY UNSPECIFIED, UNSPECIFIED CHRONICITY: ICD-10-CM

## 2021-10-11 RX ORDER — HYDROCODONE BITARTRATE AND ACETAMINOPHEN 10; 325 MG/1; MG/1
1 TABLET ORAL EVERY 8 HOURS PRN
Qty: 90 TABLET | Refills: 0 | Status: SHIPPED | OUTPATIENT
Start: 2021-10-11 | End: 2021-10-25 | Stop reason: SDUPTHER

## 2021-10-11 NOTE — TELEPHONE ENCOUNTER
Caller: Subha Patino    Relationship: Self      Medication requested (name and dosage):   HYDROcodone-acetaminophen (NORCO)  MG per tablet    Pharmacy where request should be sent:    [facility]/pharmacy #[id number] - [place], [place] - [address] AT [address]EXT TO [facility] - [phone]  - [phone]         Additional details provided by patient:     Best call back number: [phone]    Does the patient have less than a 3 day supply:  [x] Yes  [] No    Gissel Carbajal Rep   10/11/21 11:50 EDT

## 2021-10-11 NOTE — TELEPHONE ENCOUNTER
Last Office Visit: 8/24/21  Next Office Visit: 10/25/21    Labs completed in past 6 months? no  Labs completed in past year? no    Last Refill Date: 8/24/21  Quantity: 90  Refills:0    Pharmacy:     Please review pended refill request for any changes needed on refills or quantities. Thank you!

## 2021-10-20 NOTE — TELEPHONE ENCOUNTER
PHONE CALL FROM PATIENT.  STATES HYDROCODONE NEEDS PRIOR AUTHORIZATION.      PLEASE CALL [phone]

## 2021-10-22 ENCOUNTER — TELEPHONE (OUTPATIENT)
Dept: INTERNAL MEDICINE | Facility: CLINIC | Age: 41
End: 2021-10-22

## 2021-10-22 NOTE — TELEPHONE ENCOUNTER
PA sent, LM for pt that this has been done and to use [facility] if it does not get approved prior to her needing to fill.  Office number given for any questions.

## 2021-10-22 NOTE — TELEPHONE ENCOUNTER
Caller: Subha Patino    Relationship: Self      Medication requested (name and dosage):    HYDROcodone-acetaminophen (NORCO)  MG per tablet         Requested Prescriptions:        Pharmacy where request should be sent: [facility]/pharmacy #[id number] - [place], [place] - [address] AT [address]EXT TO [facility] - [phone]  - [phone]   [phone]    Additional details provided by patient: PATIENT STATED THAT SHE WAS TOLD A PRIOR AUTHORIZATION IS STILL NEEDED.    PATIENT WILL BE OUT OVER THE WEEKEND.    Best call back number: [phone]    Does the patient have less than a 3 day supply:  [x] Yes  [] No    Gissel Puente Rep   10/22/21 10:35 EDT

## 2021-10-25 ENCOUNTER — OFFICE VISIT (OUTPATIENT)
Dept: INTERNAL MEDICINE | Facility: CLINIC | Age: 41
End: 2021-10-25

## 2021-10-25 VITALS
WEIGHT: 152.2 LBS | HEART RATE: 73 BPM | OXYGEN SATURATION: 98 % | HEIGHT: 65 IN | DIASTOLIC BLOOD PRESSURE: 66 MMHG | BODY MASS INDEX: 25.36 KG/M2 | SYSTOLIC BLOOD PRESSURE: 104 MMHG

## 2021-10-25 DIAGNOSIS — R13.10 DYSPHAGIA, UNSPECIFIED TYPE: Primary | ICD-10-CM

## 2021-10-25 DIAGNOSIS — M54.40 BILATERAL LOW BACK PAIN WITH SCIATICA, SCIATICA LATERALITY UNSPECIFIED, UNSPECIFIED CHRONICITY: ICD-10-CM

## 2021-10-25 PROCEDURE — 99214 OFFICE O/P EST MOD 30 MIN: CPT | Performed by: INTERNAL MEDICINE

## 2021-10-25 RX ORDER — HYDROCODONE BITARTRATE AND ACETAMINOPHEN 10; 325 MG/1; MG/1
1 TABLET ORAL EVERY 8 HOURS PRN
Qty: 90 TABLET | Refills: 0 | Status: SHIPPED | OUTPATIENT
Start: 2021-10-25 | End: 2021-11-29 | Stop reason: SDUPTHER

## 2021-10-25 RX ORDER — CYCLOBENZAPRINE HCL 10 MG
10 TABLET ORAL DAILY
Qty: 30 TABLET | Refills: 4 | Status: SHIPPED | OUTPATIENT
Start: 2021-10-25 | End: 2022-03-08 | Stop reason: SDUPTHER

## 2021-10-25 RX ORDER — FAMOTIDINE 40 MG/1
40 TABLET, FILM COATED ORAL DAILY
Qty: 30 TABLET | Refills: 5 | Status: SHIPPED | OUTPATIENT
Start: 2021-10-25 | End: 2022-03-08 | Stop reason: SDUPTHER

## 2021-10-25 NOTE — PROGRESS NOTES
Back Pain and Heartburn (feels like a sock is in her throat at times)    Subjective   Subha Patino is a 41 y.o. female is here today for follow-up.    History of Present Illness   Feels like she chokes when she eats foods like bread etc. Drinks a lot of fluid.  hydrocodone was denied  Having some memory issues.      Current Outpatient Medications:   •  busPIRone (BUSPAR) 10 MG tablet, Take 1 tablet by mouth 3 (Three) Times a Day., Disp: 270 tablet, Rfl: 1  •  Cyanocobalamin (Vitamin B-12) 5000 MCG sublingual tablet, Take 5,000 mcg by mouth Daily., Disp: 90 tablet, Rfl: 1  •  cyclobenzaprine (FLEXERIL) 10 MG tablet, Take 1 tablet by mouth Daily., Disp: 30 tablet, Rfl: 4  •  Diclofenac Sodium (VOLTAREN) 1 % gel gel, Apply 4 g topically to the appropriate area as directed 3 (Three) Times a Day., Disp: 100 g, Rfl: 3  •  HYDROcodone-acetaminophen (NORCO)  MG per tablet, Take 1 tablet by mouth Every 8 (Eight) Hours As Needed for Moderate Pain ., Disp: 90 tablet, Rfl: 0  •  nitrofurantoin (Macrodantin) 100 MG capsule, 1 PO prn intercourse, Disp: 20 capsule, Rfl: 1  •  spironolactone (ALDACTONE) 50 MG tablet, Take 2 tablets by mouth Daily., Disp: 60 tablet, Rfl: 11  •  Synthroid 112 MCG tablet, Take 1 tablet by mouth Daily. On empty stomach, Disp: 90 tablet, Rfl: 1  •  famotidine (Pepcid) 40 MG tablet, Take 1 tablet by mouth Daily., Disp: 30 tablet, Rfl: 5  •  Naloxone HCl 0.4 MG/0.4ML solution auto-injector, Inject 0.4 mL as directed 1 (One) Time As Needed (confusion) for up to 1 dose., Disp: 0.4 mL, Rfl: 1      The following portions of the patient's history were reviewed and updated as appropriate: allergies, current medications, past family history, past medical history, past social history, past surgical history and problem list.    Review of Systems   Constitutional: Positive for fatigue. Negative for chills and fever.   HENT: Negative for ear discharge, ear pain, sinus pressure and sore throat.   "  Respiratory: Negative for cough, chest tightness and shortness of breath.    Cardiovascular: Negative for chest pain, palpitations and leg swelling.   Gastrointestinal: Negative for diarrhea, nausea and vomiting.   Musculoskeletal: Negative for arthralgias, back pain and myalgias.   Neurological: Negative for dizziness, syncope and headaches.   Psychiatric/Behavioral: Positive for confusion. Negative for sleep disturbance.       Objective   /66   Pulse 73   Ht 165.1 cm (65\")   Wt 69 kg (152 lb 3.2 oz)   SpO2 98% Comment: ra  BMI 25.33 kg/m²   Physical Exam  Vitals and nursing note reviewed.   Constitutional:       Appearance: She is well-developed.   HENT:      Head: Normocephalic and atraumatic.      Mouth/Throat:      Pharynx: No oropharyngeal exudate.   Eyes:      Conjunctiva/sclera: Conjunctivae normal.      Pupils: Pupils are equal, round, and reactive to light.   Neck:      Thyroid: No thyromegaly.   Cardiovascular:      Rate and Rhythm: Normal rate and regular rhythm.      Pulses: Normal pulses.      Heart sounds: Normal heart sounds. No murmur heard.  No friction rub. No gallop.    Pulmonary:      Effort: Pulmonary effort is normal.      Breath sounds: Normal breath sounds.   Abdominal:      General: Bowel sounds are normal. There is no distension.      Palpations: Abdomen is soft.      Tenderness: There is abdominal tenderness.   Musculoskeletal:         General: No swelling or deformity.      Cervical back: Neck supple.   Skin:     General: Skin is warm and dry.   Neurological:      Mental Status: She is alert and oriented to person, place, and time.      Cranial Nerves: No cranial nerve deficit.   Psychiatric:         Judgment: Judgment normal.           Results for orders placed or performed in visit on 06/28/21   CBC (No Diff)    Specimen: Blood   Result Value Ref Range    WBC 9.64 3.40 - 10.80 10*3/mm3    RBC 4.45 3.77 - 5.28 10*6/mm3    Hemoglobin 14.7 12.0 - 15.9 g/dL    Hematocrit 44.5 " 34.0 - 46.6 %    .0 (H) 79.0 - 97.0 fL    MCH 33.0 26.6 - 33.0 pg    MCHC 33.0 31.5 - 35.7 g/dL    RDW 11.8 (L) 12.3 - 15.4 %    RDW-SD 43.5 37.0 - 54.0 fl    MPV 12.0 6.0 - 12.0 fL    Platelets 250 140 - 450 10*3/mm3   Comprehensive Metabolic Panel    Specimen: Blood   Result Value Ref Range    Glucose 91 65 - 99 mg/dL    BUN 10 6 - 20 mg/dL    Creatinine 0.79 0.57 - 1.00 mg/dL    Sodium 138 136 - 145 mmol/L    Potassium 4.5 3.5 - 5.2 mmol/L    Chloride 105 98 - 107 mmol/L    CO2 22.7 22.0 - 29.0 mmol/L    Calcium 9.0 8.6 - 10.5 mg/dL    Total Protein 6.8 6.0 - 8.5 g/dL    Albumin 4.70 3.50 - 5.20 g/dL    ALT (SGPT) 19 1 - 33 U/L    AST (SGOT) 21 1 - 32 U/L    Alkaline Phosphatase 42 39 - 117 U/L    Total Bilirubin 0.6 0.0 - 1.2 mg/dL    eGFR Non African Amer 80 >60 mL/min/1.73    Globulin 2.1 gm/dL    A/G Ratio 2.2 g/dL    BUN/Creatinine Ratio 12.7 7.0 - 25.0    Anion Gap 10.3 5.0 - 15.0 mmol/L   Lipid Panel    Specimen: Blood   Result Value Ref Range    Total Cholesterol 132 0 - 200 mg/dL    Triglycerides 65 0 - 150 mg/dL    HDL Cholesterol 64 (H) 40 - 60 mg/dL    LDL Cholesterol  54 0 - 100 mg/dL    VLDL Cholesterol 14 5 - 40 mg/dL    LDL/HDL Ratio 0.86    TSH    Specimen: Blood   Result Value Ref Range    TSH 2.090 0.270 - 4.200 uIU/mL   Vitamin D 25 Hydroxy    Specimen: Blood   Result Value Ref Range    25 Hydroxy, Vitamin D 62.1 30.0 - 100.0 ng/ml   T4, Free    Specimen: Blood   Result Value Ref Range    Free T4 1.55 0.93 - 1.70 ng/dL   Vitamin B12    Specimen: Blood   Result Value Ref Range    Vitamin B-12 593 211 - 946 pg/mL             Assessment/Plan   Diagnoses and all orders for this visit:    Dysphagia, unspecified type  Comments:  cinb, will proceed with egd.    Bilateral low back pain with sciatica, sciatica laterality unspecified, unspecified chronicity  -     cyclobenzaprine (FLEXERIL) 10 MG tablet; Take 1 tablet by mouth Daily.  -     famotidine (Pepcid) 40 MG tablet; Take 1 tablet by mouth  Daily.  -     Naloxone HCl 0.4 MG/0.4ML solution auto-injector; Inject 0.4 mL as directed 1 (One) Time As Needed (confusion) for up to 1 dose.  -     HYDROcodone-acetaminophen (NORCO)  MG per tablet; Take 1 tablet by mouth Every 8 (Eight) Hours As Needed for Moderate Pain .    she will use [facility] to fill her pain meds.       The patient has read and signed the [facility] Controlled Substance Contract.  I will continue to see patient for regular follow up appointments.  They are well controlled on their medication.  MARJORIE has been reviewed by me and is updated every 3 months. The patient is aware of the potential for addiction and dependence.        Return in about 2 months (around 12/25/2021) for Next scheduled follow up.    Electronically signed by:    Catrina Todd MD

## 2021-11-18 DIAGNOSIS — L70.9 ACNE, UNSPECIFIED ACNE TYPE: ICD-10-CM

## 2021-11-18 RX ORDER — SPIRONOLACTONE 50 MG/1
TABLET, FILM COATED ORAL
Qty: 180 TABLET | Refills: 0 | Status: SHIPPED | OUTPATIENT
Start: 2021-11-18 | End: 2021-12-20

## 2021-11-29 ENCOUNTER — TELEPHONE (OUTPATIENT)
Dept: INTERNAL MEDICINE | Facility: CLINIC | Age: 41
End: 2021-11-29

## 2021-11-29 DIAGNOSIS — M54.40 BILATERAL LOW BACK PAIN WITH SCIATICA, SCIATICA LATERALITY UNSPECIFIED, UNSPECIFIED CHRONICITY: ICD-10-CM

## 2021-11-29 RX ORDER — HYDROCODONE BITARTRATE AND ACETAMINOPHEN 10; 325 MG/1; MG/1
1 TABLET ORAL EVERY 8 HOURS PRN
Qty: 90 TABLET | Refills: 0 | Status: SHIPPED | OUTPATIENT
Start: 2021-11-29 | End: 2021-12-20 | Stop reason: SDUPTHER

## 2021-11-29 NOTE — TELEPHONE ENCOUNTER
Caller: Subha Patino    Relationship: Self    Best call back number: [phone] (H)    Requested Prescriptions:   HYDROcodone-acetaminophen (NORCO)  MG per tablet  1 tablet, Every 8 Hours PRN 0 ordered         Summary: Take 1 tablet by mouth Every 8 (Eight) Hours As Needed for Moderate Pain ., Starting Mon 10/25/2021, Normal     Dose, Route, Frequency: 1 tablet, Oral, Every 8 Hours PRN            Pharmacy where request should be sent:    [facility]/pharmacy #[id number] - [place], [place] - [facility] [address]EXT TO [facility] - [phone]  - [phone] FX      Additional details provided by patient:   PATIENT STATES THAT SHE HAS LESS THAN 3 DAY SUPPLY ON HAND.   PATIENT IS ALSO REQUESTING THAT THE PHARMACY BE NOTIFIED THAT SHE WILL BE PAYING FOR IT OUT OF POCKET AS IT IS NO LONGER COVERED BY PATIENT'S INSURANCE.     Does the patient have less than a 3 day supply:  [x] Yes  [] No    Gissel Mcgraw Rep   11/29/21 10:10 EST     PATIENT HAS BEEN ADVISED THAT THIS REQUEST HAS BEEN MARKED AS A HIGH PRIORITY, PLEASE ALLOW 48 HOURS FOR OUR CLINICAL TEAM TO FOLLOW UP ON THIS REQUEST.

## 2021-11-29 NOTE — TELEPHONE ENCOUNTER
Last Office Visit:10/25/21   Next Office Visit:12/20/21    Labs completed in past 6 months? no  Labs completed in past year? no    Last Refill Date: 10/25/21  Quantity:90  Refills:0    Pharmacy:     Please review pended refill request for any changes needed on refills or quantities. Thank you!

## 2021-12-20 ENCOUNTER — OFFICE VISIT (OUTPATIENT)
Dept: INTERNAL MEDICINE | Facility: CLINIC | Age: 41
End: 2021-12-20

## 2021-12-20 VITALS
SYSTOLIC BLOOD PRESSURE: 110 MMHG | WEIGHT: 151 LBS | OXYGEN SATURATION: 99 % | HEIGHT: 65 IN | DIASTOLIC BLOOD PRESSURE: 70 MMHG | HEART RATE: 60 BPM | BODY MASS INDEX: 25.16 KG/M2

## 2021-12-20 DIAGNOSIS — M54.40 BILATERAL LOW BACK PAIN WITH SCIATICA, SCIATICA LATERALITY UNSPECIFIED, UNSPECIFIED CHRONICITY: ICD-10-CM

## 2021-12-20 DIAGNOSIS — L70.9 ACNE, UNSPECIFIED ACNE TYPE: ICD-10-CM

## 2021-12-20 PROCEDURE — 99214 OFFICE O/P EST MOD 30 MIN: CPT | Performed by: INTERNAL MEDICINE

## 2021-12-20 NOTE — PROGRESS NOTES
Hypothyroidism    Subjective   Subha Patino is a 41 y.o. female is here today for follow-up.    History of Present Illness   Doing okay, except for fatigue. Has endo appointment next week.    pepcid is helping but still has some swllowing issues.      Current Outpatient Medications:   •  busPIRone (BUSPAR) 10 MG tablet, Take 1 tablet by mouth 3 (Three) Times a Day., Disp: 270 tablet, Rfl: 1  •  Cyanocobalamin (Vitamin B-12) 5000 MCG sublingual tablet, Take 5,000 mcg by mouth Daily., Disp: 90 tablet, Rfl: 1  •  cyclobenzaprine (FLEXERIL) 10 MG tablet, Take 1 tablet by mouth Daily., Disp: 30 tablet, Rfl: 4  •  Diclofenac Sodium (VOLTAREN) 1 % gel gel, Apply 4 g topically to the appropriate area as directed 3 (Three) Times a Day., Disp: 100 g, Rfl: 3  •  famotidine (Pepcid) 40 MG tablet, Take 1 tablet by mouth Daily., Disp: 30 tablet, Rfl: 5  •  HYDROcodone-acetaminophen (NORCO)  MG per tablet, Take 1 tablet by mouth Every 8 (Eight) Hours As Needed for Moderate Pain ., Disp: 90 tablet, Rfl: 0  •  Naloxone HCl 0.4 MG/0.4ML solution auto-injector, Inject 0.4 mL as directed 1 (One) Time As Needed (confusion) for up to 1 dose., Disp: 0.4 mL, Rfl: 1  •  nitrofurantoin (Macrodantin) 100 MG capsule, 1 PO prn intercourse, Disp: 20 capsule, Rfl: 1  •  spironolactone (ALDACTONE) 50 MG tablet, Take 2 tablets by mouth Daily As Needed (acne)., Disp: 180 tablet, Rfl: 0  •  Synthroid 112 MCG tablet, Take 1 tablet by mouth Daily. On empty stomach, Disp: 90 tablet, Rfl: 1      The following portions of the patient's history were reviewed and updated as appropriate: allergies, current medications, past family history, past medical history, past social history, past surgical history and problem list.    Review of Systems   Constitutional: Positive for fatigue. Negative for chills and fever.   HENT: Negative for ear discharge, ear pain, sinus pressure and sore throat.    Respiratory: Negative for cough, chest tightness and  "shortness of breath.    Cardiovascular: Negative for chest pain, palpitations and leg swelling.   Gastrointestinal: Negative for diarrhea, nausea and vomiting.   Musculoskeletal: Positive for back pain. Negative for arthralgias and myalgias.   Neurological: Negative for dizziness, syncope and headaches.   Psychiatric/Behavioral: Negative for confusion and sleep disturbance.       Objective   /70   Pulse 60   Ht 165.1 cm (65\")   Wt 68.5 kg (151 lb)   SpO2 99%   BMI 25.13 kg/m²   Physical Exam  Vitals and nursing note reviewed.   Constitutional:       Appearance: She is well-developed.   HENT:      Head: Normocephalic and atraumatic.      Right Ear: External ear normal.      Left Ear: External ear normal.      Mouth/Throat:      Pharynx: No oropharyngeal exudate.   Eyes:      Conjunctiva/sclera: Conjunctivae normal.      Pupils: Pupils are equal, round, and reactive to light.   Neck:      Thyroid: No thyromegaly.   Cardiovascular:      Rate and Rhythm: Normal rate and regular rhythm.      Pulses: Normal pulses.      Heart sounds: Normal heart sounds. No murmur heard.  No friction rub. No gallop.    Pulmonary:      Effort: Pulmonary effort is normal.      Breath sounds: Normal breath sounds.   Abdominal:      General: Bowel sounds are normal. There is no distension.      Palpations: Abdomen is soft.      Tenderness: There is no abdominal tenderness.   Musculoskeletal:         General: Tenderness present.      Cervical back: Neck supple.   Skin:     General: Skin is warm and dry.   Neurological:      Mental Status: She is alert and oriented to person, place, and time.      Cranial Nerves: No cranial nerve deficit.   Psychiatric:         Judgment: Judgment normal.           Results for orders placed or performed in visit on 06/28/21   CBC (No Diff)    Specimen: Blood   Result Value Ref Range    WBC 9.64 3.40 - 10.80 10*3/mm3    RBC 4.45 3.77 - 5.28 10*6/mm3    Hemoglobin 14.7 12.0 - 15.9 g/dL    Hematocrit " 44.5 34.0 - 46.6 %    .0 (H) 79.0 - 97.0 fL    MCH 33.0 26.6 - 33.0 pg    MCHC 33.0 31.5 - 35.7 g/dL    RDW 11.8 (L) 12.3 - 15.4 %    RDW-SD 43.5 37.0 - 54.0 fl    MPV 12.0 6.0 - 12.0 fL    Platelets 250 140 - 450 10*3/mm3   Comprehensive Metabolic Panel    Specimen: Blood   Result Value Ref Range    Glucose 91 65 - 99 mg/dL    BUN 10 6 - 20 mg/dL    Creatinine 0.79 0.57 - 1.00 mg/dL    Sodium 138 136 - 145 mmol/L    Potassium 4.5 3.5 - 5.2 mmol/L    Chloride 105 98 - 107 mmol/L    CO2 22.7 22.0 - 29.0 mmol/L    Calcium 9.0 8.6 - 10.5 mg/dL    Total Protein 6.8 6.0 - 8.5 g/dL    Albumin 4.70 3.50 - 5.20 g/dL    ALT (SGPT) 19 1 - 33 U/L    AST (SGOT) 21 1 - 32 U/L    Alkaline Phosphatase 42 39 - 117 U/L    Total Bilirubin 0.6 0.0 - 1.2 mg/dL    eGFR Non African Amer 80 >60 mL/min/1.73    Globulin 2.1 gm/dL    A/G Ratio 2.2 g/dL    BUN/Creatinine Ratio 12.7 7.0 - 25.0    Anion Gap 10.3 5.0 - 15.0 mmol/L   Lipid Panel    Specimen: Blood   Result Value Ref Range    Total Cholesterol 132 0 - 200 mg/dL    Triglycerides 65 0 - 150 mg/dL    HDL Cholesterol 64 (H) 40 - 60 mg/dL    LDL Cholesterol  54 0 - 100 mg/dL    VLDL Cholesterol 14 5 - 40 mg/dL    LDL/HDL Ratio 0.86    TSH    Specimen: Blood   Result Value Ref Range    TSH 2.090 0.270 - 4.200 uIU/mL   Vitamin D 25 Hydroxy    Specimen: Blood   Result Value Ref Range    25 Hydroxy, Vitamin D 62.1 30.0 - 100.0 ng/ml   T4, Free    Specimen: Blood   Result Value Ref Range    Free T4 1.55 0.93 - 1.70 ng/dL   Vitamin B12    Specimen: Blood   Result Value Ref Range    Vitamin B-12 593 211 - 946 pg/mL             Assessment/Plan   Diagnoses and all orders for this visit:    Bilateral low back pain with sciatica, sciatica laterality unspecified, unspecified chronicity  Comments:  well controlled on current regimen.  Orders:  -     HYDROcodone-acetaminophen (NORCO)  MG per tablet; Take 1 tablet by mouth Every 8 (Eight) Hours As Needed for Moderate Pain .    Acne,  unspecified acne type  -     Comprehensive Metabolic Panel; Future  -     spironolactone (ALDACTONE) 50 MG tablet; Take 2 tablets by mouth Daily As Needed (acne).      Adv. To call If ready to  Get EGD.     The patient has read and signed the [facility] Controlled Substance Contract.  I will continue to see patient for regular follow up appointments.  They are well controlled on their medication.  MARJORIE has been reviewed by me and is updated every 3 months. The patient is aware of the potential for addiction and dependence.        Return in about 2 months (around 2/20/2022) for Next scheduled follow up.    Electronically signed by:    Catrina Todd MD

## 2021-12-25 RX ORDER — HYDROCODONE BITARTRATE AND ACETAMINOPHEN 10; 325 MG/1; MG/1
1 TABLET ORAL EVERY 8 HOURS PRN
Qty: 90 TABLET | Refills: 0 | Status: SHIPPED | OUTPATIENT
Start: 2021-12-25 | End: 2022-01-28 | Stop reason: SDUPTHER

## 2021-12-25 RX ORDER — SPIRONOLACTONE 50 MG/1
100 TABLET, FILM COATED ORAL DAILY PRN
Qty: 180 TABLET | Refills: 0
Start: 2021-12-25 | End: 2022-03-08 | Stop reason: SDUPTHER

## 2022-01-03 RX ORDER — LEVOTHYROXINE SODIUM 112 MCG
112 TABLET ORAL DAILY
Qty: 90 TABLET | Refills: 0 | Status: SHIPPED | OUTPATIENT
Start: 2022-01-03 | End: 2022-03-28

## 2022-01-20 ENCOUNTER — OFFICE VISIT (OUTPATIENT)
Dept: ENDOCRINOLOGY | Facility: CLINIC | Age: 42
End: 2022-01-20

## 2022-01-20 ENCOUNTER — LAB (OUTPATIENT)
Dept: LAB | Facility: HOSPITAL | Age: 42
End: 2022-01-20

## 2022-01-20 VITALS
DIASTOLIC BLOOD PRESSURE: 74 MMHG | SYSTOLIC BLOOD PRESSURE: 118 MMHG | WEIGHT: 151.8 LBS | OXYGEN SATURATION: 100 % | HEIGHT: 65 IN | BODY MASS INDEX: 25.29 KG/M2 | HEART RATE: 70 BPM

## 2022-01-20 DIAGNOSIS — L70.9 ACNE, UNSPECIFIED ACNE TYPE: ICD-10-CM

## 2022-01-20 DIAGNOSIS — E03.9 ACQUIRED HYPOTHYROIDISM: Primary | ICD-10-CM

## 2022-01-20 LAB
ALBUMIN SERPL-MCNC: 5 G/DL (ref 3.5–5.2)
ALBUMIN/GLOB SERPL: 2.4 G/DL
ALP SERPL-CCNC: 44 U/L (ref 39–117)
ALT SERPL W P-5'-P-CCNC: 18 U/L (ref 1–33)
ANION GAP SERPL CALCULATED.3IONS-SCNC: 7.9 MMOL/L (ref 5–15)
AST SERPL-CCNC: 22 U/L (ref 1–32)
BILIRUB SERPL-MCNC: 0.7 MG/DL (ref 0–1.2)
BUN SERPL-MCNC: 9 MG/DL (ref 6–20)
BUN/CREAT SERPL: 11.3 (ref 7–25)
CALCIUM SPEC-SCNC: 9.6 MG/DL (ref 8.6–10.5)
CHLORIDE SERPL-SCNC: 103 MMOL/L (ref 98–107)
CO2 SERPL-SCNC: 27.1 MMOL/L (ref 22–29)
CREAT SERPL-MCNC: 0.8 MG/DL (ref 0.57–1)
GFR SERPL CREATININE-BSD FRML MDRD: 79 ML/MIN/1.73
GLOBULIN UR ELPH-MCNC: 2.1 GM/DL
GLUCOSE SERPL-MCNC: 86 MG/DL (ref 65–99)
POTASSIUM SERPL-SCNC: 4.4 MMOL/L (ref 3.5–5.2)
PROT SERPL-MCNC: 7.1 G/DL (ref 6–8.5)
SODIUM SERPL-SCNC: 138 MMOL/L (ref 136–145)
T4 FREE SERPL-MCNC: 2.28 NG/DL (ref 0.93–1.7)
TSH SERPL DL<=0.05 MIU/L-ACNC: 1.12 UIU/ML (ref 0.27–4.2)

## 2022-01-20 PROCEDURE — 80053 COMPREHEN METABOLIC PANEL: CPT | Performed by: INTERNAL MEDICINE

## 2022-01-20 PROCEDURE — 84439 ASSAY OF FREE THYROXINE: CPT | Performed by: INTERNAL MEDICINE

## 2022-01-20 PROCEDURE — 99213 OFFICE O/P EST LOW 20 MIN: CPT | Performed by: INTERNAL MEDICINE

## 2022-01-20 PROCEDURE — 84443 ASSAY THYROID STIM HORMONE: CPT | Performed by: INTERNAL MEDICINE

## 2022-01-20 NOTE — PROGRESS NOTES
Chief complaint  Hypothyroidism (Follow up)    Subjective   Subha Patino is a 41 y.o. female is here today for follow-up.    Hypothyroidism:  Current treatment includes Synthroid 100 mcg.  It was changed to levothyroxine.   Previous meds: generic levothyroxine triggered palpitations, Tirosint d/c due to cost. Synthroid seem to work best for her.   U/s thyroid in 2013 - small multiple nodules with largest measuring 3 mm.     Sprinoloactone started for acne and effective. She is taking 100 mg daily off and o n when needed for breakouts.   Vit D deficiency - takes supplements.   C/o fatigue and lethargy, joint stiffness and pain.    Medications    Current Outpatient Medications:   •  busPIRone (BUSPAR) 10 MG tablet, Take 1 tablet by mouth 3 (Three) Times a Day., Disp: 270 tablet, Rfl: 1  •  Cyanocobalamin (Vitamin B-12) 5000 MCG sublingual tablet, Take 5,000 mcg by mouth Daily., Disp: 90 tablet, Rfl: 1  •  cyclobenzaprine (FLEXERIL) 10 MG tablet, Take 1 tablet by mouth Daily., Disp: 30 tablet, Rfl: 4  •  Diclofenac Sodium (VOLTAREN) 1 % gel gel, Apply 4 g topically to the appropriate area as directed 3 (Three) Times a Day., Disp: 100 g, Rfl: 3  •  famotidine (Pepcid) 40 MG tablet, Take 1 tablet by mouth Daily., Disp: 30 tablet, Rfl: 5  •  HYDROcodone-acetaminophen (NORCO)  MG per tablet, Take 1 tablet by mouth Every 8 (Eight) Hours As Needed for Moderate Pain ., Disp: 90 tablet, Rfl: 0  •  Naloxone HCl 0.4 MG/0.4ML solution auto-injector, Inject 0.4 mL as directed 1 (One) Time As Needed (confusion) for up to 1 dose., Disp: 0.4 mL, Rfl: 1  •  nitrofurantoin (Macrodantin) 100 MG capsule, 1 PO prn intercourse, Disp: 20 capsule, Rfl: 1  •  spironolactone (ALDACTONE) 50 MG tablet, Take 2 tablets by mouth Daily As Needed (acne)., Disp: 180 tablet, Rfl: 0  •  Synthroid 112 MCG tablet, TAKE 1 TABLET BY MOUTH DAILY ON EMPTY STOMACH, Disp: 90 tablet, Rfl: 0      Review of systems  Review of Systems   Constitutional:  "Positive for fatigue.   Musculoskeletal: Positive for arthralgias.       Physical exam  Objective   Blood pressure 118/74, pulse 70, height 165.1 cm (65\"), weight 68.9 kg (151 lb 12.8 oz), SpO2 100 %, not currently breastfeeding.   Physical Exam   Constitutional: She is oriented to person, place, and time. She appears well-developed.   HENT:   Head: Normocephalic and atraumatic.   Eyes: Conjunctivae are normal.   Neck: No thyromegaly present.   The neck is supple and no assimetry visualized   Cardiovascular: Normal rate, regular rhythm, normal heart sounds and normal pulses.   Pulmonary/Chest: Effort normal and breath sounds normal.   Musculoskeletal: No swelling.   Neurological: She is alert and oriented to person, place, and time.   Psychiatric: Thought content normal.   Vitals reviewed.        LABS AND IMAGING  Lab Results   Component Value Date    TSH 2.090 06/28/2021    [id number] 11.70 (H) 09/13/2018           Assessment  Assessment/Plan   Problems Addressed this Visit        Other    Hypothyroidism - Primary    Relevant Orders    Comprehensive Metabolic Panel    TSH    T4, Free      Other Visit Diagnoses     Acne, unspecified acne type          Diagnoses       Codes Comments    Acquired hypothyroidism    -  Primary ICD-10-CM: E03.9  ICD-9-CM: 244.9     Acne, unspecified acne type     ICD-10-CM: L70.9  ICD-9-CM: 706.1           Plan  Synthroid 112 mcg daily , repeat labs. She has some sx of hypothyroidism.   -continue spironolactone, recheck CMP  -cont Vit D supplements 2000 units daily.     Follow-up in 9-12 months  "

## 2022-01-28 DIAGNOSIS — M54.40 BILATERAL LOW BACK PAIN WITH SCIATICA, SCIATICA LATERALITY UNSPECIFIED, UNSPECIFIED CHRONICITY: ICD-10-CM

## 2022-01-28 RX ORDER — HYDROCODONE BITARTRATE AND ACETAMINOPHEN 10; 325 MG/1; MG/1
1 TABLET ORAL EVERY 8 HOURS PRN
Qty: 90 TABLET | Refills: 0 | Status: SHIPPED | OUTPATIENT
Start: 2022-01-28 | End: 2022-02-24 | Stop reason: SDUPTHER

## 2022-01-28 NOTE — TELEPHONE ENCOUNTER
Caller: Subha Patino    Relationship: Self    Best call back number: [phone]    Requested Prescriptions:   Requested Prescriptions     Pending Prescriptions Disp Refills   • HYDROcodone-acetaminophen (NORCO)  MG per tablet 90 tablet 0     Sig: Take 1 tablet by mouth Every 8 (Eight) Hours As Needed for Moderate Pain .        Pharmacy where request should be sent:      [facility]/pharmacy #[id number] - [place], [place] - [address] AT [address]EXT TO [facility] - [phone]  - [phone] FX          Does the patient have less than a 3 day supply:  [x] Yes  [] No    Gissel Carbajal Rep   01/28/22 11:16 EST

## 2022-01-28 NOTE — TELEPHONE ENCOUNTER
Last Office Visit: 12/20/21  Next Office Visit: 3/8/22    Labs completed in past 6 months? no  Labs completed in past year? no    Last Refill Date: 12/25/21  Quantity: 90  Refills: 0    Pharmacy:     Please review pended refill request for any changes needed on refills or quantities. Thank you!

## 2022-01-31 ENCOUNTER — TELEPHONE (OUTPATIENT)
Dept: ENDOCRINOLOGY | Facility: CLINIC | Age: 42
End: 2022-01-31

## 2022-01-31 DIAGNOSIS — E03.9 ACQUIRED HYPOTHYROIDISM: Primary | ICD-10-CM

## 2022-02-02 NOTE — TELEPHONE ENCOUNTER
I am sorry, I thought I replied  Pt stated she had been taking Biotin, and she was taking an extra pill as you advised.

## 2022-02-02 NOTE — TELEPHONE ENCOUNTER
Returned pt call. Advised of results. Pt expressed understanding     Your thyroid function came abnormal with elevated free T4 and normal TSH. It seems as if you took too high dose on the day of the lab draw but the last 6 weeks indicator is normal.  This results could also be seen with Biotin. Please ask her if she is taking Biotin supplements (hair and nails). If she is not taking biotin and she didn't take by mistake extra tablet of synthroid on the day of the test, then we need to reduce the dose to 100 mcg. We could leave dose the same if she is on Biotin   Metabolic panel is normal.

## 2022-02-03 NOTE — TELEPHONE ENCOUNTER
Ok, then we can continue the same dose 112 mcg daily and recheck the labs in 3 months. Please order TSH and free T4 for her.

## 2022-02-24 DIAGNOSIS — M54.40 BILATERAL LOW BACK PAIN WITH SCIATICA, SCIATICA LATERALITY UNSPECIFIED, UNSPECIFIED CHRONICITY: ICD-10-CM

## 2022-02-24 RX ORDER — HYDROCODONE BITARTRATE AND ACETAMINOPHEN 10; 325 MG/1; MG/1
1 TABLET ORAL EVERY 8 HOURS PRN
Qty: 45 TABLET | Refills: 0 | Status: SHIPPED | OUTPATIENT
Start: 2022-02-24 | End: 2022-03-08 | Stop reason: SDUPTHER

## 2022-02-24 NOTE — TELEPHONE ENCOUNTER
Caller: Subha Patino    Relationship: Self    Best call back number: [phone] (H)    Requested Prescriptions:   Requested Prescriptions     Pending Prescriptions Disp Refills   • HYDROcodone-acetaminophen (NORCO)  MG per tablet 90 tablet 0     Sig: Take 1 tablet by mouth Every 8 (Eight) Hours As Needed for Moderate Pain .        Pharmacy where request should be sent: [facility]/PHARMACY #[id number] - [place], [place] - [facility] [address]EXT TO [facility] - [phone]  - [phone]      Additional details provided by patient: [facility] TIL 02/2822     Does the patient have less than a 3 day supply:  [] Yes  [x] No    Gissel Rosen Rep   02/24/22 14:20 EST

## 2022-03-08 ENCOUNTER — OFFICE VISIT (OUTPATIENT)
Dept: INTERNAL MEDICINE | Facility: CLINIC | Age: 42
End: 2022-03-08

## 2022-03-08 ENCOUNTER — LAB (OUTPATIENT)
Dept: LAB | Facility: HOSPITAL | Age: 42
End: 2022-03-08

## 2022-03-08 VITALS
HEART RATE: 78 BPM | DIASTOLIC BLOOD PRESSURE: 80 MMHG | SYSTOLIC BLOOD PRESSURE: 128 MMHG | WEIGHT: 157.4 LBS | HEIGHT: 65 IN | BODY MASS INDEX: 26.23 KG/M2 | OXYGEN SATURATION: 99 %

## 2022-03-08 DIAGNOSIS — L70.9 ACNE, UNSPECIFIED ACNE TYPE: ICD-10-CM

## 2022-03-08 DIAGNOSIS — N39.0 RECURRENT UTI: ICD-10-CM

## 2022-03-08 DIAGNOSIS — F41.9 ANXIETY: ICD-10-CM

## 2022-03-08 DIAGNOSIS — E53.8 VITAMIN B12 DEFICIENCY: ICD-10-CM

## 2022-03-08 DIAGNOSIS — M19.041 PRIMARY OSTEOARTHRITIS OF BOTH HANDS: ICD-10-CM

## 2022-03-08 DIAGNOSIS — R13.10 DYSPHAGIA, UNSPECIFIED TYPE: ICD-10-CM

## 2022-03-08 DIAGNOSIS — M19.042 PRIMARY OSTEOARTHRITIS OF BOTH HANDS: ICD-10-CM

## 2022-03-08 DIAGNOSIS — M54.40 BILATERAL LOW BACK PAIN WITH SCIATICA, SCIATICA LATERALITY UNSPECIFIED, UNSPECIFIED CHRONICITY: ICD-10-CM

## 2022-03-08 LAB
ALBUMIN SERPL-MCNC: 5.2 G/DL (ref 3.5–5.2)
ALBUMIN/GLOB SERPL: 2.5 G/DL
ALP SERPL-CCNC: 45 U/L (ref 39–117)
ALT SERPL W P-5'-P-CCNC: 32 U/L (ref 1–33)
ANION GAP SERPL CALCULATED.3IONS-SCNC: 13 MMOL/L (ref 5–15)
AST SERPL-CCNC: 28 U/L (ref 1–32)
BILIRUB SERPL-MCNC: 0.6 MG/DL (ref 0–1.2)
BUN SERPL-MCNC: 10 MG/DL (ref 6–20)
BUN/CREAT SERPL: 11.8 (ref 7–25)
CALCIUM SPEC-SCNC: 9.7 MG/DL (ref 8.6–10.5)
CHLORIDE SERPL-SCNC: 103 MMOL/L (ref 98–107)
CO2 SERPL-SCNC: 26 MMOL/L (ref 22–29)
CREAT SERPL-MCNC: 0.85 MG/DL (ref 0.57–1)
EGFRCR SERPLBLD CKD-EPI 2021: 87.8 ML/MIN/1.73
GLOBULIN UR ELPH-MCNC: 2.1 GM/DL
GLUCOSE SERPL-MCNC: 88 MG/DL (ref 65–99)
POTASSIUM SERPL-SCNC: 4 MMOL/L (ref 3.5–5.2)
PROT SERPL-MCNC: 7.3 G/DL (ref 6–8.5)
SODIUM SERPL-SCNC: 142 MMOL/L (ref 136–145)

## 2022-03-08 PROCEDURE — 80053 COMPREHEN METABOLIC PANEL: CPT

## 2022-03-08 PROCEDURE — 99213 OFFICE O/P EST LOW 20 MIN: CPT | Performed by: INTERNAL MEDICINE

## 2022-03-08 RX ORDER — SPIRONOLACTONE 50 MG/1
100 TABLET, FILM COATED ORAL DAILY PRN
Qty: 180 TABLET | Refills: 3 | Status: SHIPPED | OUTPATIENT
Start: 2022-03-08

## 2022-03-08 RX ORDER — FAMOTIDINE 40 MG/1
40 TABLET, FILM COATED ORAL DAILY
Qty: 90 TABLET | Refills: 3 | Status: SHIPPED | OUTPATIENT
Start: 2022-03-08 | End: 2022-12-03

## 2022-03-08 RX ORDER — NITROFURANTOIN MACROCRYSTALS 100 MG/1
CAPSULE ORAL
Qty: 20 CAPSULE | Refills: 1 | Status: SHIPPED | OUTPATIENT
Start: 2022-03-08 | End: 2022-07-18 | Stop reason: SDUPTHER

## 2022-03-08 RX ORDER — CYCLOBENZAPRINE HCL 10 MG
10 TABLET ORAL DAILY
Qty: 30 TABLET | Refills: 4 | Status: SHIPPED | OUTPATIENT
Start: 2022-03-08 | End: 2023-01-16 | Stop reason: SDUPTHER

## 2022-03-08 RX ORDER — HYDROCODONE BITARTRATE AND ACETAMINOPHEN 10; 325 MG/1; MG/1
1 TABLET ORAL EVERY 8 HOURS PRN
Qty: 90 TABLET | Refills: 0 | Status: SHIPPED | OUTPATIENT
Start: 2022-03-08 | End: 2022-04-11 | Stop reason: SDUPTHER

## 2022-03-08 RX ORDER — BUSPIRONE HYDROCHLORIDE 10 MG/1
10 TABLET ORAL 3 TIMES DAILY
Qty: 270 TABLET | Refills: 3 | Status: SHIPPED | OUTPATIENT
Start: 2022-03-08 | End: 2022-07-25 | Stop reason: DRUGHIGH

## 2022-03-08 NOTE — PROGRESS NOTES
Back Pain and Knee Pain (Left)    Subjective   Subha Patino is a 41 y.o. female is here today for follow-up.    History of Present Illness   Left nee hurt it working out, getting better,running makes it worse.  Thyroid was high last visit.      Current Outpatient Medications:   •  busPIRone (BUSPAR) 10 MG tablet, Take 1 tablet by mouth 3 (Three) Times a Day., Disp: 270 tablet, Rfl: 3  •  Cyanocobalamin (Vitamin B-12) 5000 MCG sublingual tablet, Take 5,000 mcg by mouth Daily., Disp: 90 tablet, Rfl: 3  •  cyclobenzaprine (FLEXERIL) 10 MG tablet, Take 1 tablet by mouth Daily., Disp: 30 tablet, Rfl: 4  •  Diclofenac Sodium (VOLTAREN) 1 % gel gel, Apply 4 g topically to the appropriate area as directed 3 (Three) Times a Day., Disp: 100 g, Rfl: 3  •  famotidine (Pepcid) 40 MG tablet, Take 1 tablet by mouth Daily., Disp: 90 tablet, Rfl: 3  •  HYDROcodone-acetaminophen (NORCO)  MG per tablet, Take 1 tablet by mouth Every 8 (Eight) Hours As Needed for Moderate Pain ., Disp: 90 tablet, Rfl: 0  •  Naloxone HCl 0.4 MG/0.4ML solution auto-injector, Inject 0.4 mL as directed 1 (One) Time As Needed (confusion) for up to 1 dose., Disp: 0.4 mL, Rfl: 1  •  nitrofurantoin (Macrodantin) 100 MG capsule, 1 PO prn intercourse, Disp: 20 capsule, Rfl: 1  •  spironolactone (ALDACTONE) 50 MG tablet, Take 2 tablets by mouth Daily As Needed (acne)., Disp: 180 tablet, Rfl: 3  •  Synthroid 112 MCG tablet, TAKE 1 TABLET BY MOUTH DAILY ON EMPTY STOMACH, Disp: 90 tablet, Rfl: 0      The following portions of the patient's history were reviewed and updated as appropriate: allergies, current medications, past family history, past medical history, past social history, past surgical history and problem list.    Review of Systems   Constitutional: Negative.  Negative for chills and fever.   HENT: Negative for ear discharge, ear pain, sinus pressure and sore throat.    Respiratory: Negative for cough, chest tightness and shortness of breath.   "  Cardiovascular: Negative for chest pain, palpitations and leg swelling.   Gastrointestinal: Negative for diarrhea, nausea and vomiting.   Musculoskeletal: Positive for arthralgias and joint swelling. Negative for back pain and myalgias.   Neurological: Negative for dizziness, syncope and headaches.   Psychiatric/Behavioral: Negative for confusion and sleep disturbance.       Objective   /80   Pulse 78   Ht 165.1 cm (65\")   Wt 71.4 kg (157 lb 6.4 oz)   SpO2 99% Comment: ra  BMI 26.19 kg/m²   Physical Exam  Vitals and nursing note reviewed.   Constitutional:       Appearance: She is well-developed.   HENT:      Head: Normocephalic and atraumatic.   Eyes:      Conjunctiva/sclera: Conjunctivae normal.      Pupils: Pupils are equal, round, and reactive to light.   Neck:      Thyroid: No thyromegaly.   Cardiovascular:      Rate and Rhythm: Normal rate and regular rhythm.      Pulses: Normal pulses.      Heart sounds: Normal heart sounds. No murmur heard.    No friction rub. No gallop.   Pulmonary:      Effort: Pulmonary effort is normal.      Breath sounds: Normal breath sounds.   Abdominal:      General: Bowel sounds are normal. There is no distension.      Palpations: Abdomen is soft.      Tenderness: There is no abdominal tenderness.   Musculoskeletal:      Cervical back: Neck supple.   Skin:     General: Skin is warm and dry.   Neurological:      Mental Status: She is alert and oriented to person, place, and time.      Cranial Nerves: No cranial nerve deficit.   Psychiatric:         Judgment: Judgment normal.           Results for orders placed or performed in visit on 01/20/22   Comprehensive Metabolic Panel    Specimen: Blood   Result Value Ref Range    Glucose 86 65 - 99 mg/dL    BUN 9 6 - 20 mg/dL    Creatinine 0.80 0.57 - 1.00 mg/dL    Sodium 138 136 - 145 mmol/L    Potassium 4.4 3.5 - 5.2 mmol/L    Chloride 103 98 - 107 mmol/L    CO2 27.1 22.0 - 29.0 mmol/L    Calcium 9.6 8.6 - 10.5 mg/dL    Total " Protein 7.1 6.0 - 8.5 g/dL    Albumin 5.00 3.50 - 5.20 g/dL    ALT (SGPT) 18 1 - 33 U/L    AST (SGOT) 22 1 - 32 U/L    Alkaline Phosphatase 44 39 - 117 U/L    Total Bilirubin 0.7 0.0 - 1.2 mg/dL    eGFR Non African Amer 79 >60 mL/min/1.73    Globulin 2.1 gm/dL    A/G Ratio 2.4 g/dL    BUN/Creatinine Ratio 11.3 7.0 - 25.0    Anion Gap 7.9 5.0 - 15.0 mmol/L   TSH    Specimen: Blood   Result Value Ref Range    TSH 1.120 0.270 - 4.200 uIU/mL   T4, Free    Specimen: Blood   Result Value Ref Range    Free T4 2.28 (H) 0.93 - 1.70 ng/dL             Assessment/Plan   Diagnoses and all orders for this visit:    Anxiety  -     busPIRone (BUSPAR) 10 MG tablet; Take 1 tablet by mouth 3 (Three) Times a Day.    Vitamin B12 deficiency  -     Cyanocobalamin (Vitamin B-12) 5000 MCG sublingual tablet; Take 5,000 mcg by mouth Daily.    Bilateral low back pain with sciatica, sciatica laterality unspecified, unspecified chronicity  Comments:  well controlled on current regimen.  Orders:  -     cyclobenzaprine (FLEXERIL) 10 MG tablet; Take 1 tablet by mouth Daily.  -     HYDROcodone-acetaminophen (NORCO)  MG per tablet; Take 1 tablet by mouth Every 8 (Eight) Hours As Needed for Moderate Pain .    Primary osteoarthritis of both hands  Comments:  right > left, chiefly at 1st mcp jt.  Orders:  -     Diclofenac Sodium (VOLTAREN) 1 % gel gel; Apply 4 g topically to the appropriate area as directed 3 (Three) Times a Day.    Dysphagia, unspecified type  Comments:  cinb, will proceed with egd.  Orders:  -     famotidine (Pepcid) 40 MG tablet; Take 1 tablet by mouth Daily.    Recurrent UTI  -     nitrofurantoin (Macrodantin) 100 MG capsule; 1 PO prn intercourse    Acne, unspecified acne type  -     spironolactone (ALDACTONE) 50 MG tablet; Take 2 tablets by mouth Daily As Needed (acne).             The patient has read and signed the [facility] Controlled Substance Contract.  I will continue to see patient for regular follow up  appointments.  They are well controlled on their medication.  MARJORIE has been reviewed by me and is updated every 3 months. The patient is aware of the potential for addiction and dependence.      Return in about 2 months (around 5/8/2022).    Electronically signed by:    Catrina Todd MD

## 2022-03-22 ENCOUNTER — TELEPHONE (OUTPATIENT)
Dept: INTERNAL MEDICINE | Facility: CLINIC | Age: 42
End: 2022-03-22

## 2022-03-22 NOTE — TELEPHONE ENCOUNTER
.    Caller: Subha Patino    Relationship: Self    Best call back number: [phone]    Caller requesting test results: SELF    What test was performed: LABS    When was the test performed: 3/8/22    Where was the test performed: [facility]    Additional notes: PLEASE CALL WITH RESULTS

## 2022-03-22 NOTE — TELEPHONE ENCOUNTER
Please let her know letter was sent on 3/13/22 with the following message-       Your kidney function and electrolytes are normal. Please continue current regimen.    Please resend the letter if she would like to have a copy.    thanks

## 2022-03-22 NOTE — TELEPHONE ENCOUNTER
Let pt know of results. Pt verbalized understanding. Pt was wanting her thyroid results, however lab didn't collect those only the CMP. Pt stated let lab know before drawing what she was there to get done. Pt states will come in by Monday to have done.

## 2022-03-28 ENCOUNTER — LAB (OUTPATIENT)
Dept: LAB | Facility: HOSPITAL | Age: 42
End: 2022-03-28

## 2022-03-28 DIAGNOSIS — E03.9 ACQUIRED HYPOTHYROIDISM: ICD-10-CM

## 2022-03-28 LAB
T4 FREE SERPL-MCNC: 1.76 NG/DL (ref 0.93–1.7)
TSH SERPL DL<=0.05 MIU/L-ACNC: 4.12 UIU/ML (ref 0.27–4.2)

## 2022-03-28 PROCEDURE — 36415 COLL VENOUS BLD VENIPUNCTURE: CPT

## 2022-03-28 PROCEDURE — 84443 ASSAY THYROID STIM HORMONE: CPT

## 2022-03-28 PROCEDURE — 84439 ASSAY OF FREE THYROXINE: CPT

## 2022-03-28 RX ORDER — LEVOTHYROXINE SODIUM 112 MCG
112 TABLET ORAL DAILY
Qty: 90 TABLET | Refills: 0 | Status: SHIPPED | OUTPATIENT
Start: 2022-03-28 | End: 2022-06-22

## 2022-03-30 ENCOUNTER — TELEPHONE (OUTPATIENT)
Dept: ENDOCRINOLOGY | Facility: CLINIC | Age: 42
End: 2022-03-30

## 2022-03-30 NOTE — TELEPHONE ENCOUNTER
PATIENT WOULD LIKE A CALL BACK ABOUT THYROID RESULTS AND IF WE ARE GOING TO CHANGE MEDICATION DOSAGES. PHONE NUMBER -[phone]

## 2022-04-11 DIAGNOSIS — M54.40 BILATERAL LOW BACK PAIN WITH SCIATICA, SCIATICA LATERALITY UNSPECIFIED, UNSPECIFIED CHRONICITY: ICD-10-CM

## 2022-04-11 RX ORDER — HYDROCODONE BITARTRATE AND ACETAMINOPHEN 10; 325 MG/1; MG/1
1 TABLET ORAL EVERY 8 HOURS PRN
Qty: 90 TABLET | Refills: 0 | Status: SHIPPED | OUTPATIENT
Start: 2022-04-11 | End: 2022-05-09 | Stop reason: SDUPTHER

## 2022-04-11 NOTE — TELEPHONE ENCOUNTER
Caller: Subha Patino    Relationship: Self    Best call back number:     [phone]    Requested Prescriptions:   Requested Prescriptions     Pending Prescriptions Disp Refills   • HYDROcodone-acetaminophen (NORCO)  MG per tablet 90 tablet 0     Sig: Take 1 tablet by mouth Every 8 (Eight) Hours As Needed for Moderate Pain .      Pharmacy where request should be sent: [facility]ACY #[id number] - [place], [place] - [address] AT NEXT TO [facility] - [phone]  - [phone] FX     Additional details provided by patient:     PATIENT STATED SHE HAS A (1) DAY SUPPLY LEFT    Does the patient have less than a 3 day supply:  [x] Yes  [] No    Gissel Torres Rep   04/11/22 09:39 EDT     DR MEJÍA

## 2022-04-11 NOTE — TELEPHONE ENCOUNTER
Rx Refill Note  Requested Prescriptions     Pending Prescriptions Disp Refills   • HYDROcodone-acetaminophen (NORCO)  MG per tablet 90 tablet 0     Sig: Take 1 tablet by mouth Every 8 (Eight) Hours As Needed for Moderate Pain .      Last office visit with prescribing clinician: 3/8/2022      Next office visit with prescribing clinician: 5/9/2022            Renee Christy MA  04/11/22, 12:13 EDT

## 2022-05-09 ENCOUNTER — OFFICE VISIT (OUTPATIENT)
Dept: INTERNAL MEDICINE | Facility: CLINIC | Age: 42
End: 2022-05-09

## 2022-05-09 VITALS
OXYGEN SATURATION: 99 % | WEIGHT: 154 LBS | HEIGHT: 65 IN | HEART RATE: 88 BPM | SYSTOLIC BLOOD PRESSURE: 118 MMHG | DIASTOLIC BLOOD PRESSURE: 80 MMHG | RESPIRATION RATE: 16 BRPM | BODY MASS INDEX: 25.66 KG/M2

## 2022-05-09 DIAGNOSIS — M54.40 BILATERAL LOW BACK PAIN WITH SCIATICA, SCIATICA LATERALITY UNSPECIFIED, UNSPECIFIED CHRONICITY: Primary | ICD-10-CM

## 2022-05-09 DIAGNOSIS — M70.52 PES ANSERINUS BURSITIS OF LEFT KNEE: ICD-10-CM

## 2022-05-09 DIAGNOSIS — M54.40 BILATERAL LOW BACK PAIN WITH SCIATICA, SCIATICA LATERALITY UNSPECIFIED, UNSPECIFIED CHRONICITY: ICD-10-CM

## 2022-05-09 PROCEDURE — 99213 OFFICE O/P EST LOW 20 MIN: CPT | Performed by: INTERNAL MEDICINE

## 2022-05-09 RX ORDER — HYDROCODONE BITARTRATE AND ACETAMINOPHEN 10; 325 MG/1; MG/1
1 TABLET ORAL EVERY 8 HOURS PRN
Qty: 90 TABLET | Refills: 0 | Status: SHIPPED | OUTPATIENT
Start: 2022-05-09 | End: 2022-06-13 | Stop reason: SDUPTHER

## 2022-05-09 NOTE — PROGRESS NOTES
Follow-up (2 month follow up ) and Knee Pain (Left knee )    Subjective   Subha Patino is a 42 y.o. female is here today for follow-up.    History of Present Illness   Left knee hurts since a work out in the gym. Hurts with certain activities.  Doing well otw.  Here for a follow u osvaldo her chronic lbp. Doing well on current regimen.    Current Outpatient Medications:   •  busPIRone (BUSPAR) 10 MG tablet, Take 1 tablet by mouth 3 (Three) Times a Day., Disp: 270 tablet, Rfl: 3  •  Cyanocobalamin (Vitamin B-12) 5000 MCG sublingual tablet, Take 5,000 mcg by mouth Daily., Disp: 90 tablet, Rfl: 3  •  cyclobenzaprine (FLEXERIL) 10 MG tablet, Take 1 tablet by mouth Daily., Disp: 30 tablet, Rfl: 4  •  Diclofenac Sodium (VOLTAREN) 1 % gel gel, Apply 4 g topically to the appropriate area as directed 3 (Three) Times a Day., Disp: 100 g, Rfl: 3  •  famotidine (Pepcid) 40 MG tablet, Take 1 tablet by mouth Daily., Disp: 90 tablet, Rfl: 3  •  HYDROcodone-acetaminophen (NORCO)  MG per tablet, Take 1 tablet by mouth Every 8 (Eight) Hours As Needed for Moderate Pain ., Disp: 90 tablet, Rfl: 0  •  nitrofurantoin (Macrodantin) 100 MG capsule, 1 PO prn intercourse, Disp: 20 capsule, Rfl: 1  •  spironolactone (ALDACTONE) 50 MG tablet, Take 2 tablets by mouth Daily As Needed (acne)., Disp: 180 tablet, Rfl: 3  •  Synthroid 112 MCG tablet, TAKE 1 TABLET BY MOUTH DAILY ON EMPTY STOMACH, Disp: 90 tablet, Rfl: 0  •  Naloxone HCl 0.4 MG/0.4ML solution auto-injector, Inject 0.4 mL as directed 1 (One) Time As Needed (confusion) for up to 1 dose., Disp: 0.4 mL, Rfl: 1      The following portions of the patient's history were reviewed and updated as appropriate: allergies, current medications, past family history, past medical history, past social history, past surgical history and problem list.    Review of Systems   Constitutional: Negative.  Negative for chills and fever.   HENT: Negative for ear discharge, ear pain, sinus pressure and  "sore throat.    Respiratory: Negative for cough, chest tightness and shortness of breath.    Cardiovascular: Negative for chest pain, palpitations and leg swelling.   Gastrointestinal: Negative for diarrhea, nausea and vomiting.   Musculoskeletal: Positive for arthralgias, back pain and myalgias. Negative for gait problem.   Neurological: Negative for dizziness, syncope and headaches.   Psychiatric/Behavioral: Negative for confusion and sleep disturbance.       Objective   /80 (BP Location: Left arm, Patient Position: Sitting, Cuff Size: Adult)   Pulse 88   Resp 16   Ht 165.1 cm (65\")   Wt 69.9 kg (154 lb)   SpO2 99%   Breastfeeding No   BMI 25.63 kg/m²   Physical Exam  Vitals and nursing note reviewed.   Constitutional:       Appearance: She is well-developed.   HENT:      Head: Normocephalic and atraumatic.      Right Ear: External ear normal.      Left Ear: External ear normal.      Mouth/Throat:      Pharynx: No oropharyngeal exudate.   Eyes:      Conjunctiva/sclera: Conjunctivae normal.      Pupils: Pupils are equal, round, and reactive to light.   Neck:      Thyroid: No thyromegaly.   Cardiovascular:      Rate and Rhythm: Normal rate and regular rhythm.      Pulses: Normal pulses.      Heart sounds: Normal heart sounds. No murmur heard.    No friction rub. No gallop.   Pulmonary:      Effort: Pulmonary effort is normal.      Breath sounds: Normal breath sounds.   Abdominal:      General: Bowel sounds are normal. There is no distension.      Palpations: Abdomen is soft.      Tenderness: There is no abdominal tenderness.   Musculoskeletal:         General: Tenderness (back and rt knee) present. No swelling.      Cervical back: Neck supple.   Skin:     General: Skin is warm and dry.   Neurological:      Mental Status: She is alert and oriented to person, place, and time.      Cranial Nerves: No cranial nerve deficit.   Psychiatric:         Judgment: Judgment normal.           Results for orders " placed or performed in visit on 03/28/22   TSH    Specimen: Blood   Result Value Ref Range    TSH 4.120 0.270 - 4.200 uIU/mL   T4, Free    Specimen: Blood   Result Value Ref Range    Free T4 1.76 (H) 0.93 - 1.70 ng/dL             Assessment & Plan   Diagnoses and all orders for this visit:    Bilateral low back pain with sciatica, sciatica laterality unspecified, unspecified chronicity  -     HYDROcodone-acetaminophen (NORCO)  MG per tablet; Take 1 tablet by mouth Every 8 (Eight) Hours As Needed for Moderate Pain .    Pes anserinus bursitis of left knee  -     XR knee 1 or 2 vw left; Future    Bilateral low back pain with sciatica, sciatica laterality unspecified, unspecified chronicity  Comments:  well controlled on current regimen.  Orders:  -     HYDROcodone-acetaminophen (NORCO)  MG per tablet; Take 1 tablet by mouth Every 8 (Eight) Hours As Needed for Moderate Pain .    The patient has read and signed the [facility] Controlled Substance Contract.  I will continue to see patient for regular follow up appointments.  They are well controlled on their medication.  MARJORIE has been reviewed by me and is updated every 3 months. The patient is aware of the potential for addiction and dependence.               Return for Next scheduled follow up.    Electronically signed by:    Catrina Todd MD

## 2022-05-26 ENCOUNTER — TELEPHONE (OUTPATIENT)
Dept: INTERNAL MEDICINE | Facility: CLINIC | Age: 42
End: 2022-05-26

## 2022-05-26 NOTE — TELEPHONE ENCOUNTER
Dr. Arciniega & [facility] called to request last ofc note & most recent labs; faxed to:  [phone]

## 2022-06-13 DIAGNOSIS — M54.40 BILATERAL LOW BACK PAIN WITH SCIATICA, SCIATICA LATERALITY UNSPECIFIED, UNSPECIFIED CHRONICITY: ICD-10-CM

## 2022-06-13 NOTE — TELEPHONE ENCOUNTER
Last Office Visit: 5/9/22  Next Office Visit: 7/18/22    Labs completed in past 6 months? no  Labs completed in past year? no    Last Refill Date: 5/9/22  Quantity: 90  Refills: 0    Pharmacy:     Please review pended refill request for any changes needed on refills or quantities. Thank you!

## 2022-06-13 NOTE — TELEPHONE ENCOUNTER
Caller: Subha Patino    Relationship: Self    Best call back number: [phone]    Requested Prescriptions:   Requested Prescriptions     Pending Prescriptions Disp Refills   • HYDROcodone-acetaminophen (NORCO)  MG per tablet 90 tablet 0     Sig: Take 1 tablet by mouth Every 8 (Eight) Hours As Needed for Moderate Pain .        Pharmacy where request should be sent: [facility]/PH[facility]ACY #[id number] - [place], [place] - [address] AT NEXT TO [facility] - [phone]  - [phone] FX     Additional details provided by patient:  PATIENT HAS 2 DAYS OF MEDICATION.     Does the patient have less than a 3 day supply:  [x] Yes  [] No    Shanae Perez   06/13/22 16:29 EDT

## 2022-06-14 RX ORDER — HYDROCODONE BITARTRATE AND ACETAMINOPHEN 10; 325 MG/1; MG/1
1 TABLET ORAL EVERY 8 HOURS PRN
Qty: 90 TABLET | Refills: 0 | Status: SHIPPED | OUTPATIENT
Start: 2022-06-14 | End: 2022-07-18 | Stop reason: SDUPTHER

## 2022-06-22 RX ORDER — LEVOTHYROXINE SODIUM 112 MCG
112 TABLET ORAL DAILY
Qty: 90 TABLET | Refills: 1 | Status: SHIPPED | OUTPATIENT
Start: 2022-06-22 | End: 2022-11-07

## 2022-07-18 ENCOUNTER — TELEPHONE (OUTPATIENT)
Dept: INTERNAL MEDICINE | Facility: CLINIC | Age: 42
End: 2022-07-18

## 2022-07-18 ENCOUNTER — OFFICE VISIT (OUTPATIENT)
Dept: INTERNAL MEDICINE | Facility: CLINIC | Age: 42
End: 2022-07-18

## 2022-07-18 VITALS
HEART RATE: 63 BPM | SYSTOLIC BLOOD PRESSURE: 138 MMHG | DIASTOLIC BLOOD PRESSURE: 80 MMHG | WEIGHT: 156 LBS | BODY MASS INDEX: 25.99 KG/M2 | HEIGHT: 65 IN | TEMPERATURE: 98.1 F | OXYGEN SATURATION: 99 %

## 2022-07-18 DIAGNOSIS — F32.A ANXIETY AND DEPRESSION: ICD-10-CM

## 2022-07-18 DIAGNOSIS — E03.9 ACQUIRED HYPOTHYROIDISM: ICD-10-CM

## 2022-07-18 DIAGNOSIS — Q21.12 PFO (PATENT FORAMEN OVALE): ICD-10-CM

## 2022-07-18 DIAGNOSIS — F41.9 ANXIETY AND DEPRESSION: ICD-10-CM

## 2022-07-18 DIAGNOSIS — M54.40 BILATERAL LOW BACK PAIN WITH SCIATICA, SCIATICA LATERALITY UNSPECIFIED, UNSPECIFIED CHRONICITY: ICD-10-CM

## 2022-07-18 DIAGNOSIS — Z12.4 CERVICAL CANCER SCREENING: ICD-10-CM

## 2022-07-18 DIAGNOSIS — N39.0 RECURRENT UTI: ICD-10-CM

## 2022-07-18 DIAGNOSIS — E53.8 VITAMIN B12 DEFICIENCY: ICD-10-CM

## 2022-07-18 DIAGNOSIS — Z00.00 ROUTINE GENERAL MEDICAL EXAMINATION AT A HEALTH CARE FACILITY: Primary | ICD-10-CM

## 2022-07-18 LAB
BILIRUB BLD-MCNC: NEGATIVE MG/DL
CLARITY, POC: CLEAR
COLOR UR: NORMAL
EXPIRATION DATE: NORMAL
GLUCOSE UR STRIP-MCNC: NEGATIVE MG/DL
KETONES UR QL: NEGATIVE
LEUKOCYTE EST, POC: NEGATIVE
Lab: NORMAL
NITRITE UR-MCNC: NEGATIVE MG/ML
PH UR: 6 [PH] (ref 5–8)
PROT UR STRIP-MCNC: NEGATIVE MG/DL
RBC # UR STRIP: NEGATIVE /UL
SP GR UR: 1.01 (ref 1–1.03)
UROBILINOGEN UR QL: NORMAL

## 2022-07-18 PROCEDURE — 99396 PREV VISIT EST AGE 40-64: CPT | Performed by: INTERNAL MEDICINE

## 2022-07-18 PROCEDURE — 81003 URINALYSIS AUTO W/O SCOPE: CPT | Performed by: INTERNAL MEDICINE

## 2022-07-18 RX ORDER — NITROFURANTOIN MACROCRYSTALS 100 MG/1
CAPSULE ORAL
Qty: 20 CAPSULE | Refills: 1 | Status: SHIPPED | OUTPATIENT
Start: 2022-07-18 | End: 2023-01-16 | Stop reason: SDUPTHER

## 2022-07-18 RX ORDER — HYDROCODONE BITARTRATE AND ACETAMINOPHEN 10; 325 MG/1; MG/1
1 TABLET ORAL EVERY 8 HOURS PRN
Qty: 90 TABLET | Refills: 0 | Status: SHIPPED | OUTPATIENT
Start: 2022-07-18 | End: 2022-08-19 | Stop reason: SDUPTHER

## 2022-07-18 RX ORDER — ASPIRIN 81 MG/1
81 TABLET ORAL DAILY
COMMUNITY

## 2022-07-18 NOTE — PROGRESS NOTES
Chief Complaint   Patient presents with   • Annual Exam       History of Present Illness  HM, Adult Female: The patient is being seen for a health maintenance and gynecology evaluation. The last health maintenance visit was 1 year(s) ago.   Social History: She is /single//. Work status:  She has never smoked. She reports never drinking alcohol. Denies any illicit drug use.  General Health: The patient's health is described as good. She has regular dental visits. She denies vision problems. She denies hearing loss. Immunizations status: up to date.   Lifestyle:. She consumes a diverse and healthy diet. She does not have any weight concerns. She exercises regularly. She denies tobacco. She denies alcohol use. She denies drug use.   Reproductive health:. She reports normal menses.   Screening: Cancer screening reviewed and current.   Metabolic screening reviewed and current.   Risk screening reviewed and current.     Was fasting and her electrolytes  Were off, and started having palps, and went to Dr. Denisa Elizabeth - Cardiology in [place], who did extensive testing, EKG okay, Holter okay- PVCs,, Echo initially was non diagnostic, but then had another one with a saline bubble study- dxed with a PFO.  Was advised to monitor sxs.  She has been very anxious as a result.unable to get a  on herself.  Notes more warts on her skin.    Review of Systems   Constitutional: Negative for chills and fatigue.   HENT: Negative for congestion, ear pain and sore throat.    Eyes: Negative for pain, redness and visual disturbance.   Respiratory: Negative for cough and shortness of breath.    Cardiovascular: Positive for palpitations. Negative for chest pain and leg swelling.   Gastrointestinal: Negative for abdominal pain, diarrhea and nausea.   Endocrine: Negative for cold intolerance and heat intolerance.   Genitourinary: Negative for flank pain and urgency.   Musculoskeletal: Negative for arthralgias and gait  problem.   Skin: Negative for pallor and rash.   Neurological: Negative for dizziness, weakness and headaches.   Psychiatric/Behavioral: Positive for dysphoric mood. Negative for sleep disturbance. The patient is nervous/anxious.        Patient Active Problem List   Diagnosis   • Low back pain   • Reaction to chronic stress   • Hypothyroidism   • Actinic keratosis   • Allergic rhinitis   • Carpal tunnel syndrome   • Dyslipidemia   • Hyperglycemia   • Nontoxic single thyroid nodule   • Vitamin B12 deficiency   • Vitamin D deficiency   • Lump or mass in breast   • Current smoker   • Panic attacks       Social History     Socioeconomic History   • Marital status:    Tobacco Use   • Smoking status: Former Smoker     Packs/day: 0.75     Years: 20.00     Pack years: 15.00     Types: Cigarettes     Quit date: 10/4/2020     Years since quittin.7   • Smokeless tobacco: Never Used   Substance and Sexual Activity   • Drug use: No   • Sexual activity: Defer       Current Outpatient Medications on File Prior to Visit   Medication Sig Dispense Refill   • aspirin 81 MG EC tablet Take 81 mg by mouth Daily.     • busPIRone (BUSPAR) 10 MG tablet Take 1 tablet by mouth 3 (Three) Times a Day. 270 tablet 3   • Cyanocobalamin (Vitamin B-12) 5000 MCG sublingual tablet Take 5,000 mcg by mouth Daily. 90 tablet 3   • cyclobenzaprine (FLEXERIL) 10 MG tablet Take 1 tablet by mouth Daily. 30 tablet 4   • Diclofenac Sodium (VOLTAREN) 1 % gel gel Apply 4 g topically to the appropriate area as directed 3 (Three) Times a Day. 100 g 3   • famotidine (Pepcid) 40 MG tablet Take 1 tablet by mouth Daily. 90 tablet 3   • Naloxone HCl 0.4 MG/0.4ML solution auto-injector Inject 0.4 mL as directed 1 (One) Time As Needed (confusion) for up to 1 dose. 0.4 mL 1   • spironolactone (ALDACTONE) 50 MG tablet Take 2 tablets by mouth Daily As Needed (acne). 180 tablet 3   • Synthroid 112 MCG tablet TAKE 1 TABLET BY MOUTH DAILY ON EMPTY STOMACH 90 tablet 1  "    No current facility-administered medications on file prior to visit.       Allergies   Allergen Reactions   • Robitussin (Alcohol Free) [Guaifenesin] Other (See Comments)     Tachycardia       /80   Pulse 63   Temp 98.1 °F (36.7 °C)   Ht 165.1 cm (65\")   Wt 70.8 kg (156 lb)   SpO2 99% Comment: ra  BMI 25.96 kg/m²            The following portions of the patient's history were reviewed and updated as appropriate: allergies, current medications, past family history, past medical history, past social history, past surgical history and problem list.    Physical Exam  Constitutional:       General: She is not in acute distress.     Appearance: Normal appearance.   HENT:      Head: Normocephalic and atraumatic.      Right Ear: Tympanic membrane and external ear normal.      Left Ear: Tympanic membrane and external ear normal.      Nose: Nose normal.      Mouth/Throat:      Mouth: Mucous membranes are moist.   Eyes:      General: No scleral icterus.  Neck:      Vascular: No carotid bruit.   Cardiovascular:      Rate and Rhythm: Normal rate and regular rhythm.      Pulses: Normal pulses.      Heart sounds: Normal heart sounds. No murmur heard.    No friction rub. No gallop.   Pulmonary:      Effort: Pulmonary effort is normal.      Breath sounds: Normal breath sounds. No rhonchi or rales.   Abdominal:      General: Bowel sounds are normal. There is no distension.      Palpations: Abdomen is soft.      Tenderness: There is no right CVA tenderness, left CVA tenderness, guarding or rebound.      Hernia: No hernia is present.   Musculoskeletal:         General: No tenderness. Normal range of motion.      Cervical back: Normal range of motion.      Right lower leg: No edema.      Left lower leg: No edema.   Lymphadenopathy:      Cervical: No cervical adenopathy.   Skin:     General: Skin is warm.      Findings: No rash.   Neurological:      General: No focal deficit present.      Mental Status: She is alert and " oriented to person, place, and time. Mental status is at baseline.      Cranial Nerves: No cranial nerve deficit.      Sensory: No sensory deficit.      Coordination: Coordination normal.      Gait: Gait normal.      Deep Tendon Reflexes: Reflexes normal.   Psychiatric:         Mood and Affect: Mood normal.         Behavior: Behavior normal.         Results for orders placed or performed in visit on 22   POCT urinalysis dipstick, automated    Specimen: Urine   Result Value Ref Range    Color Straw Yellow, Straw, Dark Yellow, Tabby    Clarity, UA Clear Clear    Specific Gravity  1.010 1.005 - 1.030    pH, Urine 6.0 5.0 - 8.0    Leukocytes Negative Negative    Nitrite, UA Negative Negative    Protein, POC Negative Negative mg/dL    Glucose, UA Negative Negative mg/dL    Ketones, UA Negative Negative    Urobilinogen, UA Normal Normal    Bilirubin Negative Negative    Blood, UA Negative Negative    Lot Number [id number]     Expiration Date 23    LIQUID-BASED PAP SMEAR, [facility] ([facility])    Specimen: Cervix; ThinPrep Vial   Result Value Ref Range    Reference Lab Report       [facility]  [address] [place] [address]  Phone: [phone] or [phone]  Fax: [phone]  Kana Ramirez M.D., Medical Director    PATIENT NAME                           LABORATORY NO.  MIGUEL PEREZ                      [id number]  [id number]                         AGE              SEX  SSN           CLIENT REF #  [facility]             42      1980  F    [id number]   [id number]    ([place])                         REQUESTING M.D.     ATTENDING M.D.     COPY TO.  [address] [place] [address]                DATE COLLECTED      DATE RECEIVED      DATE REPORTED  2022    ThinPrep Pap with [facility]    DIAGNOSIS:  Negative for intraepithelial lesion or  malignancy    Multiple factors can influence accuracy of Pap tests; therefore, screening at  regular intervals is necessary for early cancer detection.      SPECIMEN  ADEQUACY:  SATISFACTORY FOR EVALUATION  Transformation zone is present.  SOURCE OF SPECIMEN:  CERVICAL  SLIDES:  1  CLINICAL HISTORY:  A Routine, Tubal    HPV  HR-HPV POOL: Negative    The Aptima HPV assay is an in vitro nucleic acid amplification test for the  qualitative detection of E6/E7 viral messenger RNA from 14 high risk types of  HPV in cervical specimens. The high risk HPV types detected include: 16, 18,  31, 33, 35, 39, 45, 51, 52, 56, 58, 59, 66, 68    CYTOTECHNOLOGIST:      ELIUD RON (ASCP)    CPT CODES:  10983, 49558         Diagnoses and all orders for this visit:    1. Routine general medical examination at a health care facility (Primary)  -     CBC (No Diff); Future  -     Comprehensive Metabolic Panel; Future  -     Lipid Panel; Future  -     Vitamin D 25 Hydroxy; Future  -     Hemoglobin A1c; Future  -     POCT urinalysis dipstick, automated    2. Vitamin B12 deficiency  -     Vitamin B12; Future    3. Bilateral low back pain with sciatica, sciatica laterality unspecified, unspecified chronicity  Comments:  well controlled on current regimen.  Orders:  -     HYDROcodone-acetaminophen (NORCO)  MG per tablet; Take 1 tablet by mouth Every 8 (Eight) Hours As Needed for Moderate Pain .  Dispense: 90 tablet; Refill: 0    4. Recurrent UTI  -     nitrofurantoin (Macrodantin) 100 MG capsule; 1 PO prn intercourse  Dispense: 20 capsule; Refill: 1    5. Anxiety and depression  -     Ambulatory Referral to Behavioral Health    6. Acquired hypothyroidism  -     TSH; Future  -     T4, Free; Future    7. Cervical cancer screening  -     LIQUID-BASED PAP SMEAR, [facility] ([facility]); Future  -     LIQUID-BASED PAP SMEAR, [facility] ([facility])    8. PFO (patent foramen ovale)  Comments:  will obtain records, Pt very stressed. Adv will review  and determine if needs a 2nd opinion with Dr Causey.  thanks    [facility] referral placed to [facility], discussed with his . Appt. Made for next Monday.    Re:       Health Maintenance   Topic Date Due   • COVID-19 Vaccine (1) Never done   • INFLUENZA VACCINE  10/01/2022   • ANNUAL PHYSICAL  07/19/2023   • TDAP/TD VACCINES (2 - Td or Tdap) 09/09/2023   • PAP SMEAR  07/18/2025   • HEPATITIS C SCREENING  Completed   • Pneumococcal Vaccine 0-64  Aged Out       Discussion/Summary  Impression: health maintenance visit. Currently, she eats an adequate diet and has an adequate exercise regimen.   Cervical cancer screening:Pap smear is current.   Breast cancer screening: mammogram is current.   Colorectal cancer screening: colonoscopy due at 45.  Osteoporosis screening: Bone mineral density test is not indicated.   CT low dose screen - is due after 55  Screening lab work includes hemoglobin, glucose, lipid profile, thyroid function testing, 25-hydroxyvitamin D and urinalysis.   Immunizations are needed, immunizations will be given as outlined in the orders   Advice and education were given regarding cardiovascular risk reduction, healthy dietary habits, Seatbelt and helmet use and self skin examination.     Return in about 2 months (around 9/18/2022) for Next scheduled follow up.      Electronically signed by:    Catrina Todd MD

## 2022-07-21 LAB — REF LAB TEST METHOD: NORMAL

## 2022-07-25 ENCOUNTER — LAB (OUTPATIENT)
Dept: LAB | Facility: HOSPITAL | Age: 42
End: 2022-07-25

## 2022-07-25 ENCOUNTER — OFFICE VISIT (OUTPATIENT)
Dept: BEHAVIORAL HEALTH | Facility: CLINIC | Age: 42
End: 2022-07-25

## 2022-07-25 VITALS
BODY MASS INDEX: 25.69 KG/M2 | SYSTOLIC BLOOD PRESSURE: 132 MMHG | WEIGHT: 154.2 LBS | DIASTOLIC BLOOD PRESSURE: 74 MMHG | HEIGHT: 65 IN

## 2022-07-25 DIAGNOSIS — Z00.00 ROUTINE GENERAL MEDICAL EXAMINATION AT A HEALTH CARE FACILITY: ICD-10-CM

## 2022-07-25 DIAGNOSIS — E03.9 ACQUIRED HYPOTHYROIDISM: ICD-10-CM

## 2022-07-25 DIAGNOSIS — F41.0 PANIC ATTACKS: ICD-10-CM

## 2022-07-25 DIAGNOSIS — F41.1 GENERALIZED ANXIETY DISORDER: Primary | ICD-10-CM

## 2022-07-25 DIAGNOSIS — E53.8 VITAMIN B12 DEFICIENCY: ICD-10-CM

## 2022-07-25 LAB
25(OH)D3 SERPL-MCNC: 66.9 NG/ML (ref 30–100)
ALBUMIN SERPL-MCNC: 4.3 G/DL (ref 3.5–5.2)
ALBUMIN/GLOB SERPL: 2.2 G/DL
ALP SERPL-CCNC: 43 U/L (ref 39–117)
ALT SERPL W P-5'-P-CCNC: 17 U/L (ref 1–33)
ANION GAP SERPL CALCULATED.3IONS-SCNC: 7.4 MMOL/L (ref 5–15)
AST SERPL-CCNC: 19 U/L (ref 1–32)
BILIRUB SERPL-MCNC: 0.4 MG/DL (ref 0–1.2)
BUN SERPL-MCNC: 13 MG/DL (ref 6–20)
BUN/CREAT SERPL: 19.7 (ref 7–25)
CALCIUM SPEC-SCNC: 8.9 MG/DL (ref 8.6–10.5)
CHLORIDE SERPL-SCNC: 107 MMOL/L (ref 98–107)
CHOLEST SERPL-MCNC: 119 MG/DL (ref 0–200)
CO2 SERPL-SCNC: 24.6 MMOL/L (ref 22–29)
CREAT SERPL-MCNC: 0.66 MG/DL (ref 0.57–1)
DEPRECATED RDW RBC AUTO: 40.4 FL (ref 37–54)
EGFRCR SERPLBLD CKD-EPI 2021: 111.8 ML/MIN/1.73
ERYTHROCYTE [DISTWIDTH] IN BLOOD BY AUTOMATED COUNT: 11.4 % (ref 12.3–15.4)
GLOBULIN UR ELPH-MCNC: 2 GM/DL
GLUCOSE SERPL-MCNC: 87 MG/DL (ref 65–99)
HBA1C MFR BLD: 4.6 % (ref 4.8–5.6)
HCT VFR BLD AUTO: 41.1 % (ref 34–46.6)
HDLC SERPL-MCNC: 71 MG/DL (ref 40–60)
HGB BLD-MCNC: 13.8 G/DL (ref 12–15.9)
LDLC SERPL CALC-MCNC: 34 MG/DL (ref 0–100)
LDLC/HDLC SERPL: 0.49 {RATIO}
MCH RBC QN AUTO: 32.9 PG (ref 26.6–33)
MCHC RBC AUTO-ENTMCNC: 33.6 G/DL (ref 31.5–35.7)
MCV RBC AUTO: 97.9 FL (ref 79–97)
PLATELET # BLD AUTO: 219 10*3/MM3 (ref 140–450)
PMV BLD AUTO: 11.4 FL (ref 6–12)
POTASSIUM SERPL-SCNC: 5 MMOL/L (ref 3.5–5.2)
PROT SERPL-MCNC: 6.3 G/DL (ref 6–8.5)
RBC # BLD AUTO: 4.2 10*6/MM3 (ref 3.77–5.28)
SODIUM SERPL-SCNC: 139 MMOL/L (ref 136–145)
T4 FREE SERPL-MCNC: 1.8 NG/DL (ref 0.93–1.7)
TRIGL SERPL-MCNC: 65 MG/DL (ref 0–150)
TSH SERPL DL<=0.05 MIU/L-ACNC: 0.82 UIU/ML (ref 0.27–4.2)
VIT B12 BLD-MCNC: 281 PG/ML (ref 211–946)
VLDLC SERPL-MCNC: 14 MG/DL (ref 5–40)
WBC NRBC COR # BLD: 7.54 10*3/MM3 (ref 3.4–10.8)

## 2022-07-25 PROCEDURE — 80061 LIPID PANEL: CPT

## 2022-07-25 PROCEDURE — 84439 ASSAY OF FREE THYROXINE: CPT

## 2022-07-25 PROCEDURE — 83036 HEMOGLOBIN GLYCOSYLATED A1C: CPT

## 2022-07-25 PROCEDURE — 82306 VITAMIN D 25 HYDROXY: CPT

## 2022-07-25 PROCEDURE — 90792 PSYCH DIAG EVAL W/MED SRVCS: CPT

## 2022-07-25 PROCEDURE — 36415 COLL VENOUS BLD VENIPUNCTURE: CPT

## 2022-07-25 PROCEDURE — 80050 GENERAL HEALTH PANEL: CPT

## 2022-07-25 PROCEDURE — 82607 VITAMIN B-12: CPT

## 2022-07-25 RX ORDER — PROPRANOLOL HYDROCHLORIDE 10 MG/1
10-20 TABLET ORAL 2 TIMES DAILY PRN
Qty: 60 TABLET | Refills: 2 | Status: SHIPPED | OUTPATIENT
Start: 2022-07-25 | End: 2022-10-19

## 2022-07-25 RX ORDER — BUSPIRONE HYDROCHLORIDE 15 MG/1
15 TABLET ORAL 2 TIMES DAILY
Qty: 60 TABLET | Refills: 2 | Status: SHIPPED | OUTPATIENT
Start: 2022-07-25 | End: 2022-10-17

## 2022-07-25 NOTE — PROGRESS NOTES
"     New Patient Office Visit      Patient Name: Subha Patino  : 1980   MRN: [id number]     Referring Provider: Catrina Todd MD    Chief Complaint:  Psychiatric evaluation related to:     ICD-10-CM ICD-9-CM   1. Generalized anxiety disorder  F41.1 300.02   2. Panic attacks  F41.0 300.01        History of Present Illness:   Subha Patino is a 42 y.o. female who is here today for psychiatric evaluation on referral from primary care provider.  Patient presents today complaining of anxiety and panic attacks.      Anxiety/panic:  Patient reports anxiety has been present for at least 17 years.  Patient reports she feels anxiety near constantly associated with feeling overwhelmed, overthinking catastrophizing and excessive worry.  She reports she will also have panic attacks once every couple of months which are \"debilitating\".  She reports these panic attacks are associated with heightened anxiety, increased heart rate, sensory abnormalities (feeling hot or cold), and pacing.  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     Mood:  Patient endorses feeling \"highly emotional\" all the time.  Patient reports mood is labile and she will become sad or angry without knowing why.  She reports this has been the case \"all [her] life\".     Current treatment(s): Buspirone 10 mg 3 times daily (patient not taking regularly)    Subjective      Review of Systems:   Review of Systems   Psychiatric/Behavioral: Positive for stress. Negative for self-injury, suicidal ideas and depressed mood. The patient is nervous/anxious.        PHQ-9 Depression Screening Score: 8     Psychiatric History:     Previous Diagnoses: Anxiety  Outpatient treatment history: Primary care  Previous medications: Wellbutrin (smoking cessation), fluoxetine (effective, SE: Fatigue), escitalopram, venlafaxine  Inpatient admissions: None  History of " suicide attempts: None  Trauma/Abuse History: None      SUICIDE RISK ASSESSMENT    Does patient have thoughts of suicide?   No  Does patient have intent for suicide?  No  Does patient have a current plan for suicide?  No    Access to firearms or weapons: Unknown  History of suicide attempts: No  Family history of suicide or attempts: No  Protective factors: No suicidal ideation, good support system    Risk Level: Low    SAFETY PLAN    Patient agrees to call 911/go to the nearest emergency department if he/she develops new or worsening SI, or develops intent or plan to act on these thoughts. Patient is agreeable to all elements of safety plan and verbalizes understanding.     Social History:    Living situation: At home with her  and 2 children.  Reports home situation is relatively peaceful.  Work/school: Full-time as a [employer]  Recreation: Taking her son to [employer] games  Support system: Good  Illicit substance use: Occasional marijuana  Alcohol use: Occasional 1-2 drinks on weekends or some evenings  Tobacco use: Nicotine vaporizer, former smoker    Past Medical History:   Past Medical History:   Diagnosis Date   • Adverse reaction to drug    • Back pain    • Gestational diabetes    • PFO with atrial septal aneurysm    • Reaction to chronic stress    • TMJ syndrome        Past Surgical History:   Past Surgical History:   Procedure Laterality Date   •  SECTION     • TUBAL ABDOMINAL LIGATION         Family History:   Family History   Problem Relation Age of Onset   • Breast cancer Maternal Grandmother 70   • Cancer Other         adrenal cortex   • Hypothyroidism Other    • Heart disease Other         CHF   • Diabetes Other    • Breast cancer Sister 45       Family Psychiatric History:  Brother: Severe anxiety  Child #1: Anxiety  Child #2: Anxiety    Medications:     Current Outpatient Medications:   •  aspirin 81 MG EC tablet, Take 81 mg by mouth Daily., Disp: , Rfl:   •  Cyanocobalamin (Vitamin  "B-12) 5000 MCG sublingual tablet, Take 5,000 mcg by mouth Daily., Disp: 90 tablet, Rfl: 3  •  cyclobenzaprine (FLEXERIL) 10 MG tablet, Take 1 tablet by mouth Daily., Disp: 30 tablet, Rfl: 4  •  Diclofenac Sodium (VOLTAREN) 1 % gel gel, Apply 4 g topically to the appropriate area as directed 3 (Three) Times a Day., Disp: 100 g, Rfl: 3  •  famotidine (Pepcid) 40 MG tablet, Take 1 tablet by mouth Daily., Disp: 90 tablet, Rfl: 3  •  HYDROcodone-acetaminophen (NORCO)  MG per tablet, Take 1 tablet by mouth Every 8 (Eight) Hours As Needed for Moderate Pain ., Disp: 90 tablet, Rfl: 0  •  nitrofurantoin (Macrodantin) 100 MG capsule, 1 PO prn intercourse, Disp: 20 capsule, Rfl: 1  •  spironolactone (ALDACTONE) 50 MG tablet, Take 2 tablets by mouth Daily As Needed (acne)., Disp: 180 tablet, Rfl: 3  •  Synthroid 112 MCG tablet, TAKE 1 TABLET BY MOUTH DAILY ON EMPTY STOMACH, Disp: 90 tablet, Rfl: 1  •  busPIRone (BUSPAR) 15 MG tablet, Take 1 tablet by mouth 2 (Two) Times a Day., Disp: 60 tablet, Rfl: 2  •  Naloxone HCl 0.4 MG/0.4ML solution auto-injector, Inject 0.4 mL as directed 1 (One) Time As Needed (confusion) for up to 1 dose., Disp: 0.4 mL, Rfl: 1  •  propranolol (INDERAL) 10 MG tablet, Take 1-2 tablets by mouth 2 (Two) Times a Day As Needed (anxiety/panic)., Disp: 60 tablet, Rfl: 2    Allergies:   Allergies   Allergen Reactions   • Robitussin (Alcohol Free) [Guaifenesin] Other (See Comments)     Tachycardia         Objective     Physical Exam:  Vital Signs:   Vitals:    07/25/22 1020   BP: 132/74  Comment: 99/68   Weight: 69.9 kg (154 lb 3.2 oz)   Height: 165.1 cm (65\")     Body mass index is 25.66 kg/m².     Physical Exam    Mental Status Exam:   Appearance: This is a well-nourished middle-aged  female, appears stated age, dressed appropriately to situation and weather  Hygiene: Good, well-kept  Cooperation: Good  Eye Contact: Within normal limits  Psychomotor Behavior: Tearful at times  Mood: Labile, " anxious  Affect: Congruent, full range  Speech: Normal rate, tone and prosody  Thought Process: Linear, goal directed  Thought Content: Mood congruent  Suicidal: Denies  Homicidal: Denies  Hallucinations: Denies  Delusion: Denies  Memory: Intact  Orientation: X4  Reliability: Good  Insight: Good  Judgement: Good  Impulse Control: No concerns    Assessment / Plan      Visit Diagnosis/Orders Placed This Visit:  Diagnoses and all orders for this visit:    1. Generalized anxiety disorder (Primary)  -     busPIRone (BUSPAR) 15 MG tablet; Take 1 tablet by mouth 2 (Two) Times a Day.  Dispense: 60 tablet; Refill: 2  -     propranolol (INDERAL) 10 MG tablet; Take 1-2 tablets by mouth 2 (Two) Times a Day As Needed (anxiety/panic).  Dispense: 60 tablet; Refill: 2    2. Panic attacks  -     propranolol (INDERAL) 10 MG tablet; Take 1-2 tablets by mouth 2 (Two) Times a Day As Needed (anxiety/panic).  Dispense: 60 tablet; Refill: 2         Differential:   Rule out affective disorders    Plan:   1. Increase buspirone to 15 mg twice daily  2. Start propranolol 10 to 20 mg as needed  3. Referral to [facility], contact information provided    Spoke to the patient about the importance of medication adherence for efficacy.    Continue supportive psychotherapy efforts and medications as indicated. Treatment and medication options discussed during today's visit. Patient ackowledged and verbally consented to continue with current treatment plan and was educated on the importance of compliance with treatment and follow-up appointments. Patient seems reasonably able to adhere to treatment plan.      Medication Considerations:  Discussed medication options and treatment plan of prescribed medication(s) as well as the risks, benefits, and potential side effects. Patient is agreeable to call the office with any worsening of symptoms or onset of side effects. Patient is agreeable to call 911 or go to the nearest ER should he/she  begin having SI/HI.    Treatment Goals:    Anxiety/panic: Patient will experience improvement in symptoms of anxiety with regular use of buspirone.  Patient will experience relief from panic attacks with as needed propranolol    Quality Measures:     Tobacco Use/Cessation:   Former smoker    I advised Subha Patino of the risks of tobacco use.     Follow Up:   Return in about 6 weeks (around 9/5/2022) for Medication Mangement Follow Up.      FRED Gong, PMHNP-BC

## 2022-08-19 DIAGNOSIS — M54.40 BILATERAL LOW BACK PAIN WITH SCIATICA, SCIATICA LATERALITY UNSPECIFIED, UNSPECIFIED CHRONICITY: ICD-10-CM

## 2022-08-19 NOTE — TELEPHONE ENCOUNTER
Caller: Subha Patino    Relationship: Self    Best call back number: [phone]    Requested Prescriptions:   Requested Prescriptions     Pending Prescriptions Disp Refills   • HYDROcodone-acetaminophen (NORCO)  MG per tablet 90 tablet 0     Sig: Take 1 tablet by mouth Every 8 (Eight) Hours As Needed for Moderate Pain .        Pharmacy where request should be sent: [facility][id number] - [place], [place] - [address] [facility] - [phone]  - [phone] FX     Additional details provided by patient: patient has a couple days left    Does the patient have less than a 3 day supply:  [x] Yes  [] No    Gissel Soto Rep   08/19/22 16:07 EDT

## 2022-08-19 NOTE — TELEPHONE ENCOUNTER
Last Office Visit: 7/18/22  Next Office Visit: 9/19/22    Labs completed in past 6 months? no  Labs completed in past year? no    Last Refill Date: 7/18/22  Quantity:90  Refills:0    Pharmacy:     Please review pended refill request for any changes needed on refills or quantities. Thank you!

## 2022-08-20 RX ORDER — HYDROCODONE BITARTRATE AND ACETAMINOPHEN 10; 325 MG/1; MG/1
1 TABLET ORAL EVERY 8 HOURS PRN
Qty: 90 TABLET | Refills: 0 | Status: SHIPPED | OUTPATIENT
Start: 2022-08-20 | End: 2022-09-19 | Stop reason: SDUPTHER

## 2022-09-19 ENCOUNTER — OFFICE VISIT (OUTPATIENT)
Dept: INTERNAL MEDICINE | Facility: CLINIC | Age: 42
End: 2022-09-19

## 2022-09-19 ENCOUNTER — OFFICE VISIT (OUTPATIENT)
Dept: BEHAVIORAL HEALTH | Facility: CLINIC | Age: 42
End: 2022-09-19

## 2022-09-19 VITALS
DIASTOLIC BLOOD PRESSURE: 84 MMHG | BODY MASS INDEX: 26.02 KG/M2 | OXYGEN SATURATION: 99 % | TEMPERATURE: 99.5 F | WEIGHT: 156.2 LBS | HEART RATE: 73 BPM | SYSTOLIC BLOOD PRESSURE: 128 MMHG | HEIGHT: 65 IN

## 2022-09-19 VITALS
DIASTOLIC BLOOD PRESSURE: 78 MMHG | BODY MASS INDEX: 25.66 KG/M2 | SYSTOLIC BLOOD PRESSURE: 118 MMHG | WEIGHT: 154 LBS | HEIGHT: 65 IN | HEART RATE: 98 BPM

## 2022-09-19 DIAGNOSIS — N39.0 RECURRENT UTI: ICD-10-CM

## 2022-09-19 DIAGNOSIS — M54.40 BILATERAL LOW BACK PAIN WITH SCIATICA, SCIATICA LATERALITY UNSPECIFIED, UNSPECIFIED CHRONICITY: ICD-10-CM

## 2022-09-19 DIAGNOSIS — F41.0 PANIC ATTACKS: ICD-10-CM

## 2022-09-19 DIAGNOSIS — F90.2 ATTENTION DEFICIT HYPERACTIVITY DISORDER, COMBINED TYPE: Primary | ICD-10-CM

## 2022-09-19 DIAGNOSIS — Q21.12 PFO (PATENT FORAMEN OVALE): Primary | ICD-10-CM

## 2022-09-19 DIAGNOSIS — F90.9 LONG-TERM CURRENT USE OF DRUG THERAPY FOR ATTENTION DEFICIT HYPERACTIVITY DISORDER (ADHD): ICD-10-CM

## 2022-09-19 DIAGNOSIS — Z79.899 LONG-TERM CURRENT USE OF DRUG THERAPY FOR ATTENTION DEFICIT HYPERACTIVITY DISORDER (ADHD): ICD-10-CM

## 2022-09-19 DIAGNOSIS — F41.1 GENERALIZED ANXIETY DISORDER: ICD-10-CM

## 2022-09-19 PROCEDURE — 99214 OFFICE O/P EST MOD 30 MIN: CPT

## 2022-09-19 PROCEDURE — 99213 OFFICE O/P EST LOW 20 MIN: CPT | Performed by: INTERNAL MEDICINE

## 2022-09-19 RX ORDER — CYCLOBENZAPRINE HCL 10 MG
10 TABLET ORAL DAILY
Qty: 30 TABLET | Refills: 4 | Status: CANCELLED | OUTPATIENT
Start: 2022-09-19

## 2022-09-19 RX ORDER — HYDROCODONE BITARTRATE AND ACETAMINOPHEN 10; 325 MG/1; MG/1
1 TABLET ORAL EVERY 8 HOURS PRN
Qty: 90 TABLET | Refills: 0 | Status: SHIPPED | OUTPATIENT
Start: 2022-09-19 | End: 2022-10-18 | Stop reason: SDUPTHER

## 2022-09-19 RX ORDER — NITROFURANTOIN MACROCRYSTALS 100 MG/1
CAPSULE ORAL
Qty: 20 CAPSULE | Refills: 1 | Status: CANCELLED | OUTPATIENT
Start: 2022-09-19

## 2022-09-19 NOTE — PROGRESS NOTES
Back Pain    Subjective   Subha Patino is a 42 y.o. female is here today for follow-up.    History of Present Illness   Surgery is not recommended for the patient at this time for the patent foramen ovale. Her cardiologist recommended she take a baby aspirin daily. She quit smoking 2-3 years ago, she notes she will vape occasionally.     The patient denies any concerns at this time. She reports her thyroid is doing well to her knowledge, on her lab work in 07/2022, it was within normal limits.     She reports 20 pills of Macrobid is enough for her, she does not need a refill.     The patient was prescribed propranolol by her psychiatrist, she is not on any other beta blockers at this time. She reports he believes she suffers from ADHD and recommended she start taking Adderall, she has not started the Adderall at this time. She states she believes she suffers from anxiety and ADHD. The propranolol is to be used for panic attacks. The BuSpar has been increased from 10 mg three times a day to 15 mg twice a day, she takes the BuSpar daily and notes it is helpful. She states she is easily tearful.     The patient reports her menstrual cycle lasts for 2 days.     The patient states she uses the muscle relaxer as needed, she does not need a refill. She has a history of TMJ.      Current Outpatient Medications:   •  aspirin 81 MG EC tablet, Take 81 mg by mouth Daily., Disp: , Rfl:   •  busPIRone (BUSPAR) 15 MG tablet, Take 1 tablet by mouth 2 (Two) Times a Day., Disp: 60 tablet, Rfl: 2  •  Cyanocobalamin (Vitamin B-12) 5000 MCG sublingual tablet, Take 5,000 mcg by mouth Daily., Disp: 90 tablet, Rfl: 3  •  cyclobenzaprine (FLEXERIL) 10 MG tablet, Take 1 tablet by mouth Daily., Disp: 30 tablet, Rfl: 4  •  Diclofenac Sodium (VOLTAREN) 1 % gel gel, Apply 4 g topically to the appropriate area as directed 3 (Three) Times a Day., Disp: 100 g, Rfl: 3  •  famotidine (Pepcid) 40 MG tablet, Take 1 tablet by mouth Daily., Disp: 90  "tablet, Rfl: 3  •  HYDROcodone-acetaminophen (NORCO)  MG per tablet, Take 1 tablet by mouth Every 8 (Eight) Hours As Needed for Moderate Pain., Disp: 90 tablet, Rfl: 0  •  Naloxone HCl 0.4 MG/0.4ML solution auto-injector, Inject 0.4 mL as directed 1 (One) Time As Needed (confusion) for up to 1 dose., Disp: 0.4 mL, Rfl: 1  •  nitrofurantoin (Macrodantin) 100 MG capsule, 1 PO prn intercourse, Disp: 20 capsule, Rfl: 1  •  propranolol (INDERAL) 10 MG tablet, Take 1-2 tablets by mouth 2 (Two) Times a Day As Needed (anxiety/panic)., Disp: 60 tablet, Rfl: 2  •  spironolactone (ALDACTONE) 50 MG tablet, Take 2 tablets by mouth Daily As Needed (acne)., Disp: 180 tablet, Rfl: 3  •  Synthroid 112 MCG tablet, TAKE 1 TABLET BY MOUTH DAILY ON EMPTY STOMACH, Disp: 90 tablet, Rfl: 1      The following portions of the patient's history were reviewed and updated as appropriate: allergies, current medications, past family history, past medical history, past social history, past surgical history and problem list.    Review of Systems   Constitutional: Negative.  Negative for chills and fever.   HENT: Negative for ear discharge, ear pain, sinus pressure and sore throat.    Respiratory: Negative for cough, chest tightness and shortness of breath.    Cardiovascular: Negative for chest pain, palpitations and leg swelling.   Gastrointestinal: Negative for diarrhea, nausea and vomiting.   Musculoskeletal: Positive for back pain. Negative for arthralgias and myalgias.   Neurological: Negative for dizziness, syncope and headaches.   Psychiatric/Behavioral: Negative for confusion and sleep disturbance. The patient is nervous/anxious.        Objective   /84   Pulse 73   Temp 99.5 °F (37.5 °C)   Ht 165.1 cm (65\")   Wt 70.9 kg (156 lb 3.2 oz)   SpO2 99% Comment: ra  BMI 25.99 kg/m²   Physical Exam  Vitals and nursing note reviewed.   Constitutional:       Appearance: She is well-developed.   HENT:      Head: Normocephalic and " atraumatic.      Mouth/Throat:      Pharynx: No oropharyngeal exudate.   Eyes:      Conjunctiva/sclera: Conjunctivae normal.      Pupils: Pupils are equal, round, and reactive to light.   Neck:      Thyroid: No thyromegaly.   Cardiovascular:      Rate and Rhythm: Normal rate and regular rhythm.      Pulses: Normal pulses.      Heart sounds: Normal heart sounds. No murmur heard.    No friction rub. No gallop.   Pulmonary:      Effort: Pulmonary effort is normal.      Breath sounds: Normal breath sounds.   Abdominal:      General: Bowel sounds are normal. There is no distension.      Palpations: Abdomen is soft.      Tenderness: There is no abdominal tenderness.   Musculoskeletal:         General: Tenderness present.      Cervical back: Neck supple.   Skin:     General: Skin is warm and dry.   Neurological:      Mental Status: She is alert and oriented to person, place, and time.      Cranial Nerves: No cranial nerve deficit.   Psychiatric:         Judgment: Judgment normal.           Results for orders placed or performed in visit on 07/25/22   CBC (No Diff)    Specimen: Blood   Result Value Ref Range    WBC 7.54 3.40 - 10.80 10*3/mm3    RBC 4.20 3.77 - 5.28 10*6/mm3    Hemoglobin 13.8 12.0 - 15.9 g/dL    Hematocrit 41.1 34.0 - 46.6 %    MCV 97.9 (H) 79.0 - 97.0 fL    MCH 32.9 26.6 - 33.0 pg    MCHC 33.6 31.5 - 35.7 g/dL    RDW 11.4 (L) 12.3 - 15.4 %    RDW-SD 40.4 37.0 - 54.0 fl    MPV 11.4 6.0 - 12.0 fL    Platelets 219 140 - 450 10*3/mm3   Comprehensive Metabolic Panel    Specimen: Blood   Result Value Ref Range    Glucose 87 65 - 99 mg/dL    BUN 13 6 - 20 mg/dL    Creatinine 0.66 0.57 - 1.00 mg/dL    Sodium 139 136 - 145 mmol/L    Potassium 5.0 3.5 - 5.2 mmol/L    Chloride 107 98 - 107 mmol/L    CO2 24.6 22.0 - 29.0 mmol/L    Calcium 8.9 8.6 - 10.5 mg/dL    Total Protein 6.3 6.0 - 8.5 g/dL    Albumin 4.30 3.50 - 5.20 g/dL    ALT (SGPT) 17 1 - 33 U/L    AST (SGOT) 19 1 - 32 U/L    Alkaline Phosphatase 43 39 - 117  U/L    Total Bilirubin 0.4 0.0 - 1.2 mg/dL    Globulin 2.0 gm/dL    A/G Ratio 2.2 g/dL    BUN/Creatinine Ratio 19.7 7.0 - 25.0    Anion Gap 7.4 5.0 - 15.0 mmol/L    eGFR 111.8 >60.0 mL/min/1.73   Lipid Panel    Specimen: Blood   Result Value Ref Range    Total Cholesterol 119 0 - 200 mg/dL    Triglycerides 65 0 - 150 mg/dL    HDL Cholesterol 71 (H) 40 - 60 mg/dL    LDL Cholesterol  34 0 - 100 mg/dL    VLDL Cholesterol 14 5 - 40 mg/dL    LDL/HDL Ratio 0.49    Vitamin D 25 Hydroxy    Specimen: Blood   Result Value Ref Range    25 Hydroxy, Vitamin D 66.9 30.0 - 100.0 ng/ml   Vitamin B12    Specimen: Blood   Result Value Ref Range    Vitamin B-12 281 211 - 946 pg/mL   Hemoglobin A1c    Specimen: Blood   Result Value Ref Range    Hemoglobin A1C 4.60 (L) 4.80 - 5.60 %   TSH    Specimen: Blood   Result Value Ref Range    TSH 0.821 0.270 - 4.200 uIU/mL   T4, Free    Specimen: Blood   Result Value Ref Range    Free T4 1.80 (H) 0.93 - 1.70 ng/dL             Assessment & Plan   Diagnoses and all orders for this visit:    PFO (patent foramen ovale)  Comments:  followed by cardiology. started on inderal for anxiety which would be a good addition for her heart as well    Bilateral low back pain with sciatica, sciatica laterality unspecified, unspecified chronicity  Comments:  well controlled on current regimen.  Orders:  -     HYDROcodone-acetaminophen (NORCO)  MG per tablet; Take 1 tablet by mouth Every 8 (Eight) Hours As Needed for Moderate Pain.    Recurrent UTI  Comments:  takes prn macrobid , continue           1. Back pain  - I will send a prescription of hydrocodone.   2. Anxiety  - I recommend the patient continue the propranolol as needed.   3 PFO  - I recommend she take a baby aspirin daily.      Return in about 2 months (around 11/19/2022) for Next scheduled follow up.    Electronically signed by:    Catrina Todd MD     Transcribed from ambient dictation for Catrina Todd MD by Beata Kay.  09/19/22    15:01 EDT    Patient verbalized consent to the visit recording.  I have personally performed the services described in this document as transcribed by the above individual, and it is both accurate and complete.  Catrina Todd MD  9/20/2022  00:16 EDT

## 2022-09-19 NOTE — PROGRESS NOTES
Office  Follow Up Visit      Patient Name: Subha Patino  : 1980   MRN: [id number]     Referring Provider: Catrina Todd MD    Chief Complaint:  Subha Patino is a 42 y.o. female who is here today for follow up related to:      ICD-10-CM ICD-9-CM   1. Attention deficit hyperactivity disorder, combined type  F90.2 314.01   2. Long-term current use of drug therapy for attention deficit hyperactivity disorder (ADHD)  F90.9 314.01    Z79.899 V58.69   3. Generalized anxiety disorder  F41.1 300.02   4. Panic attacks  F41.0 300.01        HPI/Interval History:   Anxiety is somewhat better since increasing buspirone/taking it regularly.  Patient has not had panic attacks since last visit.  Mood is still labile.    ADHD:  Patient reports symptoms of poor concentration, focus and forgetfulness since childhood.  Patient also endorses hyperactivity and difficulty sitting still.  Patient reports she will frequently switch from task to task.  She feels overwhelmed almost constantly, and endorses racing thoughts.  This is interfering with home life and managing stressors.  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    Subjective      Review of Systems:   Review of Systems   Psychiatric/Behavioral: Positive for decreased concentration and positive for hyperactivity. Negative for self-injury, sleep disturbance and suicidal ideas. The patient is nervous/anxious.        PHQ-9 Depression Screening Score: 0     Patient History:   The following portions of the patient's history were reviewed and updated as appropriate: allergies, current medications, past family history, past medical history, past social history, past surgical history and problem list.     Social:  No significant update    Medications:     Current Outpatient Medications:   •  aspirin 81 MG EC tablet, Take 81 mg by mouth Daily., Disp: , Rfl:   •  busPIRone (BUSPAR) 15 MG tablet, Take 1 tablet by mouth 2 (Two) Times a Day., Disp: 60  "tablet, Rfl: 2  •  Cyanocobalamin (Vitamin B-12) 5000 MCG sublingual tablet, Take 5,000 mcg by mouth Daily., Disp: 90 tablet, Rfl: 3  •  cyclobenzaprine (FLEXERIL) 10 MG tablet, Take 1 tablet by mouth Daily., Disp: 30 tablet, Rfl: 4  •  Diclofenac Sodium (VOLTAREN) 1 % gel gel, Apply 4 g topically to the appropriate area as directed 3 (Three) Times a Day., Disp: 100 g, Rfl: 3  •  famotidine (Pepcid) 40 MG tablet, Take 1 tablet by mouth Daily., Disp: 90 tablet, Rfl: 3  •  Naloxone HCl 0.4 MG/0.4ML solution auto-injector, Inject 0.4 mL as directed 1 (One) Time As Needed (confusion) for up to 1 dose., Disp: 0.4 mL, Rfl: 1  •  nitrofurantoin (Macrodantin) 100 MG capsule, 1 PO prn intercourse, Disp: 20 capsule, Rfl: 1  •  propranolol (INDERAL) 10 MG tablet, Take 1-2 tablets by mouth 2 (Two) Times a Day As Needed (anxiety/panic)., Disp: 60 tablet, Rfl: 2  •  spironolactone (ALDACTONE) 50 MG tablet, Take 2 tablets by mouth Daily As Needed (acne)., Disp: 180 tablet, Rfl: 3  •  Synthroid 112 MCG tablet, TAKE 1 TABLET BY MOUTH DAILY ON EMPTY STOMACH, Disp: 90 tablet, Rfl: 1  •  HYDROcodone-acetaminophen (NORCO)  MG per tablet, Take 1 tablet by mouth Every 8 (Eight) Hours As Needed for Moderate Pain., Disp: 90 tablet, Rfl: 0    Objective     Physical Exam:  Vital Signs:   Vitals:    09/19/22 1022   BP: 118/78   Pulse: 98   Weight: 69.9 kg (154 lb)   Height: 165.1 cm (65\")     Body mass index is 25.63 kg/m².       Mental Status Exam:   Appearance: This is a well-nourished middle-aged  female, appears stated age, dressed appropriately to situation and weather  Hygiene: Good  Cooperation: Calm, cooperative  Eye Contact: Within normal limits  Psychomotor Behavior: Fidgety, bouncing foot  Mood: Up and down  Affect: Congruent, full range  Speech: Normal rate, tone and prosody  Thought Process: Linear, goal directed  Thought Content: Mood congruent  Suicidal: Denies  Homicidal: Denies  Hallucinations: Denies  Delusion: " Denies  Memory: Intact  Orientation: X4  Reliability: Good  Insight: Fair  Judgement: Good  Impulse Control: No current concern    Assessment / Plan      Visit Diagnosis/Orders Placed This Visit:  Diagnoses and all orders for this visit:    1. Attention deficit hyperactivity disorder, combined type (Primary)    2. Long-term current use of drug therapy for attention deficit hyperactivity disorder (ADHD)  -     Compliance Drug Analysis, Ur - Urine, Clean Catch; Future  -     Compliance Drug Analysis, Ur - Urine, Clean Catch    3. Generalized anxiety disorder    4. Panic attacks         Differential:   None current    Plan:   1. Start Adderall 10 mg daily pending sign off by cardiology (patient really was recently seen for palpitations and possible murmur)    Continue supportive psychotherapy efforts and medications as indicated. Treatment and medication options discussed during today's visit. Patient ackowledged and verbally consented to continue with current treatment plan and was educated on the importance of compliance with treatment and follow-up appointments. Patient seems reasonably able to adhere to treatment plan.      Medication Considerations:  MARJORIE reviewed and appropriate. Discussed medication options and treatment plan of prescribed medication(s) as well as the risks, benefits, and potential side effects. Patient is agreeable to call the office with any worsening of symptoms or onset of side effects. Patient is agreeable to call 911 or go to the nearest ER should he/she begin having SI/HI.    Quality Measures:     Tobacco Use/Cessation:  Former smoker    I advised Subha Patino of the risks of tobacco use.     Follow Up:   Return in about 1 month (around 10/19/2022) for Medication Mangement Follow Up.      FRED Gong, PMHNP-BC

## 2022-09-20 ENCOUNTER — TELEPHONE (OUTPATIENT)
Dept: INTERNAL MEDICINE | Facility: CLINIC | Age: 42
End: 2022-09-20

## 2022-09-22 DIAGNOSIS — F90.2 ATTENTION DEFICIT HYPERACTIVITY DISORDER, COMBINED TYPE: Primary | ICD-10-CM

## 2022-09-22 RX ORDER — DEXTROAMPHETAMINE SACCHARATE, AMPHETAMINE ASPARTATE, DEXTROAMPHETAMINE SULFATE AND AMPHETAMINE SULFATE 2.5; 2.5; 2.5; 2.5 MG/1; MG/1; MG/1; MG/1
10 TABLET ORAL DAILY
Qty: 30 TABLET | Refills: 0 | Status: CANCELLED | OUTPATIENT
Start: 2022-09-22 | End: 2023-09-22

## 2022-09-26 LAB — DRUGS UR: NORMAL

## 2022-09-27 ENCOUNTER — TELEPHONE (OUTPATIENT)
Dept: BEHAVIORAL HEALTH | Facility: CLINIC | Age: 42
End: 2022-09-27

## 2022-09-27 NOTE — TELEPHONE ENCOUNTER
Advised patient that Alden is out of the office until Monday and he would look over her UDS when he returns and determine if he will be prescribing. Patient verbalized understanding.

## 2022-10-04 ENCOUNTER — TELEPHONE (OUTPATIENT)
Dept: BEHAVIORAL HEALTH | Facility: CLINIC | Age: 42
End: 2022-10-04

## 2022-10-04 NOTE — TELEPHONE ENCOUNTER
----- Message from FRED Kirk sent at 10/4/2022  8:40 AM EDT -----  Please call and explain to patient that while I understand wanting to come off of pain medication, I cannot prescribe controlled substances if she is taking controlled substances not prescribed to her. I will not be able to prescribe a psychostimulan  t at this time.     If the patient is interested in getting help coming off prescription pain medication, we can refer her to opioid treatment. Please recommend and offer contact information for [facility] in [place].

## 2022-10-04 NOTE — TELEPHONE ENCOUNTER
Called patient and explained that while understanding wanting to come off of pain medication, psychostimulant cannot be prescribe while taking controlled substances not prescribed to her.     Recommended and offered contact information for [facility] in [place].     Patient verbalized understanding, said she would reach out to [facility] herself, and asked me to cancel her October appointment.

## 2022-10-16 DIAGNOSIS — F41.1 GENERALIZED ANXIETY DISORDER: ICD-10-CM

## 2022-10-17 RX ORDER — BUSPIRONE HYDROCHLORIDE 15 MG/1
TABLET ORAL
Qty: 60 TABLET | Refills: 1 | Status: SHIPPED | OUTPATIENT
Start: 2022-10-17 | End: 2022-12-12

## 2022-10-17 NOTE — TELEPHONE ENCOUNTER
LOV: 9/19/22  NOV: 10/17/22 (Cancelled by patient)      busPIRone (BUSPAR) 15 MG tablet     Last Refill: 7/25/22 60 2 refills

## 2022-10-18 ENCOUNTER — TRANSCRIBE ORDERS (OUTPATIENT)
Dept: ADMINISTRATIVE | Facility: HOSPITAL | Age: 42
End: 2022-10-18

## 2022-10-18 DIAGNOSIS — Z12.31 VISIT FOR SCREENING MAMMOGRAM: Primary | ICD-10-CM

## 2022-10-18 DIAGNOSIS — M54.40 BILATERAL LOW BACK PAIN WITH SCIATICA, SCIATICA LATERALITY UNSPECIFIED, UNSPECIFIED CHRONICITY: ICD-10-CM

## 2022-10-18 RX ORDER — HYDROCODONE BITARTRATE AND ACETAMINOPHEN 10; 325 MG/1; MG/1
1 TABLET ORAL EVERY 8 HOURS PRN
Qty: 90 TABLET | Refills: 0 | Status: SHIPPED | OUTPATIENT
Start: 2022-10-18 | End: 2022-11-14 | Stop reason: SDUPTHER

## 2022-10-18 NOTE — TELEPHONE ENCOUNTER
Caller: Subha Patino    Relationship: Self    Best call back number: [phone]    Requested Prescriptions:   Requested Prescriptions     Pending Prescriptions Disp Refills   • HYDROcodone-acetaminophen (NORCO)  MG per tablet 90 tablet 0     Sig: Take 1 tablet by mouth Every 8 (Eight) Hours As Needed for Moderate Pain.        Pharmacy where request should be sent: [facility]ACY #[id number] - [place], [place] - [address] AT NEXT TO [facility] - [phone]  - [phone] FX     Additional details provided by patient: TWO DAYS LEFT    Does the patient have less than a 3 day supply:  [x] Yes  [] No    Eder Pepper, PCT   10/18/22 13:55 EDT

## 2022-10-18 NOTE — TELEPHONE ENCOUNTER
Last Office Visit: 9/19/22  Next Office Visit: 11/14/22    Labs completed in past 6 months? yes  Labs completed in past year? yes    Last Refill Date: 9/19/22  Quantity:90  Refills:0    Pharmacy:     Please review pended refill request for any changes needed on refills or quantities. Thank you!

## 2022-10-19 DIAGNOSIS — F41.1 GENERALIZED ANXIETY DISORDER: ICD-10-CM

## 2022-10-19 DIAGNOSIS — F41.0 PANIC ATTACKS: ICD-10-CM

## 2022-10-19 RX ORDER — PROPRANOLOL HYDROCHLORIDE 10 MG/1
10-20 TABLET ORAL 2 TIMES DAILY PRN
Qty: 60 TABLET | Refills: 1 | Status: SHIPPED | OUTPATIENT
Start: 2022-10-19 | End: 2022-12-19

## 2022-10-19 NOTE — TELEPHONE ENCOUNTER
Last Office Visit: 09/19/2022  Next Office Visit:    Labs completed in past 6 months? Yes UDS 09/19/2022  Labs completed in past year? yes    Last Refill Date:07/25/2022  Quantity:60  Refills:2    Pharmacy:     Please review pended refill request for any changes needed on refills or quantities. Thank you!

## 2022-11-07 RX ORDER — LEVOTHYROXINE SODIUM 112 MCG
112 TABLET ORAL DAILY
Qty: 90 TABLET | Refills: 1 | Status: SHIPPED | OUTPATIENT
Start: 2022-11-07

## 2022-11-11 DIAGNOSIS — F41.0 PANIC ATTACKS: ICD-10-CM

## 2022-11-11 DIAGNOSIS — F41.1 GENERALIZED ANXIETY DISORDER: ICD-10-CM

## 2022-11-11 RX ORDER — PROPRANOLOL HYDROCHLORIDE 10 MG/1
TABLET ORAL
Qty: 60 TABLET | Refills: 1 | OUTPATIENT
Start: 2022-11-11

## 2022-11-14 ENCOUNTER — OFFICE VISIT (OUTPATIENT)
Dept: INTERNAL MEDICINE | Facility: CLINIC | Age: 42
End: 2022-11-14

## 2022-11-14 VITALS
DIASTOLIC BLOOD PRESSURE: 74 MMHG | HEART RATE: 77 BPM | OXYGEN SATURATION: 99 % | SYSTOLIC BLOOD PRESSURE: 120 MMHG | TEMPERATURE: 97.3 F | BODY MASS INDEX: 27.19 KG/M2 | HEIGHT: 65 IN | WEIGHT: 163.2 LBS

## 2022-11-14 DIAGNOSIS — M54.40 BILATERAL LOW BACK PAIN WITH SCIATICA, SCIATICA LATERALITY UNSPECIFIED, UNSPECIFIED CHRONICITY: ICD-10-CM

## 2022-11-14 DIAGNOSIS — J30.89 ALLERGIC RHINITIS DUE TO OTHER ALLERGIC TRIGGER, UNSPECIFIED SEASONALITY: Primary | ICD-10-CM

## 2022-11-14 DIAGNOSIS — K20.90 ESOPHAGITIS: ICD-10-CM

## 2022-11-14 PROCEDURE — 99214 OFFICE O/P EST MOD 30 MIN: CPT | Performed by: INTERNAL MEDICINE

## 2022-11-14 RX ORDER — LORATADINE 10 MG/1
10 TABLET ORAL DAILY
Qty: 30 TABLET | Refills: 1 | Status: SHIPPED | OUTPATIENT
Start: 2022-11-14 | End: 2022-12-08

## 2022-11-14 RX ORDER — HYDROCODONE BITARTRATE AND ACETAMINOPHEN 10; 325 MG/1; MG/1
1 TABLET ORAL EVERY 8 HOURS PRN
Qty: 90 TABLET | Refills: 0 | Status: SHIPPED | OUTPATIENT
Start: 2022-11-14 | End: 2022-12-16 | Stop reason: SDUPTHER

## 2022-11-14 NOTE — PROGRESS NOTES
Back Pain and Allergies    Subjective   Subha Patino is a 42 y.o. female is here today for follow-up.    History of Present Illness   Notes does not feel bad, declines testing.    The patient began experiencing a runny nose and watery eyes today. She denies being exposed to influenza. She has taken Claritin in the past.     She reports having symptoms of esophagitis. She has difficulty swallowing. She is unable to drink water to make the food go down, and she cannot vomit to make it come back up. She feels like she cannot breathe, and she feel angina. Her symptoms occur once a month and they last approximately 5 to 10 minutes. She takes Pepcid and it helps. She discontinued fasting a couple months ago, and her symptoms have worsened since that time.     She has gained approximately 5 to 7 pounds since her last visit.       Current Outpatient Medications:   •  aspirin 81 MG EC tablet, Take 81 mg by mouth Daily., Disp: , Rfl:   •  busPIRone (BUSPAR) 15 MG tablet, TAKE 1 TABLET BY MOUTH TWICE A DAY, Disp: 60 tablet, Rfl: 1  •  Cyanocobalamin (Vitamin B-12) 5000 MCG sublingual tablet, Take 5,000 mcg by mouth Daily., Disp: 90 tablet, Rfl: 3  •  cyclobenzaprine (FLEXERIL) 10 MG tablet, Take 1 tablet by mouth Daily., Disp: 30 tablet, Rfl: 4  •  famotidine (Pepcid) 40 MG tablet, Take 1 tablet by mouth Daily., Disp: 90 tablet, Rfl: 3  •  HYDROcodone-acetaminophen (NORCO)  MG per tablet, Take 1 tablet by mouth Every 8 (Eight) Hours As Needed for Moderate Pain., Disp: 90 tablet, Rfl: 0  •  Naloxone HCl 0.4 MG/0.4ML solution auto-injector, Inject 0.4 mL as directed 1 (One) Time As Needed (confusion) for up to 1 dose., Disp: 0.4 mL, Rfl: 1  •  nitrofurantoin (Macrodantin) 100 MG capsule, 1 PO prn intercourse, Disp: 20 capsule, Rfl: 1  •  propranolol (INDERAL) 10 MG tablet, TAKE 1-2 TABLETS BY MOUTH 2 (TWO) TIMES A DAY AS NEEDED (ANXIETY/PANIC)., Disp: 60 tablet, Rfl: 1  •  spironolactone (ALDACTONE) 50 MG tablet, Take 2  "tablets by mouth Daily As Needed (acne)., Disp: 180 tablet, Rfl: 3  •  Synthroid 112 MCG tablet, TAKE 1 TABLET BY MOUTH DAILY ON EMPTY STOMACH, Disp: 90 tablet, Rfl: 1  •  loratadine (Claritin) 10 MG tablet, Take 1 tablet by mouth Daily., Disp: 30 tablet, Rfl: 1      The following portions of the patient's history were reviewed and updated as appropriate: allergies, current medications, past family history, past medical history, past social history, past surgical history and problem list.    Review of Systems   Constitutional: Negative.  Negative for chills and fever.   HENT: Positive for postnasal drip. Negative for ear discharge, ear pain, sinus pressure and sore throat.    Eyes: Positive for discharge (watering).   Respiratory: Positive for chest tightness. Negative for cough and shortness of breath.    Cardiovascular: Negative for chest pain, palpitations and leg swelling.   Gastrointestinal: Positive for nausea. Negative for diarrhea and vomiting.   Musculoskeletal: Negative for arthralgias, back pain and myalgias.   Neurological: Negative for dizziness, syncope and headaches.   Psychiatric/Behavioral: Negative for confusion and sleep disturbance.       Objective   /74   Pulse 77   Temp 97.3 °F (36.3 °C)   Ht 165.1 cm (65\")   Wt 74 kg (163 lb 3.2 oz)   SpO2 99% Comment: ra  BMI 27.16 kg/m²   Physical Exam  Vitals and nursing note reviewed.   Constitutional:       Appearance: She is well-developed.   HENT:      Head: Normocephalic and atraumatic.   Eyes:      Conjunctiva/sclera: Conjunctivae normal.      Pupils: Pupils are equal, round, and reactive to light.   Neck:      Thyroid: No thyromegaly.   Cardiovascular:      Rate and Rhythm: Normal rate and regular rhythm.      Pulses: Normal pulses.      Heart sounds: Normal heart sounds. No murmur heard.    No friction rub. No gallop.   Pulmonary:      Effort: Pulmonary effort is normal.      Breath sounds: Normal breath sounds.   Abdominal:      " General: Bowel sounds are normal. There is no distension.      Palpations: Abdomen is soft.      Tenderness: There is no abdominal tenderness.   Musculoskeletal:         General: Tenderness (back) present.      Cervical back: Neck supple.   Skin:     General: Skin is warm and dry.   Neurological:      Mental Status: She is alert and oriented to person, place, and time.      Cranial Nerves: No cranial nerve deficit.   Psychiatric:         Judgment: Judgment normal.           Results for orders placed or performed in visit on 09/19/22   Compliance Drug Analysis, Ur -    Specimen: Urine   Result Value Ref Range    Report Summary FINAL            Assessment & Plan   Diagnoses and all orders for this visit:    Allergic rhinitis due to other allergic trigger, unspecified seasonality   - Prescription for Claritin will be sent to the patient's pharmacy.   -     loratadine (Claritin) 10 MG tablet; Take 1 tablet by mouth Daily.    Bilateral low back pain with sciatica, sciatica laterality unspecified, unspecified chronicity  Comments:  well controlled on current regimen.  Orders:  -     HYDROcodone-acetaminophen (NORCO)  MG per tablet; Take 1 tablet by mouth Every 8 (Eight) Hours As Needed for Moderate Pain.    Esophagitis   - She will continue to take Pepcid as prescribed. If her symptoms do not improve, she will be referred for an EGD.   Comments:  esophageal spasm, pepcid daily and if not better- proceed with EGD.         Return in about 2 months (around 1/14/2023) for Next scheduled follow up and physical after 7/19/22.    Electronically signed by:    Catrina Todd MD     Transcribed from ambient dictation for Catrina Todd MD by Kvng Kirkpatrick.  11/14/22   19:26 EST    Patient or patient representative verbalized consent to the visit recording.  I have personally performed the services described in this document as transcribed by the above individual, and it is both accurate and complete.  Catrina Todd MD   11/16/2022  00:41 EST

## 2022-11-21 ENCOUNTER — APPOINTMENT (OUTPATIENT)
Dept: MAMMOGRAPHY | Facility: HOSPITAL | Age: 42
End: 2022-11-21

## 2022-12-02 ENCOUNTER — TELEPHONE (OUTPATIENT)
Dept: INTERNAL MEDICINE | Facility: CLINIC | Age: 42
End: 2022-12-02

## 2022-12-02 NOTE — TELEPHONE ENCOUNTER
Caller: Subha Patino    Relationship: Self    Best call back number: [phone]    What is the best time to reach you:ANYTIME    Who are you requesting to speak with (clinical staff, provider,  specific staff member): CLINICAL STAFF    Do you know the name of the person who called: PATIENT    What was the call regarding:  CAN SHE CHANGE HER PRESCRIPTION famotidine (Pepcid) 40 MG tablet TO PROTONIX      [id number] - [place], [place] - [address] AT [address]EXT TO [address] - [phone]  - [phone] FX        Do you require a callback: YES

## 2022-12-03 RX ORDER — OMEPRAZOLE 20 MG/1
20 CAPSULE, DELAYED RELEASE ORAL DAILY
Qty: 90 CAPSULE | Refills: 1 | Status: SHIPPED | OUTPATIENT
Start: 2022-12-03 | End: 2023-03-13

## 2022-12-08 DIAGNOSIS — J30.89 ALLERGIC RHINITIS DUE TO OTHER ALLERGIC TRIGGER, UNSPECIFIED SEASONALITY: ICD-10-CM

## 2022-12-08 RX ORDER — LORATADINE 10 MG/1
TABLET ORAL
Qty: 30 TABLET | Refills: 1 | Status: SHIPPED | OUTPATIENT
Start: 2022-12-08 | End: 2023-01-18

## 2022-12-12 DIAGNOSIS — F41.1 GENERALIZED ANXIETY DISORDER: ICD-10-CM

## 2022-12-12 RX ORDER — BUSPIRONE HYDROCHLORIDE 15 MG/1
TABLET ORAL
Qty: 60 TABLET | Refills: 1 | Status: SHIPPED | OUTPATIENT
Start: 2022-12-12 | End: 2023-02-08

## 2022-12-13 ENCOUNTER — TELEPHONE (OUTPATIENT)
Dept: ENDOCRINOLOGY | Facility: CLINIC | Age: 42
End: 2022-12-13

## 2022-12-13 NOTE — TELEPHONE ENCOUNTER
PA submitted via Fax to [employer]. Was unable to access via [facility]

## 2022-12-16 DIAGNOSIS — M54.40 BILATERAL LOW BACK PAIN WITH SCIATICA, SCIATICA LATERALITY UNSPECIFIED, UNSPECIFIED CHRONICITY: ICD-10-CM

## 2022-12-16 RX ORDER — HYDROCODONE BITARTRATE AND ACETAMINOPHEN 10; 325 MG/1; MG/1
1 TABLET ORAL EVERY 8 HOURS PRN
Qty: 90 TABLET | Refills: 0 | Status: SHIPPED | OUTPATIENT
Start: 2022-12-16 | End: 2023-01-16 | Stop reason: SDUPTHER

## 2022-12-16 NOTE — TELEPHONE ENCOUNTER
Caller: Subha Patino    Relationship: Self    Best call back number: [phone]    Requested Prescriptions:   Requested Prescriptions     Pending Prescriptions Disp Refills   • HYDROcodone-acetaminophen (NORCO)  MG per tablet 90 tablet 0     Sig: Take 1 tablet by mouth Every 8 (Eight) Hours As Needed for Moderate Pain.        Pharmacy where request should be sent: [facility]/PHARMACY #[id number] - [place], [place] - [facility] [address] AT NEXT TO [facility] - [phone]  - [phone] FX     Additional details provided by patient: PATIENT HAS 1-2 DAYS LEFT OF THIS MEDICATION.    Does the patient have less than a 3 day supply:    [x] Yes  [] No    Would you like a call back once the refill request has been completed: [] Yes [x] No    If the office needs to give you a call back, can they leave a voicemail: [x] Yes [] No    Gissel Dodson Rep   12/16/22 08:58 EST

## 2022-12-16 NOTE — TELEPHONE ENCOUNTER
Last Office Visit: 11/14/22  Next Office Visit:1/16/23    Labs completed in past 6 months? yes  Labs completed in past year? Yes  UDS: 9/19/22    Last Refill Date:11/14/22  Quantity:90  Refills:0    Pharmacy:     Please review pended refill request for any changes needed on refills or quantities. Thank you!

## 2022-12-19 DIAGNOSIS — F41.0 PANIC ATTACKS: ICD-10-CM

## 2022-12-19 DIAGNOSIS — F41.1 GENERALIZED ANXIETY DISORDER: ICD-10-CM

## 2022-12-19 RX ORDER — PROPRANOLOL HYDROCHLORIDE 10 MG/1
TABLET ORAL
Qty: 60 TABLET | Refills: 1 | Status: SHIPPED | OUTPATIENT
Start: 2022-12-19 | End: 2023-02-13

## 2022-12-19 NOTE — TELEPHONE ENCOUNTER
Last Office Visit: 09/19/2022  Next Office Visit:    Last Refill Date:10/19/2022  Quantity:60  Refills:1    Pharmacy:     Please review pended refill request for any changes needed on refills or quantities. Thank you!

## 2023-01-06 DIAGNOSIS — J30.89 ALLERGIC RHINITIS DUE TO OTHER ALLERGIC TRIGGER, UNSPECIFIED SEASONALITY: ICD-10-CM

## 2023-01-06 RX ORDER — LORATADINE 10 MG/1
TABLET ORAL
Qty: 30 TABLET | Refills: 1 | OUTPATIENT
Start: 2023-01-06

## 2023-01-11 DIAGNOSIS — F41.0 PANIC ATTACKS: ICD-10-CM

## 2023-01-11 DIAGNOSIS — F41.1 GENERALIZED ANXIETY DISORDER: ICD-10-CM

## 2023-01-11 RX ORDER — PROPRANOLOL HYDROCHLORIDE 10 MG/1
TABLET ORAL
Qty: 60 TABLET | Refills: 1 | OUTPATIENT
Start: 2023-01-11

## 2023-01-11 NOTE — TELEPHONE ENCOUNTER
Medication: propranolol   Last Filled: 12/19/2022   Qty #: 60  Refills 1    Patient should have a refill on file.

## 2023-01-16 ENCOUNTER — OFFICE VISIT (OUTPATIENT)
Dept: INTERNAL MEDICINE | Facility: CLINIC | Age: 43
End: 2023-01-16
Payer: COMMERCIAL

## 2023-01-16 ENCOUNTER — HOSPITAL ENCOUNTER (OUTPATIENT)
Dept: MAMMOGRAPHY | Facility: HOSPITAL | Age: 43
Discharge: HOME | End: 2023-01-16
Admitting: INTERNAL MEDICINE
Payer: COMMERCIAL

## 2023-01-16 ENCOUNTER — OFFICE VISIT (OUTPATIENT)
Dept: ENDOCRINOLOGY | Facility: CLINIC | Age: 43
End: 2023-01-16
Payer: COMMERCIAL

## 2023-01-16 VITALS
DIASTOLIC BLOOD PRESSURE: 72 MMHG | HEART RATE: 70 BPM | SYSTOLIC BLOOD PRESSURE: 120 MMHG | BODY MASS INDEX: 27.49 KG/M2 | WEIGHT: 165 LBS | OXYGEN SATURATION: 98 % | HEIGHT: 65 IN

## 2023-01-16 VITALS
DIASTOLIC BLOOD PRESSURE: 86 MMHG | OXYGEN SATURATION: 99 % | WEIGHT: 165 LBS | HEART RATE: 58 BPM | BODY MASS INDEX: 27.49 KG/M2 | HEIGHT: 65 IN | TEMPERATURE: 97 F | SYSTOLIC BLOOD PRESSURE: 128 MMHG

## 2023-01-16 DIAGNOSIS — Z12.31 VISIT FOR SCREENING MAMMOGRAM: ICD-10-CM

## 2023-01-16 DIAGNOSIS — R53.82 CHRONIC FATIGUE: ICD-10-CM

## 2023-01-16 DIAGNOSIS — E55.9 VITAMIN D DEFICIENCY: Chronic | ICD-10-CM

## 2023-01-16 DIAGNOSIS — R23.2 HOT FLASHES: ICD-10-CM

## 2023-01-16 DIAGNOSIS — N39.0 RECURRENT UTI: ICD-10-CM

## 2023-01-16 DIAGNOSIS — E03.9 ACQUIRED HYPOTHYROIDISM: Primary | Chronic | ICD-10-CM

## 2023-01-16 DIAGNOSIS — M54.40 BILATERAL LOW BACK PAIN WITH SCIATICA, SCIATICA LATERALITY UNSPECIFIED, UNSPECIFIED CHRONICITY: ICD-10-CM

## 2023-01-16 DIAGNOSIS — K21.00 GASTROESOPHAGEAL REFLUX DISEASE WITH ESOPHAGITIS WITHOUT HEMORRHAGE: Primary | ICD-10-CM

## 2023-01-16 LAB
BASOPHILS # BLD AUTO: 0.03 10*3/MM3 (ref 0–0.2)
BASOPHILS NFR BLD AUTO: 0.4 % (ref 0–1.5)
DEPRECATED RDW RBC AUTO: 40 FL (ref 37–54)
EOSINOPHIL # BLD AUTO: 0.14 10*3/MM3 (ref 0–0.4)
EOSINOPHIL NFR BLD AUTO: 2 % (ref 0.3–6.2)
ERYTHROCYTE [DISTWIDTH] IN BLOOD BY AUTOMATED COUNT: 11.6 % (ref 12.3–15.4)
HCT VFR BLD AUTO: 40.1 % (ref 34–46.6)
HGB BLD-MCNC: 13.4 G/DL (ref 12–15.9)
IMM GRANULOCYTES # BLD AUTO: 0.02 10*3/MM3 (ref 0–0.05)
IMM GRANULOCYTES NFR BLD AUTO: 0.3 % (ref 0–0.5)
LYMPHOCYTES # BLD AUTO: 2.48 10*3/MM3 (ref 0.7–3.1)
LYMPHOCYTES NFR BLD AUTO: 35.5 % (ref 19.6–45.3)
MCH RBC QN AUTO: 32.3 PG (ref 26.6–33)
MCHC RBC AUTO-ENTMCNC: 33.4 G/DL (ref 31.5–35.7)
MCV RBC AUTO: 96.6 FL (ref 79–97)
MONOCYTES # BLD AUTO: 0.4 10*3/MM3 (ref 0.1–0.9)
MONOCYTES NFR BLD AUTO: 5.7 % (ref 5–12)
NEUTROPHILS NFR BLD AUTO: 3.91 10*3/MM3 (ref 1.7–7)
NEUTROPHILS NFR BLD AUTO: 56.1 % (ref 42.7–76)
NRBC BLD AUTO-RTO: 0 /100 WBC (ref 0–0.2)
PLATELET # BLD AUTO: 287 10*3/MM3 (ref 140–450)
PMV BLD AUTO: 10.6 FL (ref 6–12)
RBC # BLD AUTO: 4.15 10*6/MM3 (ref 3.77–5.28)
WBC NRBC COR # BLD: 6.98 10*3/MM3 (ref 3.4–10.8)

## 2023-01-16 PROCEDURE — 77067 SCR MAMMO BI INCL CAD: CPT | Performed by: RADIOLOGY

## 2023-01-16 PROCEDURE — 77063 BREAST TOMOSYNTHESIS BI: CPT | Performed by: RADIOLOGY

## 2023-01-16 PROCEDURE — 99214 OFFICE O/P EST MOD 30 MIN: CPT | Performed by: PHYSICIAN ASSISTANT

## 2023-01-16 PROCEDURE — 83540 ASSAY OF IRON: CPT | Performed by: PHYSICIAN ASSISTANT

## 2023-01-16 PROCEDURE — 84439 ASSAY OF FREE THYROXINE: CPT | Performed by: PHYSICIAN ASSISTANT

## 2023-01-16 PROCEDURE — 82306 VITAMIN D 25 HYDROXY: CPT | Performed by: PHYSICIAN ASSISTANT

## 2023-01-16 PROCEDURE — 77063 BREAST TOMOSYNTHESIS BI: CPT

## 2023-01-16 PROCEDURE — 82728 ASSAY OF FERRITIN: CPT | Performed by: PHYSICIAN ASSISTANT

## 2023-01-16 PROCEDURE — 99214 OFFICE O/P EST MOD 30 MIN: CPT | Performed by: INTERNAL MEDICINE

## 2023-01-16 PROCEDURE — 80050 GENERAL HEALTH PANEL: CPT | Performed by: PHYSICIAN ASSISTANT

## 2023-01-16 PROCEDURE — 77067 SCR MAMMO BI INCL CAD: CPT

## 2023-01-16 PROCEDURE — 84466 ASSAY OF TRANSFERRIN: CPT | Performed by: PHYSICIAN ASSISTANT

## 2023-01-16 PROCEDURE — 36415 COLL VENOUS BLD VENIPUNCTURE: CPT | Performed by: PHYSICIAN ASSISTANT

## 2023-01-16 PROCEDURE — 82607 VITAMIN B-12: CPT | Performed by: PHYSICIAN ASSISTANT

## 2023-01-16 RX ORDER — CYCLOBENZAPRINE HCL 10 MG
10 TABLET ORAL DAILY
Qty: 30 TABLET | Refills: 4 | Status: SHIPPED | OUTPATIENT
Start: 2023-01-16

## 2023-01-16 RX ORDER — NITROFURANTOIN MACROCRYSTALS 100 MG/1
CAPSULE ORAL
Qty: 20 CAPSULE | Refills: 1 | Status: SHIPPED | OUTPATIENT
Start: 2023-01-16

## 2023-01-16 RX ORDER — HYDROCODONE BITARTRATE AND ACETAMINOPHEN 10; 325 MG/1; MG/1
1 TABLET ORAL EVERY 8 HOURS PRN
Qty: 90 TABLET | Refills: 0 | Status: SHIPPED | OUTPATIENT
Start: 2023-01-16 | End: 2023-02-15 | Stop reason: SDUPTHER

## 2023-01-16 RX ORDER — PANTOPRAZOLE SODIUM 20 MG/1
20 TABLET, DELAYED RELEASE ORAL DAILY
Qty: 30 TABLET | Refills: 5 | Status: SHIPPED | OUTPATIENT
Start: 2023-01-16 | End: 2023-03-13 | Stop reason: SDUPTHER

## 2023-01-16 NOTE — PROGRESS NOTES
"Back Pain and Heartburn    Subjective   Subha Patino is a 42 y.o. female is here today for follow-up.    History of Present Illness     The patient states she requested Protonix after taking 2 of her sister's Protonix, which made a significant difference in her symptoms. Initially, she was taking Pepcid and was switched to Prilosec, which she states is more effective than Pepcid. She reports she has not had another \"attack\" in some time.     Additionally, she wonders if Synthroid can cause nausea if not taken with enough water as she recalls waking up nauseous daily for 1 to 2 months.     The patient has been experiencing intermittent hot flashes and wonders if blood work has been completed to confirm menopause.    She also complains of restless legs, which is more noticeable after sitting for long periods of time and at nighttime. Confirms standing all day at work. Ms. Patino reports adding a pinch of salt to her water or drinking Propel during extended fasting in an effort to avoid depleted electrolytes and another cardiac episode due to patent foramen ovale. Per her report, she fasts for approximately 18 hours per day.     Her last hemoglobin A1c was lower than normal range and she confirms occasional dizziness upon standing.         Current Outpatient Medications:   •  aspirin 81 MG EC tablet, Take 81 mg by mouth Daily., Disp: , Rfl:   •  busPIRone (BUSPAR) 15 MG tablet, TAKE 1 TABLET BY MOUTH TWICE A DAY, Disp: 60 tablet, Rfl: 1  •  Cyanocobalamin (Vitamin B-12) 5000 MCG sublingual tablet, Take 5,000 mcg by mouth Daily., Disp: 90 tablet, Rfl: 3  •  cyclobenzaprine (FLEXERIL) 10 MG tablet, Take 1 tablet by mouth Daily., Disp: 30 tablet, Rfl: 4  •  HYDROcodone-acetaminophen (NORCO)  MG per tablet, Take 1 tablet by mouth Every 8 (Eight) Hours As Needed for Moderate Pain., Disp: 90 tablet, Rfl: 0  •  Naloxone HCl 0.4 MG/0.4ML solution auto-injector, Inject 0.4 mL as directed 1 (One) Time As Needed " "(confusion) for up to 1 dose., Disp: 0.4 mL, Rfl: 1  •  nitrofurantoin (Macrodantin) 100 MG capsule, 1 PO prn intercourse, Disp: 20 capsule, Rfl: 1  •  omeprazole (priLOSEC) 20 MG capsule, Take 1 capsule by mouth Daily. Replaces famotidine, Disp: 90 capsule, Rfl: 1  •  propranolol (INDERAL) 10 MG tablet, TAKE 1-2 TABLETS BY MOUTH 2 TIMES A DAY AS NEEDED., Disp: 60 tablet, Rfl: 1  •  spironolactone (ALDACTONE) 50 MG tablet, Take 2 tablets by mouth Daily As Needed (acne)., Disp: 180 tablet, Rfl: 3  •  Synthroid 112 MCG tablet, TAKE 1 TABLET BY MOUTH DAILY ON EMPTY STOMACH, Disp: 90 tablet, Rfl: 1  •  loratadine (CLARITIN) 10 MG tablet, TAKE 1 TABLET BY MOUTH EVERY DAY, Disp: 30 tablet, Rfl: 1  •  pantoprazole (PROTONIX) 20 MG EC tablet, Take 1 tablet by mouth Daily., Disp: 30 tablet, Rfl: 5      The following portions of the patient's history were reviewed and updated as appropriate: allergies, current medications, past family history, past medical history, past social history, past surgical history and problem list.    Review of Systems   Constitutional: Negative.  Negative for chills and fever.   HENT: Negative for ear discharge, ear pain, sinus pressure and sore throat.    Respiratory: Negative for cough, chest tightness and shortness of breath.    Cardiovascular: Positive for palpitations. Negative for chest pain and leg swelling.   Gastrointestinal: Negative for diarrhea, nausea and vomiting.   Musculoskeletal: Negative for arthralgias, back pain and myalgias.   Neurological: Negative for dizziness, syncope and headaches.   Psychiatric/Behavioral: Negative for confusion and sleep disturbance.       Objective   /86   Pulse 58   Temp 97 °F (36.1 °C)   Ht 165.1 cm (65\")   Wt 74.8 kg (165 lb)   SpO2 99% Comment: ra  BMI 27.46 kg/m²   Physical Exam  Vitals and nursing note reviewed.   Constitutional:       Appearance: She is well-developed.   HENT:      Head: Normocephalic and atraumatic.      Right Ear: " External ear normal.      Left Ear: External ear normal.      Mouth/Throat:      Pharynx: No oropharyngeal exudate.   Eyes:      Conjunctiva/sclera: Conjunctivae normal.      Pupils: Pupils are equal, round, and reactive to light.   Neck:      Thyroid: No thyromegaly.   Cardiovascular:      Rate and Rhythm: Normal rate and regular rhythm.      Pulses: Normal pulses.      Heart sounds: Normal heart sounds. No murmur heard.    No friction rub. No gallop.   Pulmonary:      Effort: Pulmonary effort is normal.      Breath sounds: Normal breath sounds.   Abdominal:      General: Bowel sounds are normal. There is no distension.      Palpations: Abdomen is soft.      Tenderness: There is no abdominal tenderness.   Musculoskeletal:         General: Tenderness present.      Cervical back: Neck supple.   Skin:     General: Skin is warm and dry.   Neurological:      Mental Status: She is alert and oriented to person, place, and time.      Cranial Nerves: No cranial nerve deficit.   Psychiatric:         Judgment: Judgment normal.           Results for orders placed or performed in visit on 01/16/23   TSH    Specimen: Blood   Result Value Ref Range    TSH 1.440 0.270 - 4.200 uIU/mL   T4, Free    Specimen: Blood   Result Value Ref Range    Free T4 1.64 0.93 - 1.70 ng/dL   Vitamin D,25-Hydroxy    Specimen: Blood   Result Value Ref Range    25 Hydroxy, Vitamin D 48.8 30.0 - 100.0 ng/ml   Comprehensive Metabolic Panel    Specimen: Blood   Result Value Ref Range    Glucose 92 65 - 99 mg/dL    BUN 10 6 - 20 mg/dL    Creatinine 0.78 0.57 - 1.00 mg/dL    Sodium 139 136 - 145 mmol/L    Potassium 4.6 3.5 - 5.2 mmol/L    Chloride 105 98 - 107 mmol/L    CO2 26.0 22.0 - 29.0 mmol/L    Calcium 9.3 8.6 - 10.5 mg/dL    Total Protein 6.7 6.0 - 8.5 g/dL    Albumin 4.8 3.5 - 5.2 g/dL    ALT (SGPT) 15 1 - 33 U/L    AST (SGOT) 20 1 - 32 U/L    Alkaline Phosphatase 45 39 - 117 U/L    Total Bilirubin 0.4 0.0 - 1.2 mg/dL    Globulin 1.9 gm/dL    A/G  Ratio 2.5 g/dL    BUN/Creatinine Ratio 12.8 7.0 - 25.0    Anion Gap 8.0 5.0 - 15.0 mmol/L    eGFR 96.8 >60.0 mL/min/1.73   Ferritin    Specimen: Blood   Result Value Ref Range    Ferritin 197.00 (H) 13.00 - 150.00 ng/mL   Iron Profile    Specimen: Blood   Result Value Ref Range    Iron 128 37 - 145 mcg/dL    Iron Saturation 37 20 - 50 %    Transferrin 232 200 - 360 mg/dL    TIBC 346 298 - 536 mcg/dL   Vitamin B12    Specimen: Blood   Result Value Ref Range    Vitamin B-12 >2,000 (H) 211 - 946 pg/mL   CBC Auto Differential    Specimen: Blood   Result Value Ref Range    WBC 6.98 3.40 - 10.80 10*3/mm3    RBC 4.15 3.77 - 5.28 10*6/mm3    Hemoglobin 13.4 12.0 - 15.9 g/dL    Hematocrit 40.1 34.0 - 46.6 %    MCV 96.6 79.0 - 97.0 fL    MCH 32.3 26.6 - 33.0 pg    MCHC 33.4 31.5 - 35.7 g/dL    RDW 11.6 (L) 12.3 - 15.4 %    RDW-SD 40.0 37.0 - 54.0 fl    MPV 10.6 6.0 - 12.0 fL    Platelets 287 140 - 450 10*3/mm3    Neutrophil % 56.1 42.7 - 76.0 %    Lymphocyte % 35.5 19.6 - 45.3 %    Monocyte % 5.7 5.0 - 12.0 %    Eosinophil % 2.0 0.3 - 6.2 %    Basophil % 0.4 0.0 - 1.5 %    Immature Grans % 0.3 0.0 - 0.5 %    Neutrophils, Absolute 3.91 1.70 - 7.00 10*3/mm3    Lymphocytes, Absolute 2.48 0.70 - 3.10 10*3/mm3    Monocytes, Absolute 0.40 0.10 - 0.90 10*3/mm3    Eosinophils, Absolute 0.14 0.00 - 0.40 10*3/mm3    Basophils, Absolute 0.03 0.00 - 0.20 10*3/mm3    Immature Grans, Absolute 0.02 0.00 - 0.05 10*3/mm3    nRBC 0.0 0.0 - 0.2 /100 WBC             Assessment & Plan   Diagnoses and all orders for this visit:    Gastroesophageal reflux disease with esophagitis without hemorrhage  -     pantoprazole (PROTONIX) 20 MG EC tablet; Take 1 tablet by mouth Daily.    Bilateral low back pain with sciatica, sciatica laterality unspecified, unspecified chronicity  Comments:  well controlled on current regimen.  Orders:  -     cyclobenzaprine (FLEXERIL) 10 MG tablet; Take 1 tablet by mouth Daily.  -     HYDROcodone-acetaminophen (NORCO)   MG per tablet; Take 1 tablet by mouth Every 8 (Eight) Hours As Needed for Moderate Pain.    Recurrent UTI  -     nitrofurantoin (Macrodantin) 100 MG capsule; 1 PO prn intercourse    Hot flashes  -     Follicle Stimulating Hormone; Future    start protonix.hold prilosec. Adv 40 mg dose not covered. But can try to PA if 20 mg dose not helping.                 Return in about 2 months (around 3/16/2023) for Next scheduled follow up.    Electronically signed by:    Catrina Todd MD   Transcribed from ambient dictation for Catrina Todd MD by Ashleigh Crawford.  01/16/23   15:35 EST    Patient or patient representative verbalized consent to the visit recording.

## 2023-01-16 NOTE — PROGRESS NOTES
Chief complaint  Hypothyroidism    Subjective   Subha Patino is a 42 y.o. female is here today for follow-up.    Hypothyroidism:  Current treatment includes Synthroid 100 mcg. Previously on levothyroxine. She takes the medication appropriately.   Previous meds: generic levothyroxine triggered palpitations, Tirosint d/c due to cost. Synthroid seem to work best for her.   U/s thyroid in 2013 - small multiple nodules with largest measuring 3 mm.     She has had more fatigue this winter.   Otherwise doing ok.  Hx vit d deficiency. Takes supplement sometimes.   Periods are still monthly but getting lighter and lighter. On spironolactone for acne - rx by pcp.     Medications    Current Outpatient Medications:   •  aspirin 81 MG EC tablet, Take 81 mg by mouth Daily., Disp: , Rfl:   •  busPIRone (BUSPAR) 15 MG tablet, TAKE 1 TABLET BY MOUTH TWICE A DAY, Disp: 60 tablet, Rfl: 1  •  Cyanocobalamin (Vitamin B-12) 5000 MCG sublingual tablet, Take 5,000 mcg by mouth Daily., Disp: 90 tablet, Rfl: 3  •  cyclobenzaprine (FLEXERIL) 10 MG tablet, Take 1 tablet by mouth Daily., Disp: 30 tablet, Rfl: 4  •  HYDROcodone-acetaminophen (NORCO)  MG per tablet, Take 1 tablet by mouth Every 8 (Eight) Hours As Needed for Moderate Pain., Disp: 90 tablet, Rfl: 0  •  loratadine (CLARITIN) 10 MG tablet, TAKE 1 TABLET BY MOUTH EVERY DAY, Disp: 30 tablet, Rfl: 1  •  Naloxone HCl 0.4 MG/0.4ML solution auto-injector, Inject 0.4 mL as directed 1 (One) Time As Needed (confusion) for up to 1 dose., Disp: 0.4 mL, Rfl: 1  •  nitrofurantoin (Macrodantin) 100 MG capsule, 1 PO prn intercourse, Disp: 20 capsule, Rfl: 1  •  omeprazole (priLOSEC) 20 MG capsule, Take 1 capsule by mouth Daily. Replaces famotidine, Disp: 90 capsule, Rfl: 1  •  propranolol (INDERAL) 10 MG tablet, TAKE 1-2 TABLETS BY MOUTH 2 TIMES A DAY AS NEEDED., Disp: 60 tablet, Rfl: 1  •  spironolactone (ALDACTONE) 50 MG tablet, Take 2 tablets by mouth Daily As Needed (acne)., Disp: 180  "tablet, Rfl: 3  •  Synthroid 112 MCG tablet, TAKE 1 TABLET BY MOUTH DAILY ON EMPTY STOMACH, Disp: 90 tablet, Rfl: 1      Review of systems  Review of Systems   Constitutional: Positive for fatigue.       Physical exam  Objective   Blood pressure 120/72, pulse 70, height 165.1 cm (65\"), weight 74.8 kg (165 lb), SpO2 98 %, not currently breastfeeding.   Physical Exam   Constitutional: She is oriented to person, place, and time. She appears well-developed.   HENT:   Head: Normocephalic and atraumatic.   Eyes: Conjunctivae are normal.   Neck: No thyromegaly present.   The neck is supple and no assimetry visualized   Cardiovascular: Normal rate, regular rhythm, normal heart sounds and normal pulses.   Pulmonary/Chest: Effort normal and breath sounds normal.   Musculoskeletal: No swelling.   Neurological: She is alert and oriented to person, place, and time.   Psychiatric: Thought content normal.   Vitals reviewed.        LABS AND IMAGING  Lab Results   Component Value Date    TSH 0.821 07/25/2022    T2WQINB 11.70 (H) 09/13/2018           Assessment  Assessment & Plan   Problems Addressed this Visit        Other    Hypothyroidism - Primary    Relevant Orders    TSH    T4, Free    Vitamin D deficiency    Relevant Orders    Vitamin D,25-Hydroxy   Other Visit Diagnoses     Chronic fatigue  (Chronic)       Relevant Orders    Comprehensive Metabolic Panel    CBC & Differential    Ferritin    Iron Profile    Vitamin B12      Diagnoses       Codes Comments    Acquired hypothyroidism    -  Primary ICD-10-CM: E03.9  ICD-9-CM: 244.9     Vitamin D deficiency     ICD-10-CM: E55.9  ICD-9-CM: 268.9     Chronic fatigue     ICD-10-CM: R53.82  ICD-9-CM: 780.79           Plan  Synthroid 112 mcg daily , repeat labs  Check vitamin D, b12, cbc, cmp, iron with fatigue    Follow-up in 12 months  "

## 2023-01-17 LAB
25(OH)D3 SERPL-MCNC: 48.8 NG/ML (ref 30–100)
ALBUMIN SERPL-MCNC: 4.8 G/DL (ref 3.5–5.2)
ALBUMIN/GLOB SERPL: 2.5 G/DL
ALP SERPL-CCNC: 45 U/L (ref 39–117)
ALT SERPL W P-5'-P-CCNC: 15 U/L (ref 1–33)
ANION GAP SERPL CALCULATED.3IONS-SCNC: 8 MMOL/L (ref 5–15)
AST SERPL-CCNC: 20 U/L (ref 1–32)
BILIRUB SERPL-MCNC: 0.4 MG/DL (ref 0–1.2)
BUN SERPL-MCNC: 10 MG/DL (ref 6–20)
BUN/CREAT SERPL: 12.8 (ref 7–25)
CALCIUM SPEC-SCNC: 9.3 MG/DL (ref 8.6–10.5)
CHLORIDE SERPL-SCNC: 105 MMOL/L (ref 98–107)
CO2 SERPL-SCNC: 26 MMOL/L (ref 22–29)
CREAT SERPL-MCNC: 0.78 MG/DL (ref 0.57–1)
EGFRCR SERPLBLD CKD-EPI 2021: 96.8 ML/MIN/1.73
FERRITIN SERPL-MCNC: 197 NG/ML (ref 13–150)
GLOBULIN UR ELPH-MCNC: 1.9 GM/DL
GLUCOSE SERPL-MCNC: 92 MG/DL (ref 65–99)
IRON 24H UR-MRATE: 128 MCG/DL (ref 37–145)
IRON SATN MFR SERPL: 37 % (ref 20–50)
POTASSIUM SERPL-SCNC: 4.6 MMOL/L (ref 3.5–5.2)
PROT SERPL-MCNC: 6.7 G/DL (ref 6–8.5)
SODIUM SERPL-SCNC: 139 MMOL/L (ref 136–145)
T4 FREE SERPL-MCNC: 1.64 NG/DL (ref 0.93–1.7)
TIBC SERPL-MCNC: 346 MCG/DL (ref 298–536)
TRANSFERRIN SERPL-MCNC: 232 MG/DL (ref 200–360)
TSH SERPL DL<=0.05 MIU/L-ACNC: 1.44 UIU/ML (ref 0.27–4.2)
VIT B12 BLD-MCNC: >2000 PG/ML (ref 211–946)

## 2023-01-17 NOTE — PROGRESS NOTES
I have reviewed the notes, assessments, and/or procedures performed by Jayda Enamoraod PA-C, I concur with her/his documentation of Subha Patino

## 2023-02-08 DIAGNOSIS — F41.1 GENERALIZED ANXIETY DISORDER: ICD-10-CM

## 2023-02-08 RX ORDER — BUSPIRONE HYDROCHLORIDE 15 MG/1
TABLET ORAL
Qty: 60 TABLET | Refills: 1 | Status: SHIPPED | OUTPATIENT
Start: 2023-02-08

## 2023-02-12 DIAGNOSIS — F41.1 GENERALIZED ANXIETY DISORDER: ICD-10-CM

## 2023-02-12 DIAGNOSIS — F41.0 PANIC ATTACKS: ICD-10-CM

## 2023-02-13 RX ORDER — PROPRANOLOL HYDROCHLORIDE 10 MG/1
TABLET ORAL
Qty: 60 TABLET | Refills: 1 | Status: SHIPPED | OUTPATIENT
Start: 2023-02-13 | End: 2023-03-19

## 2023-02-15 DIAGNOSIS — M54.40 BILATERAL LOW BACK PAIN WITH SCIATICA, SCIATICA LATERALITY UNSPECIFIED, UNSPECIFIED CHRONICITY: ICD-10-CM

## 2023-02-15 NOTE — TELEPHONE ENCOUNTER
Last appointment: 1/16/2023  Next appointment: 3/13/2023     UDS: 9/19/2022  CSC: 9/21/2022    Last Refill: 1/16/2023

## 2023-02-15 NOTE — TELEPHONE ENCOUNTER
Caller: Subha Patino    Relationship: Self    Best call back number: [phone]    Requested Prescriptions:   Requested Prescriptions     Pending Prescriptions Disp Refills   • HYDROcodone-acetaminophen (NORCO)  MG per tablet 90 tablet 0     Sig: Take 1 tablet by mouth Every 8 (Eight) Hours As Needed for Moderate Pain.        Pharmacy where request should be sent: [facility]/PHARMACY #[id number] - [place], [place] - [facility] [address]EXT TO [facility] - [phone]  - [phone] FX     Additional details provided by patient: PATIENT HAS ONE DAY LEFT    Does the patient have less than a 3 day supply:  [x] Yes  [] No    Would you like a call back once the refill request has been completed: [x] Yes [] No    If the office needs to give you a call back, can they leave a voicemail: [x] Yes [] No    Gissel Dodson Rep   02/15/23 14:24 EST

## 2023-02-16 RX ORDER — HYDROCODONE BITARTRATE AND ACETAMINOPHEN 10; 325 MG/1; MG/1
1 TABLET ORAL EVERY 8 HOURS PRN
Qty: 90 TABLET | Refills: 0 | Status: SHIPPED | OUTPATIENT
Start: 2023-02-16 | End: 2023-03-13 | Stop reason: SDUPTHER

## 2023-02-23 DIAGNOSIS — F41.1 GENERALIZED ANXIETY DISORDER: ICD-10-CM

## 2023-02-23 DIAGNOSIS — F41.0 PANIC ATTACKS: ICD-10-CM

## 2023-02-23 RX ORDER — PROPRANOLOL HYDROCHLORIDE 10 MG/1
TABLET ORAL
Qty: 60 TABLET | Refills: 1 | OUTPATIENT
Start: 2023-02-23

## 2023-03-13 ENCOUNTER — OFFICE VISIT (OUTPATIENT)
Dept: INTERNAL MEDICINE | Facility: CLINIC | Age: 43
End: 2023-03-13
Payer: COMMERCIAL

## 2023-03-13 ENCOUNTER — LAB (OUTPATIENT)
Dept: LAB | Facility: HOSPITAL | Age: 43
End: 2023-03-13
Payer: COMMERCIAL

## 2023-03-13 VITALS
BODY MASS INDEX: 27.82 KG/M2 | WEIGHT: 167 LBS | HEART RATE: 67 BPM | DIASTOLIC BLOOD PRESSURE: 78 MMHG | SYSTOLIC BLOOD PRESSURE: 122 MMHG | HEIGHT: 65 IN | OXYGEN SATURATION: 99 % | TEMPERATURE: 97.3 F

## 2023-03-13 DIAGNOSIS — N95.1 PERIMENOPAUSE: ICD-10-CM

## 2023-03-13 DIAGNOSIS — E53.8 VITAMIN B12 DEFICIENCY: ICD-10-CM

## 2023-03-13 DIAGNOSIS — N95.1 PERIMENOPAUSE: Primary | ICD-10-CM

## 2023-03-13 DIAGNOSIS — K21.00 GASTROESOPHAGEAL REFLUX DISEASE WITH ESOPHAGITIS WITHOUT HEMORRHAGE: ICD-10-CM

## 2023-03-13 DIAGNOSIS — M54.40 BILATERAL LOW BACK PAIN WITH SCIATICA, SCIATICA LATERALITY UNSPECIFIED, UNSPECIFIED CHRONICITY: ICD-10-CM

## 2023-03-13 DIAGNOSIS — R23.2 HOT FLASHES: ICD-10-CM

## 2023-03-13 DIAGNOSIS — L98.9 FINGER LESION: ICD-10-CM

## 2023-03-13 PROCEDURE — 1160F RVW MEDS BY RX/DR IN RCRD: CPT | Performed by: INTERNAL MEDICINE

## 2023-03-13 PROCEDURE — 82670 ASSAY OF TOTAL ESTRADIOL: CPT

## 2023-03-13 PROCEDURE — 83001 ASSAY OF GONADOTROPIN (FSH): CPT

## 2023-03-13 PROCEDURE — 99214 OFFICE O/P EST MOD 30 MIN: CPT | Performed by: INTERNAL MEDICINE

## 2023-03-13 PROCEDURE — 36415 COLL VENOUS BLD VENIPUNCTURE: CPT

## 2023-03-13 PROCEDURE — 1159F MED LIST DOCD IN RCRD: CPT | Performed by: INTERNAL MEDICINE

## 2023-03-13 RX ORDER — HYDROCODONE BITARTRATE AND ACETAMINOPHEN 10; 325 MG/1; MG/1
1 TABLET ORAL EVERY 8 HOURS PRN
Qty: 90 TABLET | Refills: 0 | Status: SHIPPED | OUTPATIENT
Start: 2023-03-13

## 2023-03-13 RX ORDER — PANTOPRAZOLE SODIUM 20 MG/1
20 TABLET, DELAYED RELEASE ORAL DAILY
Qty: 90 TABLET | Refills: 1 | Status: SHIPPED | OUTPATIENT
Start: 2023-03-13

## 2023-03-13 NOTE — PROGRESS NOTES
Heartburn and Back Pain    Subjective   Subha Patino is a 42 y.o. female is here today for follow-up.    History of Present Illness   The patient presents today for a follow-up.    She denies any concerns today.    She is inquiring about menopause testing. Her FSH is still pending.    She reports a wart that appeared yesterday. She popped it this morning. She denies it increased in size.    She needs a refill on her vitamin B12. She has 4 to 5 days left with her sublingual vitamin B12. Her vitamin B12 was elevated. She was advised to continue taking it every other day.    Her blood glucose was 126 mg/dL this morning.    She has a mole that is itchy. It is not changing in size or look. She has them all over her body.    Her iron is good. Her ferritin is borderline. Her white blood cell count was normal.    She was taking Prilosec, however, it did not work. She was then prescribed pantoprazole for 1 month. She is out of the Protonix. She notes it is working better than Protonix. She is taking 2 tablets daily.    She is inquiring about Calm gummies.    Current Outpatient Medications:   •  aspirin 81 MG EC tablet, Take 1 tablet by mouth Daily., Disp: , Rfl:   •  busPIRone (BUSPAR) 15 MG tablet, TAKE 1 TABLET BY MOUTH TWICE A DAY, Disp: 60 tablet, Rfl: 1  •  Cyanocobalamin (Vitamin B-12) 5000 MCG sublingual tablet, Take 5,000 mcg by mouth Daily., Disp: 90 tablet, Rfl: 3  •  cyclobenzaprine (FLEXERIL) 10 MG tablet, Take 1 tablet by mouth Daily., Disp: 30 tablet, Rfl: 4  •  HYDROcodone-acetaminophen (NORCO)  MG per tablet, Take 1 tablet by mouth Every 8 (Eight) Hours As Needed for Moderate Pain., Disp: 90 tablet, Rfl: 0  •  Naloxone HCl 0.4 MG/0.4ML solution auto-injector, Inject 0.4 mL as directed 1 (One) Time As Needed (confusion) for up to 1 dose., Disp: 0.4 mL, Rfl: 1  •  nitrofurantoin (Macrodantin) 100 MG capsule, 1 PO prn intercourse, Disp: 20 capsule, Rfl: 1  •  pantoprazole (PROTONIX) 20 MG EC tablet,  "Take 1 tablet by mouth Daily., Disp: 90 tablet, Rfl: 1  •  propranolol (INDERAL) 10 MG tablet, TAKE 1-2 TABLETS BY MOUTH 2 TIMES A DAY AS NEEDED., Disp: 60 tablet, Rfl: 1  •  spironolactone (ALDACTONE) 50 MG tablet, Take 2 tablets by mouth Daily As Needed (acne)., Disp: 180 tablet, Rfl: 3  •  Synthroid 112 MCG tablet, TAKE 1 TABLET BY MOUTH DAILY ON EMPTY STOMACH, Disp: 90 tablet, Rfl: 1      The following portions of the patient's history were reviewed and updated as appropriate: allergies, current medications, past family history, past medical history, past social history, past surgical history and problem list.    Review of Systems   Constitutional: Negative.  Negative for chills and fever.   HENT: Negative for ear discharge, ear pain, sinus pressure and sore throat.    Respiratory: Negative for cough, chest tightness and shortness of breath.    Cardiovascular: Negative for chest pain, palpitations and leg swelling.   Gastrointestinal: Negative for diarrhea, nausea and vomiting.   Musculoskeletal: Positive for back pain. Negative for arthralgias and myalgias.   Skin: Positive for rash.   Neurological: Negative for dizziness, syncope and headaches.   Psychiatric/Behavioral: Negative for confusion and sleep disturbance.       Objective   /78   Pulse 67   Temp 97.3 °F (36.3 °C)   Ht 165.1 cm (65\")   Wt 75.8 kg (167 lb)   SpO2 99%   BMI 27.79 kg/m²   Physical Exam  Vitals and nursing note reviewed.   Constitutional:       Appearance: She is well-developed.   HENT:      Head: Normocephalic and atraumatic.   Eyes:      Conjunctiva/sclera: Conjunctivae normal.      Pupils: Pupils are equal, round, and reactive to light.   Neck:      Thyroid: No thyromegaly.   Cardiovascular:      Rate and Rhythm: Normal rate and regular rhythm.      Pulses: Normal pulses.      Heart sounds: Normal heart sounds. No murmur heard.    No friction rub. No gallop.   Pulmonary:      Effort: Pulmonary effort is normal.      Breath " sounds: Normal breath sounds.   Abdominal:      General: Bowel sounds are normal. There is no distension.      Palpations: Abdomen is soft.      Tenderness: There is no abdominal tenderness.   Musculoskeletal:         General: Tenderness present.      Cervical back: Neck supple.   Skin:     General: Skin is warm and dry.      Findings: Lesion (rt middle finger near dip jt) present.   Neurological:      Mental Status: She is alert and oriented to person, place, and time.      Cranial Nerves: No cranial nerve deficit.   Psychiatric:         Judgment: Judgment normal.           Results for orders placed or performed in visit on 01/16/23   TSH    Specimen: Blood   Result Value Ref Range    TSH 1.440 0.270 - 4.200 uIU/mL   T4, Free    Specimen: Blood   Result Value Ref Range    Free T4 1.64 0.93 - 1.70 ng/dL   Vitamin D,25-Hydroxy    Specimen: Blood   Result Value Ref Range    25 Hydroxy, Vitamin D 48.8 30.0 - 100.0 ng/ml   Comprehensive Metabolic Panel    Specimen: Blood   Result Value Ref Range    Glucose 92 65 - 99 mg/dL    BUN 10 6 - 20 mg/dL    Creatinine 0.78 0.57 - 1.00 mg/dL    Sodium 139 136 - 145 mmol/L    Potassium 4.6 3.5 - 5.2 mmol/L    Chloride 105 98 - 107 mmol/L    CO2 26.0 22.0 - 29.0 mmol/L    Calcium 9.3 8.6 - 10.5 mg/dL    Total Protein 6.7 6.0 - 8.5 g/dL    Albumin 4.8 3.5 - 5.2 g/dL    ALT (SGPT) 15 1 - 33 U/L    AST (SGOT) 20 1 - 32 U/L    Alkaline Phosphatase 45 39 - 117 U/L    Total Bilirubin 0.4 0.0 - 1.2 mg/dL    Globulin 1.9 gm/dL    A/G Ratio 2.5 g/dL    BUN/Creatinine Ratio 12.8 7.0 - 25.0    Anion Gap 8.0 5.0 - 15.0 mmol/L    eGFR 96.8 >60.0 mL/min/1.73   Ferritin    Specimen: Blood   Result Value Ref Range    Ferritin 197.00 (H) 13.00 - 150.00 ng/mL   Iron Profile    Specimen: Blood   Result Value Ref Range    Iron 128 37 - 145 mcg/dL    Iron Saturation 37 20 - 50 %    Transferrin 232 200 - 360 mg/dL    TIBC 346 298 - 536 mcg/dL   Vitamin B12    Specimen: Blood   Result Value Ref Range     Vitamin B-12 >2,000 (H) 211 - 946 pg/mL   CBC Auto Differential    Specimen: Blood   Result Value Ref Range    WBC 6.98 3.40 - 10.80 10*3/mm3    RBC 4.15 3.77 - 5.28 10*6/mm3    Hemoglobin 13.4 12.0 - 15.9 g/dL    Hematocrit 40.1 34.0 - 46.6 %    MCV 96.6 79.0 - 97.0 fL    MCH 32.3 26.6 - 33.0 pg    MCHC 33.4 31.5 - 35.7 g/dL    RDW 11.6 (L) 12.3 - 15.4 %    RDW-SD 40.0 37.0 - 54.0 fl    MPV 10.6 6.0 - 12.0 fL    Platelets 287 140 - 450 10*3/mm3    Neutrophil % 56.1 42.7 - 76.0 %    Lymphocyte % 35.5 19.6 - 45.3 %    Monocyte % 5.7 5.0 - 12.0 %    Eosinophil % 2.0 0.3 - 6.2 %    Basophil % 0.4 0.0 - 1.5 %    Immature Grans % 0.3 0.0 - 0.5 %    Neutrophils, Absolute 3.91 1.70 - 7.00 10*3/mm3    Lymphocytes, Absolute 2.48 0.70 - 3.10 10*3/mm3    Monocytes, Absolute 0.40 0.10 - 0.90 10*3/mm3    Eosinophils, Absolute 0.14 0.00 - 0.40 10*3/mm3    Basophils, Absolute 0.03 0.00 - 0.20 10*3/mm3    Immature Grans, Absolute 0.02 0.00 - 0.05 10*3/mm3    nRBC 0.0 0.0 - 0.2 /100 WBC             Assessment & Plan   Diagnoses and all orders for this visit:    Perimenopause  -     Estradiol; Future  -     Follicle Stimulating Hormone; Future    Vitamin B12 deficiency  -     Cyanocobalamin (Vitamin B-12) 5000 MCG sublingual tablet; Take 5,000 mcg by mouth Daily.    Bilateral low back pain with sciatica, sciatica laterality unspecified, unspecified chronicity  Comments:  well controlled on current regimen.  Orders:  -     HYDROcodone-acetaminophen (NORCO)  MG per tablet; Take 1 tablet by mouth Every 8 (Eight) Hours As Needed for Moderate Pain.    Gastroesophageal reflux disease with esophagitis without hemorrhage  -     pantoprazole (PROTONIX) 20 MG EC tablet; Take 1 tablet by mouth Daily.    Finger lesion  Comments:  topical triple antibiotic    1. Menopause  - FSH and estrogen level ordered. Will advise of results upon review.    2. Vitamin B12 deficiency  - Vitamin B12 supplement ordered. Will advise of results upon  review.  - Continue vitamin B12 supplement as prescribed.    3. GERD  - Continue Protonix as prescribed.    4. Hypothyroidism  - Continue Synthroid as prescribed.    5. Bilateral low back pain with sciatica  - Refill hydrocodone, take as prescribed.          The patient has read and signed the [facility] Controlled Substance Contract.  I will continue to see patient for regular follow up appointments.  They are well controlled on their medication.  MARJORIE has been reviewed by me and is updated every 3 months. The patient is aware of the potential for addiction and dependence.      Return in about 2 months (around 5/13/2023) for Next scheduled follow up.    Electronically signed by:    Catrina Todd MD   Transcribed from ambient dictation for Catrina Todd MD by Ashley Childress.  03/13/23   14:37 EDT    Patient or patient representative verbalized consent to the visit recording.  I have personally performed the services described in this document as transcribed by the above individual, and it is both accurate and complete.  Catrina Todd MD  3/14/2023  00:24 EDT

## 2023-03-14 LAB
ESTRADIOL SERPL HS-MCNC: 79 PG/ML
FSH SERPL-ACNC: 7.73 MIU/ML

## 2023-03-18 DIAGNOSIS — F41.0 PANIC ATTACKS: ICD-10-CM

## 2023-03-18 DIAGNOSIS — F41.1 GENERALIZED ANXIETY DISORDER: ICD-10-CM

## 2023-03-19 RX ORDER — PROPRANOLOL HYDROCHLORIDE 10 MG/1
TABLET ORAL
Qty: 60 TABLET | Refills: 1 | Status: SHIPPED | OUTPATIENT
Start: 2023-03-19

## 2023-04-17 ENCOUNTER — TELEPHONE (OUTPATIENT)
Dept: INTERNAL MEDICINE | Facility: CLINIC | Age: 43
End: 2023-04-17
Payer: COMMERCIAL

## 2023-04-17 DIAGNOSIS — M54.40 BILATERAL LOW BACK PAIN WITH SCIATICA, SCIATICA LATERALITY UNSPECIFIED, UNSPECIFIED CHRONICITY: ICD-10-CM

## 2023-04-17 RX ORDER — HYDROCODONE BITARTRATE AND ACETAMINOPHEN 10; 325 MG/1; MG/1
1 TABLET ORAL EVERY 8 HOURS PRN
Qty: 90 TABLET | Refills: 0 | Status: SHIPPED | OUTPATIENT
Start: 2023-04-17

## 2023-04-17 NOTE — TELEPHONE ENCOUNTER
Last appointment: 3/13/2023  Next appointment: 5/15/2023     UDS: 9/19/2022  CSC: 9/21/2022    Last Refill: 3/13/2023

## 2023-04-17 NOTE — TELEPHONE ENCOUNTER
Caller: Subha Patino    Relationship: Self    Best call back number: [phone]    Requested Prescriptions:   Requested Prescriptions     Pending Prescriptions Disp Refills   • HYDROcodone-acetaminophen (NORCO)  MG per tablet 90 tablet 0     Sig: Take 1 tablet by mouth Every 8 (Eight) Hours As Needed for Moderate Pain.        Pharmacy where request should be sent: [facility]/PHARMACY #[id number] - [place], [place] - [address] AT NEXT TO [facility] - [phone] [facility] [phone]      Last office visit with prescribing clinician: 3/13/2023   Last telemedicine visit with prescribing clinician: 5/15/2023   Next office visit with prescribing clinician: 5/15/2023     Additional details provided by patient: PATIENT HAS A COUPLE DAYS LEFT    Does the patient have less than a 3 day supply:  [x] Yes  [] No    Would you like a call back once the refill request has been completed: [x] Yes [] No    If the office needs to give you a call back, can they leave a voicemail: [x] Yes [] No    Gissel Starkey Rep   04/17/23 12:01 EDT

## 2023-05-18 ENCOUNTER — OFFICE VISIT (OUTPATIENT)
Dept: INTERNAL MEDICINE | Facility: CLINIC | Age: 43
End: 2023-05-18
Payer: COMMERCIAL

## 2023-05-18 VITALS
SYSTOLIC BLOOD PRESSURE: 108 MMHG | TEMPERATURE: 97.5 F | HEART RATE: 89 BPM | BODY MASS INDEX: 26.16 KG/M2 | HEIGHT: 65 IN | WEIGHT: 157 LBS | DIASTOLIC BLOOD PRESSURE: 64 MMHG | OXYGEN SATURATION: 95 %

## 2023-05-18 DIAGNOSIS — N63.11 MASS OF UPPER OUTER QUADRANT OF RIGHT BREAST: Primary | ICD-10-CM

## 2023-05-18 DIAGNOSIS — L70.9 ACNE, UNSPECIFIED ACNE TYPE: ICD-10-CM

## 2023-05-18 DIAGNOSIS — M54.40 BILATERAL LOW BACK PAIN WITH SCIATICA, SCIATICA LATERALITY UNSPECIFIED, UNSPECIFIED CHRONICITY: ICD-10-CM

## 2023-05-18 PROCEDURE — 1159F MED LIST DOCD IN RCRD: CPT | Performed by: INTERNAL MEDICINE

## 2023-05-18 PROCEDURE — 99214 OFFICE O/P EST MOD 30 MIN: CPT | Performed by: INTERNAL MEDICINE

## 2023-05-18 PROCEDURE — 1160F RVW MEDS BY RX/DR IN RCRD: CPT | Performed by: INTERNAL MEDICINE

## 2023-05-18 RX ORDER — HYDROCODONE BITARTRATE AND ACETAMINOPHEN 10; 325 MG/1; MG/1
1 TABLET ORAL EVERY 8 HOURS PRN
Qty: 90 TABLET | Refills: 0 | Status: SHIPPED | OUTPATIENT
Start: 2023-05-18

## 2023-05-18 RX ORDER — SPIRONOLACTONE 50 MG/1
100 TABLET, FILM COATED ORAL DAILY PRN
Qty: 180 TABLET | Refills: 3 | Status: SHIPPED | OUTPATIENT
Start: 2023-05-18

## 2023-05-18 NOTE — PROGRESS NOTES
Back Pain, Leg Pain (bilateral), Anxiety (Would like to know if genetic testing can be dome to see what meds are best for her), and Breast Mass (right)    Subjective   Subha Patino is a 43 y.o. female is here today for follow-up.    History of Present Illness   Rt breast lump , felt last week  Would like pharmacogenetic testing    The patient presents today for a follow-up.    She is feeling a little nervous. She thinks it could be because she is behind on her menstrual cycle. She does not get the COVID-19 vaccines. She feels a lump in her right axilla. She does not drink a lot of caffeine, but she has drank more lately than she normally does. She drinks 1 to 2 Coke a week. She had a mammogram in 10/2022.    She would like a genetic test for anxiety. She is utilizing BuSpar, but it does not work best for her.    She needs a refill on spironolactone.    She denies any abdominal pain or back pain.    The patient had a mammogram in 10/2022.    Current Outpatient Medications:   •  aspirin 81 MG EC tablet, Take 1 tablet by mouth Daily., Disp: , Rfl:   •  busPIRone (BUSPAR) 15 MG tablet, TAKE 1 TABLET BY MOUTH TWICE A DAY, Disp: 60 tablet, Rfl: 1  •  Cyanocobalamin (Vitamin B-12) 5000 MCG sublingual tablet, Take 5,000 mcg by mouth Daily., Disp: 90 tablet, Rfl: 3  •  cyclobenzaprine (FLEXERIL) 10 MG tablet, Take 1 tablet by mouth Daily., Disp: 30 tablet, Rfl: 4  •  HYDROcodone-acetaminophen (NORCO)  MG per tablet, Take 1 tablet by mouth Every 8 (Eight) Hours As Needed for Moderate Pain., Disp: 90 tablet, Rfl: 0  •  Naloxone HCl 0.4 MG/0.4ML solution auto-injector, Inject 0.4 mL as directed 1 (One) Time As Needed (confusion) for up to 1 dose., Disp: 0.4 mL, Rfl: 1  •  nitrofurantoin (Macrodantin) 100 MG capsule, 1 PO prn intercourse, Disp: 20 capsule, Rfl: 1  •  pantoprazole (PROTONIX) 20 MG EC tablet, Take 1 tablet by mouth Daily., Disp: 90 tablet, Rfl: 1  •  propranolol (INDERAL) 10 MG tablet, TAKE 1 TO 2 TABLETS  "BY MOUTH 2 TIMES A DAY AS NEEDED., Disp: 60 tablet, Rfl: 1  •  spironolactone (ALDACTONE) 50 MG tablet, Take 2 tablets by mouth Daily As Needed (acne)., Disp: 180 tablet, Rfl: 3  •  Synthroid 112 MCG tablet, TAKE 1 TABLET BY MOUTH DAILY ON EMPTY STOMACH, Disp: 90 tablet, Rfl: 1      The following portions of the patient's history were reviewed and updated as appropriate: allergies, current medications, past family history, past medical history, past social history, past surgical history and problem list.    Review of Systems   Constitutional: Negative.  Negative for chills and fever.   HENT: Negative for ear discharge, ear pain, sinus pressure and sore throat.    Respiratory: Negative for cough, chest tightness and shortness of breath.    Cardiovascular: Negative for chest pain, palpitations and leg swelling.        Rt breast mass   Gastrointestinal: Negative for diarrhea, nausea and vomiting.   Musculoskeletal: Negative for arthralgias, back pain and myalgias.   Neurological: Negative for dizziness, syncope and headaches.   Psychiatric/Behavioral: Negative for confusion and sleep disturbance.       Objective   /64   Pulse 89   Temp 97.5 °F (36.4 °C)   Ht 165.1 cm (65\")   Wt 71.2 kg (157 lb)   SpO2 95% Comment: ra  BMI 26.13 kg/m²   Physical Exam  Vitals and nursing note reviewed.   Constitutional:       Appearance: She is well-developed.   HENT:      Head: Normocephalic and atraumatic.      Right Ear: External ear normal.      Left Ear: External ear normal.      Mouth/Throat:      Pharynx: No oropharyngeal exudate.   Eyes:      Conjunctiva/sclera: Conjunctivae normal.      Pupils: Pupils are equal, round, and reactive to light.   Neck:      Thyroid: No thyromegaly.   Cardiovascular:      Rate and Rhythm: Normal rate and regular rhythm.      Pulses: Normal pulses.      Heart sounds: Normal heart sounds. No murmur heard.    No friction rub. No gallop.      Comments: Rt breast lump- 1 cm rt upper quadrant " 2 inches.  Pulmonary:      Effort: Pulmonary effort is normal.      Breath sounds: Normal breath sounds.   Abdominal:      General: Bowel sounds are normal. There is no distension.      Palpations: Abdomen is soft.      Tenderness: There is no abdominal tenderness.   Musculoskeletal:         General: Tenderness present.      Cervical back: Neck supple.   Skin:     General: Skin is warm and dry.   Neurological:      Mental Status: She is alert and oriented to person, place, and time.      Cranial Nerves: No cranial nerve deficit.   Psychiatric:         Judgment: Judgment normal.           Results for orders placed or performed in visit on 03/13/23   Follicle Stimulating Hormone    Specimen: Blood   Result Value Ref Range    FSH 7.73 mIU/mL   Estradiol    Specimen: Blood   Result Value Ref Range    Estradiol 79.0 pg/mL             Assessment & Plan   Diagnoses and all orders for this visit:    Mass of upper outer quadrant of right breast  -     Mammo diagnostic digital tomosynthesis right w CAD; Future    Bilateral low back pain with sciatica, sciatica laterality unspecified, unspecified chronicity  Comments:  well controlled on current regimen.  Orders:  -     HYDROcodone-acetaminophen (NORCO)  MG per tablet; Take 1 tablet by mouth Every 8 (Eight) Hours As Needed for Moderate Pain.    Acne, unspecified acne type  -     spironolactone (ALDACTONE) 50 MG tablet; Take 2 tablets by mouth Daily As Needed (acne).    1. Right axillary lump  - Will order a diagnostic mammogram.    2. Anxiety  - Will order pharmacogenetic testing.  - Will refill BuSpar.  - Will refill Inderal.  - Will refill Inderal.    The patient will follow-up in 2-months.             Return in about 2 months (around 7/18/2023).    Electronically signed by:    Catrina Tdod MD     Transcribed from ambient dictation for Catrina Todd MD by Eva العلي, Quality .  05/18/23   11:52 EDT    Patient or patient  representative verbalized consent to the visit recording.  I have personally performed the services described in this document as transcribed by the above individual, and it is both accurate and complete.  Catrina Todd MD  5/20/2023  16:22 EDT

## 2023-06-19 ENCOUNTER — TELEPHONE (OUTPATIENT)
Dept: INTERNAL MEDICINE | Facility: CLINIC | Age: 43
End: 2023-06-19
Payer: COMMERCIAL

## 2023-06-19 DIAGNOSIS — M54.40 BILATERAL LOW BACK PAIN WITH SCIATICA, SCIATICA LATERALITY UNSPECIFIED, UNSPECIFIED CHRONICITY: ICD-10-CM

## 2023-06-19 RX ORDER — HYDROCODONE BITARTRATE AND ACETAMINOPHEN 10; 325 MG/1; MG/1
1 TABLET ORAL EVERY 8 HOURS PRN
Qty: 90 TABLET | Refills: 0 | Status: SHIPPED | OUTPATIENT
Start: 2023-06-19

## 2023-06-19 NOTE — TELEPHONE ENCOUNTER
Caller: Subha Patino    Relationship: Self    Best call back number: [phone]    Requested Prescriptions:   Requested Prescriptions     Pending Prescriptions Disp Refills    HYDROcodone-acetaminophen (NORCO)  MG per tablet 90 tablet 0     Sig: Take 1 tablet by mouth Every 8 (Eight) Hours As Needed for Moderate Pain.        Pharmacy where request should be sent: [facility]/PHARMACY #[id number] - [place], [place] - [address] AT NEXT TO [facility] - [phone] [facility] [phone] FX     Last office visit with prescribing clinician: 5/18/2023   Last telemedicine visit with prescribing clinician: Visit date not found   Next office visit with prescribing clinician: 7/31/2023     Additional details provided by patient:     Does the patient have less than a 3 day supply:  [x] Yes  [] No    Would you like a call back once the refill request has been completed: [x] Yes [] No    If the office needs to give you a call back, can they leave a voicemail: [] Yes [x] No    Joaquin Talley, NUPUR   06/19/23 13:49 EDT

## 2023-06-19 NOTE — TELEPHONE ENCOUNTER
Last appointment: 5/18/2023  Next appointment: 7/31/2023     UDS: 9/19/2022   CSC:     Last Refill: 5/18/2023

## 2023-07-31 ENCOUNTER — OFFICE VISIT (OUTPATIENT)
Dept: INTERNAL MEDICINE | Facility: CLINIC | Age: 43
End: 2023-07-31
Payer: COMMERCIAL

## 2023-07-31 ENCOUNTER — LAB (OUTPATIENT)
Dept: LAB | Facility: HOSPITAL | Age: 43
End: 2023-07-31
Payer: COMMERCIAL

## 2023-07-31 VITALS
OXYGEN SATURATION: 97 % | TEMPERATURE: 97.7 F | HEART RATE: 62 BPM | SYSTOLIC BLOOD PRESSURE: 126 MMHG | BODY MASS INDEX: 25.76 KG/M2 | DIASTOLIC BLOOD PRESSURE: 78 MMHG | HEIGHT: 65 IN | WEIGHT: 154.6 LBS

## 2023-07-31 DIAGNOSIS — E55.9 VITAMIN D DEFICIENCY: ICD-10-CM

## 2023-07-31 DIAGNOSIS — F41.0 PANIC ATTACKS: ICD-10-CM

## 2023-07-31 DIAGNOSIS — M54.40 BILATERAL LOW BACK PAIN WITH SCIATICA, SCIATICA LATERALITY UNSPECIFIED, UNSPECIFIED CHRONICITY: ICD-10-CM

## 2023-07-31 DIAGNOSIS — Z79.899 ENCOUNTER FOR LONG-TERM (CURRENT) USE OF OTHER MEDICATIONS: ICD-10-CM

## 2023-07-31 DIAGNOSIS — F41.1 GENERALIZED ANXIETY DISORDER: ICD-10-CM

## 2023-07-31 DIAGNOSIS — K21.00 GASTROESOPHAGEAL REFLUX DISEASE WITH ESOPHAGITIS WITHOUT HEMORRHAGE: ICD-10-CM

## 2023-07-31 DIAGNOSIS — Z00.00 ROUTINE GENERAL MEDICAL EXAMINATION AT A HEALTH CARE FACILITY: ICD-10-CM

## 2023-07-31 DIAGNOSIS — Z00.00 ROUTINE GENERAL MEDICAL EXAMINATION AT A HEALTH CARE FACILITY: Primary | ICD-10-CM

## 2023-07-31 LAB
25(OH)D3 SERPL-MCNC: 79.7 NG/ML (ref 30–100)
ALBUMIN SERPL-MCNC: 4.7 G/DL (ref 3.5–5.2)
ALBUMIN/GLOB SERPL: 2 G/DL
ALP SERPL-CCNC: 45 U/L (ref 39–117)
ALT SERPL W P-5'-P-CCNC: 14 U/L (ref 1–33)
ANION GAP SERPL CALCULATED.3IONS-SCNC: 11.4 MMOL/L (ref 5–15)
AST SERPL-CCNC: 24 U/L (ref 1–32)
BILIRUB BLD-MCNC: NEGATIVE MG/DL
BILIRUB SERPL-MCNC: 0.6 MG/DL (ref 0–1.2)
BUN SERPL-MCNC: 12 MG/DL (ref 6–20)
BUN/CREAT SERPL: 13.6 (ref 7–25)
CALCIUM SPEC-SCNC: 9.4 MG/DL (ref 8.6–10.5)
CHLORIDE SERPL-SCNC: 102 MMOL/L (ref 98–107)
CHOLEST SERPL-MCNC: 116 MG/DL (ref 0–200)
CLARITY, POC: CLEAR
CO2 SERPL-SCNC: 22.6 MMOL/L (ref 22–29)
COLOR UR: YELLOW
CREAT SERPL-MCNC: 0.88 MG/DL (ref 0.57–1)
DEPRECATED RDW RBC AUTO: 39 FL (ref 37–54)
EGFRCR SERPLBLD CKD-EPI 2021: 83.2 ML/MIN/1.73
ERYTHROCYTE [DISTWIDTH] IN BLOOD BY AUTOMATED COUNT: 11.4 % (ref 12.3–15.4)
EXPIRATION DATE: ABNORMAL
GLOBULIN UR ELPH-MCNC: 2.3 GM/DL
GLUCOSE SERPL-MCNC: 88 MG/DL (ref 65–99)
GLUCOSE UR STRIP-MCNC: NEGATIVE MG/DL
HBA1C MFR BLD: 4.6 % (ref 4.8–5.6)
HCT VFR BLD AUTO: 41.2 % (ref 34–46.6)
HDLC SERPL-MCNC: 47 MG/DL (ref 40–60)
HGB BLD-MCNC: 14.1 G/DL (ref 12–15.9)
KETONES UR QL: NEGATIVE
LDLC SERPL CALC-MCNC: 51 MG/DL (ref 0–100)
LDLC/HDLC SERPL: 1.06 {RATIO}
LEUKOCYTE EST, POC: NEGATIVE
Lab: ABNORMAL
MCH RBC QN AUTO: 32.4 PG (ref 26.6–33)
MCHC RBC AUTO-ENTMCNC: 34.2 G/DL (ref 31.5–35.7)
MCV RBC AUTO: 94.7 FL (ref 79–97)
NITRITE UR-MCNC: NEGATIVE MG/ML
PH UR: 6 [PH] (ref 5–8)
PLATELET # BLD AUTO: 286 10*3/MM3 (ref 140–450)
PMV BLD AUTO: 11.8 FL (ref 6–12)
POTASSIUM SERPL-SCNC: 3.9 MMOL/L (ref 3.5–5.2)
PROT SERPL-MCNC: 7 G/DL (ref 6–8.5)
PROT UR STRIP-MCNC: ABNORMAL MG/DL
RBC # BLD AUTO: 4.35 10*6/MM3 (ref 3.77–5.28)
RBC # UR STRIP: NEGATIVE /UL
SODIUM SERPL-SCNC: 136 MMOL/L (ref 136–145)
SP GR UR: 1.02 (ref 1–1.03)
T4 FREE SERPL-MCNC: 2.29 NG/DL (ref 0.93–1.7)
TRIGL SERPL-MCNC: 97 MG/DL (ref 0–150)
TSH SERPL DL<=0.05 MIU/L-ACNC: 0.18 UIU/ML (ref 0.27–4.2)
UROBILINOGEN UR QL: NORMAL
VIT B12 BLD-MCNC: 1256 PG/ML (ref 211–946)
VLDLC SERPL-MCNC: 18 MG/DL (ref 5–40)
WBC NRBC COR # BLD: 7.89 10*3/MM3 (ref 3.4–10.8)

## 2023-07-31 PROCEDURE — 84439 ASSAY OF FREE THYROXINE: CPT

## 2023-07-31 PROCEDURE — 81003 URINALYSIS AUTO W/O SCOPE: CPT | Performed by: INTERNAL MEDICINE

## 2023-07-31 PROCEDURE — 80061 LIPID PANEL: CPT

## 2023-07-31 PROCEDURE — 82306 VITAMIN D 25 HYDROXY: CPT

## 2023-07-31 PROCEDURE — 80050 GENERAL HEALTH PANEL: CPT

## 2023-07-31 PROCEDURE — 82607 VITAMIN B-12: CPT

## 2023-07-31 PROCEDURE — 83036 HEMOGLOBIN GLYCOSYLATED A1C: CPT

## 2023-07-31 PROCEDURE — 99396 PREV VISIT EST AGE 40-64: CPT | Performed by: INTERNAL MEDICINE

## 2023-07-31 RX ORDER — HYDROCODONE BITARTRATE AND ACETAMINOPHEN 10; 325 MG/1; MG/1
1 TABLET ORAL EVERY 8 HOURS PRN
Qty: 90 TABLET | Refills: 0 | Status: CANCELLED | OUTPATIENT
Start: 2023-07-31

## 2023-07-31 RX ORDER — PANTOPRAZOLE SODIUM 20 MG/1
20 TABLET, DELAYED RELEASE ORAL DAILY
Qty: 90 TABLET | Refills: 1 | Status: SHIPPED | OUTPATIENT
Start: 2023-07-31

## 2023-07-31 RX ORDER — BUSPIRONE HYDROCHLORIDE 15 MG/1
15 TABLET ORAL 2 TIMES DAILY
Qty: 180 TABLET | Refills: 1 | Status: SHIPPED | OUTPATIENT
Start: 2023-07-31

## 2023-07-31 RX ORDER — HYDROCODONE BITARTRATE AND ACETAMINOPHEN 10; 325 MG/1; MG/1
1 TABLET ORAL EVERY 8 HOURS PRN
Qty: 90 TABLET | Refills: 0 | Status: SHIPPED | OUTPATIENT
Start: 2023-07-31

## 2023-07-31 RX ORDER — PROPRANOLOL HYDROCHLORIDE 10 MG/1
10-20 TABLET ORAL 2 TIMES DAILY PRN
Qty: 60 TABLET | Refills: 1 | Status: SHIPPED | OUTPATIENT
Start: 2023-07-31

## 2023-08-06 LAB — DRUGS UR: NORMAL

## 2023-08-07 ENCOUNTER — TELEPHONE (OUTPATIENT)
Dept: ENDOCRINOLOGY | Facility: CLINIC | Age: 43
End: 2023-08-07
Payer: COMMERCIAL

## 2023-08-07 RX ORDER — LEVOTHYROXINE SODIUM 100 MCG
100 TABLET ORAL DAILY
Qty: 30 TABLET | Refills: 11 | Status: SHIPPED | OUTPATIENT
Start: 2023-08-07 | End: 2024-08-06

## 2023-08-08 DIAGNOSIS — F41.1 GENERALIZED ANXIETY DISORDER: ICD-10-CM

## 2023-08-08 DIAGNOSIS — F41.0 PANIC ATTACKS: ICD-10-CM

## 2023-08-08 RX ORDER — PROPRANOLOL HYDROCHLORIDE 10 MG/1
10-20 TABLET ORAL 2 TIMES DAILY PRN
Qty: 60 TABLET | Refills: 1 | OUTPATIENT
Start: 2023-08-08

## 2023-08-08 NOTE — TELEPHONE ENCOUNTER
The labs suggest that thyroid function is high and we need to reduce Synthroid dose to 100 mcg daily. I can send prescription for a new dose.

## 2023-08-09 ENCOUNTER — PRIOR AUTHORIZATION (OUTPATIENT)
Dept: ENDOCRINOLOGY | Facility: CLINIC | Age: 43
End: 2023-08-09
Payer: COMMERCIAL

## 2023-08-09 NOTE — TELEPHONE ENCOUNTER
Subha Patino Key: [id number] - PA Case ID: [id number] - Rx #: [id number] help? Call us at [phone]  Outcome  Approvedtoday  The request has been approved. The authorization is effective from 08/09/2023 to 08/08/2024, as long as the member is enrolled in their current health plan. The request was approved as submitted. A written notification letter will follow with additional details.  Drug  Synthroid 100MCG tablets  Form  [employer] [place] Medicaid ePA Form 2017 NCPDP

## 2023-08-17 LAB — DRUGS UR: NORMAL

## 2023-08-23 ENCOUNTER — HOSPITAL ENCOUNTER (OUTPATIENT)
Dept: MAMMOGRAPHY | Facility: HOSPITAL | Age: 43
Discharge: HOME | End: 2023-08-23
Payer: COMMERCIAL

## 2023-08-23 ENCOUNTER — HOSPITAL ENCOUNTER (OUTPATIENT)
Dept: ULTRASOUND IMAGING | Facility: HOSPITAL | Age: 43
Discharge: HOME | End: 2023-08-23
Payer: COMMERCIAL

## 2023-08-23 DIAGNOSIS — N63.11 MASS OF UPPER OUTER QUADRANT OF RIGHT BREAST: ICD-10-CM

## 2023-08-23 PROCEDURE — 77065 DX MAMMO INCL CAD UNI: CPT

## 2023-08-23 PROCEDURE — 76642 ULTRASOUND BREAST LIMITED: CPT

## 2023-08-23 PROCEDURE — G0279 TOMOSYNTHESIS, MAMMO: HCPCS

## 2023-08-31 DIAGNOSIS — M54.40 BILATERAL LOW BACK PAIN WITH SCIATICA, SCIATICA LATERALITY UNSPECIFIED, UNSPECIFIED CHRONICITY: ICD-10-CM

## 2023-08-31 RX ORDER — HYDROCODONE BITARTRATE AND ACETAMINOPHEN 10; 325 MG/1; MG/1
1 TABLET ORAL EVERY 8 HOURS PRN
Qty: 90 TABLET | Refills: 0 | Status: SHIPPED | OUTPATIENT
Start: 2023-08-31

## 2023-08-31 NOTE — TELEPHONE ENCOUNTER
Caller: Subha Patino    Relationship: Self    Best call back number: [phone]     Requested Prescriptions:   Requested Prescriptions     Pending Prescriptions Disp Refills    HYDROcodone-acetaminophen (NORCO)  MG per tablet 90 tablet 0     Sig: Take 1 tablet by mouth Every 8 (Eight) Hours As Needed for Moderate Pain.      Pharmacy where request should be sent: [facility]/PHARMACY #[id number] - [place], [place] - [address] AT [address]EXT TO [facility] - [phone] [facility] [phone]      Last office visit with prescribing clinician: 7/31/2023   Last telemedicine visit with prescribing clinician: Visit date not found   Next office visit with prescribing clinician: 9/18/2023     Additional details provided by patient: PATIENT HAS ONE DAY LEFT.     Does the patient have less than a 3 day supply:  [x] Yes  [] No    Would you like a call back once the refill request has been completed: [] Yes [x] No    If the office needs to give you a call back, can they leave a voicemail: [] Yes [x] No    Gissel Dodson Rep   08/31/23 13:38 EDT

## 2023-09-18 ENCOUNTER — LAB (OUTPATIENT)
Dept: LAB | Facility: HOSPITAL | Age: 43
End: 2023-09-18
Payer: COMMERCIAL

## 2023-09-18 ENCOUNTER — OFFICE VISIT (OUTPATIENT)
Dept: INTERNAL MEDICINE | Facility: CLINIC | Age: 43
End: 2023-09-18
Payer: COMMERCIAL

## 2023-09-18 VITALS
TEMPERATURE: 97.7 F | WEIGHT: 150.6 LBS | SYSTOLIC BLOOD PRESSURE: 114 MMHG | HEIGHT: 65 IN | DIASTOLIC BLOOD PRESSURE: 72 MMHG | OXYGEN SATURATION: 98 % | BODY MASS INDEX: 25.09 KG/M2 | HEART RATE: 74 BPM

## 2023-09-18 DIAGNOSIS — F41.1 GENERALIZED ANXIETY DISORDER: ICD-10-CM

## 2023-09-18 DIAGNOSIS — E03.9 ACQUIRED HYPOTHYROIDISM: ICD-10-CM

## 2023-09-18 DIAGNOSIS — Z23 NEED FOR VACCINATION: Primary | ICD-10-CM

## 2023-09-18 DIAGNOSIS — F41.0 PANIC ATTACKS: ICD-10-CM

## 2023-09-18 DIAGNOSIS — M54.40 BILATERAL LOW BACK PAIN WITH SCIATICA, SCIATICA LATERALITY UNSPECIFIED, UNSPECIFIED CHRONICITY: ICD-10-CM

## 2023-09-18 DIAGNOSIS — R61 HYPERHIDROSIS: ICD-10-CM

## 2023-09-18 LAB — TSH SERPL DL<=0.05 MIU/L-ACNC: 2.32 UIU/ML (ref 0.27–4.2)

## 2023-09-18 PROCEDURE — 84443 ASSAY THYROID STIM HORMONE: CPT

## 2023-09-18 PROCEDURE — 99214 OFFICE O/P EST MOD 30 MIN: CPT | Performed by: INTERNAL MEDICINE

## 2023-09-18 PROCEDURE — 90715 TDAP VACCINE 7 YRS/> IM: CPT | Performed by: INTERNAL MEDICINE

## 2023-09-18 PROCEDURE — 90471 IMMUNIZATION ADMIN: CPT | Performed by: INTERNAL MEDICINE

## 2023-09-18 RX ORDER — PROPRANOLOL HYDROCHLORIDE 10 MG/1
10-20 TABLET ORAL 2 TIMES DAILY PRN
Qty: 60 TABLET | Refills: 1 | Status: SHIPPED | OUTPATIENT
Start: 2023-09-18

## 2023-09-18 RX ORDER — HYDROCODONE BITARTRATE AND ACETAMINOPHEN 10; 325 MG/1; MG/1
1 TABLET ORAL EVERY 8 HOURS PRN
Qty: 90 TABLET | Refills: 0 | Status: SHIPPED | OUTPATIENT
Start: 2023-09-18

## 2023-09-18 NOTE — PROGRESS NOTES
Back Pain, Anxiety, body odor, and Blurred Vision    Subjective   Subha Patino is a 43 y.o. female is here today for follow-up.    History of Present Illness  Subha presents for a follow up on her lbp, and stress. Notes she sweats a lot.her last thyroid was high.dose reduced to 100 by Dr. Roth, would like lab repeated.  Heart rate is staying high. Does Pot on occasion.      Current Outpatient Medications:     aspirin 81 MG EC tablet, Take 1 tablet by mouth Daily., Disp: , Rfl:     busPIRone (BUSPAR) 15 MG tablet, Take 1 tablet by mouth 2 (Two) Times a Day., Disp: 180 tablet, Rfl: 1    Cyanocobalamin (Vitamin B-12) 5000 MCG sublingual tablet, Take 5,000 mcg by mouth Daily., Disp: 90 tablet, Rfl: 3    cyclobenzaprine (FLEXERIL) 10 MG tablet, Take 1 tablet by mouth Daily., Disp: 30 tablet, Rfl: 4    HYDROcodone-acetaminophen (NORCO)  MG per tablet, Take 1 tablet by mouth Every 8 (Eight) Hours As Needed for Moderate Pain., Disp: 90 tablet, Rfl: 0    Naloxone HCl 0.4 MG/0.4ML solution auto-injector, Inject 0.4 mL as directed 1 (One) Time As Needed (confusion) for up to 1 dose., Disp: 0.4 mL, Rfl: 1    nitrofurantoin (Macrodantin) 100 MG capsule, 1 PO prn intercourse, Disp: 20 capsule, Rfl: 1    pantoprazole (PROTONIX) 20 MG EC tablet, Take 1 tablet by mouth Daily., Disp: 90 tablet, Rfl: 1    propranolol (INDERAL) 10 MG tablet, Take 1-2 tablets by mouth 2 (Two) Times a Day As Needed (palps)., Disp: 60 tablet, Rfl: 1    spironolactone (ALDACTONE) 50 MG tablet, Take 2 tablets by mouth Daily As Needed (acne)., Disp: 180 tablet, Rfl: 3    Synthroid 100 MCG tablet, Take 1 tablet by mouth Daily., Disp: 30 tablet, Rfl: 11      The following portions of the patient's history were reviewed and updated as appropriate: allergies, current medications, past family history, past medical history, past social history, past surgical history and problem list.    Review of Systems   Constitutional: Negative.  Negative for chills  "and fever.   HENT:  Negative for ear discharge, ear pain, sinus pressure and sore throat.    Respiratory:  Negative for cough, chest tightness and shortness of breath.    Cardiovascular:  Positive for palpitations. Negative for chest pain and leg swelling.   Gastrointestinal:  Negative for diarrhea, nausea and vomiting.   Musculoskeletal:  Negative for arthralgias, back pain and myalgias.   Neurological:  Negative for dizziness, syncope and headaches.   Psychiatric/Behavioral:  Negative for confusion and sleep disturbance.         Feels hyper     Objective   /72   Pulse 74   Temp 97.7 °F (36.5 °C)   Ht 165.1 cm (65\")   Wt 68.3 kg (150 lb 9.6 oz)   SpO2 98% Comment: ra  BMI 25.06 kg/m²   Physical Exam  Vitals and nursing note reviewed.   Constitutional:       Appearance: She is well-developed.   HENT:      Head: Normocephalic and atraumatic.      Right Ear: External ear normal.      Left Ear: External ear normal.      Mouth/Throat:      Pharynx: No oropharyngeal exudate.   Eyes:      Conjunctiva/sclera: Conjunctivae normal.      Pupils: Pupils are equal, round, and reactive to light.   Neck:      Thyroid: No thyromegaly.   Cardiovascular:      Rate and Rhythm: Normal rate and regular rhythm.      Pulses: Normal pulses.      Heart sounds: Normal heart sounds. No murmur heard.    No friction rub. No gallop.   Pulmonary:      Effort: Pulmonary effort is normal.      Breath sounds: Normal breath sounds.   Abdominal:      General: Bowel sounds are normal. There is no distension.      Palpations: Abdomen is soft.      Tenderness: There is no abdominal tenderness.   Musculoskeletal:      Cervical back: Neck supple.   Skin:     General: Skin is warm and dry.   Neurological:      Mental Status: She is alert and oriented to person, place, and time.      Cranial Nerves: No cranial nerve deficit.   Psychiatric:         Judgment: Judgment normal.         Results for orders placed or performed in visit on 07/31/23 "   Compliance Drug Analysis, Ur -   Result Value Ref Range    Report Summary FINAL              Assessment & Plan   Diagnoses and all orders for this visit:    Need for vaccination  -     Tdap Vaccine => 6yo IM (BOOSTRIX)    Bilateral low back pain with sciatica, sciatica laterality unspecified, unspecified chronicity  Comments:  well controlled on current regimen.  Orders:  -     HYDROcodone-acetaminophen (NORCO)  MG per tablet; Take 1 tablet by mouth Every 8 (Eight) Hours As Needed for Moderate Pain.    Generalized anxiety disorder  -     propranolol (INDERAL) 10 MG tablet; Take 1-2 tablets by mouth 2 (Two) Times a Day As Needed (palps).    Panic attacks  -     propranolol (INDERAL) 10 MG tablet; Take 1-2 tablets by mouth 2 (Two) Times a Day As Needed (palps).    Acquired hypothyroidism  -     TSH Rfx On Abnormal To Free T4; Future    Hyperhidrosis  Comments:  continue frequent use of otc antiperspirants. declines aluminum based.  Orders:  -     TSH Rfx On Abnormal To Free T4; Future      Rechck thyroid levels due to her tachy and increased sweating.     The patient has read and signed the [facility] Controlled Substance Contract.  I will continue to see patient for regular follow up appointments.  They are well controlled on their medication.  MARJORIE has been reviewed by me and is updated every 3 months. The patient is aware of the potential for addiction and dependence.   Counseled re: THC use, pt upfront about it.      Return in about 2 months (around 11/18/2023) for Recheck- end Nov.    Electronically signed by:    Catrina Todd MD

## 2023-10-19 DIAGNOSIS — F41.0 PANIC ATTACKS: ICD-10-CM

## 2023-10-19 DIAGNOSIS — F41.1 GENERALIZED ANXIETY DISORDER: ICD-10-CM

## 2023-10-19 RX ORDER — PROPRANOLOL HYDROCHLORIDE 10 MG/1
10-20 TABLET ORAL 2 TIMES DAILY PRN
Qty: 180 TABLET | Refills: 1 | Status: SHIPPED | OUTPATIENT
Start: 2023-10-19

## 2023-11-03 DIAGNOSIS — M54.40 BILATERAL LOW BACK PAIN WITH SCIATICA, SCIATICA LATERALITY UNSPECIFIED, UNSPECIFIED CHRONICITY: ICD-10-CM

## 2023-11-03 NOTE — TELEPHONE ENCOUNTER
Caller: Subha Patino    Relationship: Self    Best call back number: [phone]     Requested Prescriptions:   Requested Prescriptions     Pending Prescriptions Disp Refills    HYDROcodone-acetaminophen (NORCO)  MG per tablet 90 tablet 0     Sig: Take 1 tablet by mouth Every 8 (Eight) Hours As Needed for Moderate Pain.      Pharmacy where request should be sent: [facility] #[id number] - [place], [place] - [address] AT NEXT TO [facility] - [phone] [facility] [phone]      Last office visit with prescribing clinician: 9/18/2023   Last telemedicine visit with prescribing clinician: Visit date not found   Next office visit with prescribing clinician: 11/20/2023     Gissel Coy Rep   11/03/23 08:18 EDT

## 2023-11-04 RX ORDER — HYDROCODONE BITARTRATE AND ACETAMINOPHEN 10; 325 MG/1; MG/1
1 TABLET ORAL EVERY 8 HOURS PRN
Qty: 90 TABLET | Refills: 0 | Status: SHIPPED | OUTPATIENT
Start: 2023-11-04

## 2023-11-06 ENCOUNTER — TELEPHONE (OUTPATIENT)
Dept: INTERNAL MEDICINE | Facility: CLINIC | Age: 43
End: 2023-11-06
Payer: COMMERCIAL

## 2023-11-06 ENCOUNTER — PATIENT MESSAGE (OUTPATIENT)
Dept: INTERNAL MEDICINE | Facility: CLINIC | Age: 43
End: 2023-11-06
Payer: COMMERCIAL

## 2023-11-06 DIAGNOSIS — M54.40 BILATERAL LOW BACK PAIN WITH SCIATICA, SCIATICA LATERALITY UNSPECIFIED, UNSPECIFIED CHRONICITY: ICD-10-CM

## 2023-11-06 NOTE — TELEPHONE ENCOUNTER
Caller: Subha Patino    Relationship: Self    Best call back number: [phone]     What is the best time to reach you: ANYTIME    Who are you requesting to speak with (clinical staff, provider,  specific staff member): PCP OR MA    Do you know the name of the person who called:     What was the call regarding: PATIENT CALLED IN [facility] ADVISED THAT THE PRESCRIBED DOSAGE FOR HER HYDROCODONE 10MG IS ON BACK ORDER PATIENT WOULD LIKE TO KNOW WHAT OTHER STEPS SHOULD SHE TAKE TO GET HER MEDICATION    Is it okay if the provider responds through MyChart: PHONE CALL

## 2023-11-07 ENCOUNTER — TELEPHONE (OUTPATIENT)
Dept: INTERNAL MEDICINE | Facility: CLINIC | Age: 43
End: 2023-11-07
Payer: COMMERCIAL

## 2023-11-07 RX ORDER — NALOXONE HYDROCHLORIDE 0.4 MG/ML
0.4 INJECTION, SOLUTION INTRAMUSCULAR; INTRAVENOUS; SUBCUTANEOUS AS NEEDED
Qty: 1 ML | Refills: 0 | Status: SHIPPED | OUTPATIENT
Start: 2023-11-07 | End: 2023-11-07 | Stop reason: SDUPTHER

## 2023-11-07 RX ORDER — HYDROCODONE BITARTRATE AND ACETAMINOPHEN 10; 325 MG/1; MG/1
1 TABLET ORAL EVERY 8 HOURS PRN
Qty: 90 TABLET | Refills: 0 | Status: SHIPPED | OUTPATIENT
Start: 2023-11-07

## 2023-11-07 RX ORDER — NALOXONE HYDROCHLORIDE 0.4 MG/ML
0.4 INJECTION, SOLUTION INTRAMUSCULAR; INTRAVENOUS; SUBCUTANEOUS AS NEEDED
Qty: 1 ML | Refills: 0 | Status: SHIPPED | OUTPATIENT
Start: 2023-11-07

## 2023-11-07 RX ORDER — HYDROCODONE BITARTRATE AND ACETAMINOPHEN 10; 325 MG/1; MG/1
1 TABLET ORAL EVERY 8 HOURS PRN
Qty: 90 TABLET | Refills: 0 | Status: SHIPPED | OUTPATIENT
Start: 2023-11-07 | End: 2023-11-07 | Stop reason: SDUPTHER

## 2023-11-07 NOTE — TELEPHONE ENCOUNTER
Left VM to call office. Need to find another pharmacy that has it in stock and we can send it there. OK for HUB to relay this message.

## 2023-11-07 NOTE — TELEPHONE ENCOUNTER
Please have her check if [facility] or any other pharmacy has that dose , and I can send the rx to that pharmacy .  thanks

## 2023-11-08 NOTE — TELEPHONE ENCOUNTER
Rx for HC/apap sent to the [facility] pharmacy.  Please call [facility] in [place] and cancel the 2 Rx for hydrocodone sent there.

## 2023-11-08 NOTE — TELEPHONE ENCOUNTER
----- Message from Renee Christy MA sent at 11/7/2023 10:52 AM EST -----  Regarding: FW: Hydrocodone  Contact: [phone]    ----- Message -----  From: Subha Patino  Sent: 11/7/2023  10:48 AM EST  To: [facility]  Subject: Hydrocodone                                      This is actually where I’d like to move all my medications to one day if possible

## 2023-11-08 NOTE — TELEPHONE ENCOUNTER
From: Subha Patino  To: Catrina Todd  Sent: 11/6/2023 3:19 PM EST  Subject: Hydrocodone    Hi Dr Todd, this is Subha Patino, [facility] and the Hydrocodone 10mg is on back order. Wasn’t sure what I needed to do, call another [facility] Or what?  told me to send on here as well cause it could take 48 hours before u see their message. My number is [phone]. Thank you

## 2023-11-09 ENCOUNTER — PRIOR AUTHORIZATION (OUTPATIENT)
Dept: INTERNAL MEDICINE | Facility: CLINIC | Age: 43
End: 2023-11-09

## 2023-11-20 ENCOUNTER — OFFICE VISIT (OUTPATIENT)
Dept: INTERNAL MEDICINE | Facility: CLINIC | Age: 43
End: 2023-11-20
Payer: COMMERCIAL

## 2023-11-20 VITALS
WEIGHT: 157.2 LBS | HEART RATE: 74 BPM | OXYGEN SATURATION: 100 % | DIASTOLIC BLOOD PRESSURE: 74 MMHG | TEMPERATURE: 97.5 F | HEIGHT: 65 IN | BODY MASS INDEX: 26.19 KG/M2 | SYSTOLIC BLOOD PRESSURE: 122 MMHG

## 2023-11-20 DIAGNOSIS — E53.8 VITAMIN B12 DEFICIENCY: ICD-10-CM

## 2023-11-20 DIAGNOSIS — M54.40 BILATERAL LOW BACK PAIN WITH SCIATICA, SCIATICA LATERALITY UNSPECIFIED, UNSPECIFIED CHRONICITY: ICD-10-CM

## 2023-11-20 DIAGNOSIS — F41.1 GENERALIZED ANXIETY DISORDER: ICD-10-CM

## 2023-11-20 PROCEDURE — 99214 OFFICE O/P EST MOD 30 MIN: CPT | Performed by: INTERNAL MEDICINE

## 2023-11-20 RX ORDER — HYDROCODONE BITARTRATE AND ACETAMINOPHEN 10; 325 MG/1; MG/1
1 TABLET ORAL EVERY 8 HOURS PRN
Qty: 90 TABLET | Refills: 0 | Status: SHIPPED | OUTPATIENT
Start: 2023-11-20

## 2023-11-20 RX ORDER — DESVENLAFAXINE SUCCINATE 50 MG/1
50 TABLET, EXTENDED RELEASE ORAL DAILY
Qty: 30 TABLET | Refills: 5 | Status: SHIPPED | OUTPATIENT
Start: 2023-11-20

## 2023-11-20 RX ORDER — BUSPIRONE HYDROCHLORIDE 15 MG/1
15 TABLET ORAL 2 TIMES DAILY
Qty: 270 TABLET | Refills: 1 | Status: SHIPPED | OUTPATIENT
Start: 2023-11-20

## 2023-11-20 NOTE — PROGRESS NOTES
Back Pain    Subjective   Subha Patino is a 43 y.o. female is here today for follow-up.    History of Present Illness  Using pot for her anxiety and pain. Has been upfront about it from the beginning.  Did have some over the weekend.      Current Outpatient Medications:     aspirin 81 MG EC tablet, Take 1 tablet by mouth Daily., Disp: , Rfl:     busPIRone (BUSPAR) 15 MG tablet, Take 1 tablet by mouth 2 (Two) Times a Day., Disp: 270 tablet, Rfl: 1    cyclobenzaprine (FLEXERIL) 10 MG tablet, Take 1 tablet by mouth Daily., Disp: 30 tablet, Rfl: 4    HYDROcodone-acetaminophen (NORCO)  MG per tablet, Take 1 tablet by mouth Every 8 (Eight) Hours As Needed for Moderate Pain., Disp: 90 tablet, Rfl: 0    Naloxone HCl (NARCAN) 0.4 MG/ML injection, Inject 1 mL into the appropriate muscle as directed by prescriber As Needed for Opioid Reversal., Disp: 1 mL, Rfl: 0    nitrofurantoin (Macrodantin) 100 MG capsule, 1 PO prn intercourse, Disp: 20 capsule, Rfl: 1    pantoprazole (PROTONIX) 20 MG EC tablet, Take 1 tablet by mouth Daily., Disp: 90 tablet, Rfl: 1    propranolol (INDERAL) 10 MG tablet, Take 1-2 tablets by mouth 2 (Two) Times a Day As Needed (palps)., Disp: 180 tablet, Rfl: 1    spironolactone (ALDACTONE) 50 MG tablet, Take 2 tablets by mouth Daily As Needed (acne)., Disp: 180 tablet, Rfl: 3    Synthroid 100 MCG tablet, Take 1 tablet by mouth Daily., Disp: 30 tablet, Rfl: 11    Cyanocobalamin 1000 MCG sublingual tablet, Place 1 each under the tongue Daily., Disp: 90 each, Rfl: 1    desvenlafaxine (Pristiq) 50 MG 24 hr tablet, Take 1 tablet by mouth Daily., Disp: 30 tablet, Rfl: 5      The following portions of the patient's history were reviewed and updated as appropriate: allergies, current medications, past family history, past medical history, past social history, past surgical history and problem list.    Review of Systems   Constitutional: Negative.  Negative for chills and fever.   HENT:  Negative for ear  "discharge, ear pain, sinus pressure and sore throat.    Respiratory:  Negative for cough, chest tightness and shortness of breath.    Cardiovascular:  Negative for chest pain, palpitations and leg swelling.   Gastrointestinal:  Negative for diarrhea, nausea and vomiting.   Musculoskeletal:  Positive for arthralgias and back pain. Negative for myalgias.   Neurological:  Negative for dizziness, syncope and headaches.   Psychiatric/Behavioral:  Negative for confusion and sleep disturbance.        Objective   /74   Pulse 74   Temp 97.5 °F (36.4 °C)   Ht 165.1 cm (65\")   Wt 71.3 kg (157 lb 3.2 oz)   SpO2 100% Comment: ra  BMI 26.16 kg/m²   Physical Exam  Vitals and nursing note reviewed.   Constitutional:       Appearance: She is well-developed.   HENT:      Head: Normocephalic and atraumatic.      Right Ear: External ear normal.      Left Ear: External ear normal.      Mouth/Throat:      Pharynx: No oropharyngeal exudate.   Eyes:      Conjunctiva/sclera: Conjunctivae normal.      Pupils: Pupils are equal, round, and reactive to light.   Neck:      Thyroid: No thyromegaly.   Cardiovascular:      Rate and Rhythm: Normal rate and regular rhythm.      Pulses: Normal pulses.      Heart sounds: Normal heart sounds. No murmur heard.     No friction rub. No gallop.   Pulmonary:      Effort: Pulmonary effort is normal.      Breath sounds: Normal breath sounds.   Abdominal:      General: Bowel sounds are normal. There is no distension.      Palpations: Abdomen is soft.      Tenderness: There is no abdominal tenderness.   Musculoskeletal:         General: Tenderness present. No swelling.      Cervical back: Neck supple.   Skin:     General: Skin is warm and dry.   Neurological:      Mental Status: She is alert and oriented to person, place, and time.      Cranial Nerves: No cranial nerve deficit.   Psychiatric:         Judgment: Judgment normal.                 Results for orders placed or performed in visit on " 09/18/23   TSH Rfx On Abnormal To Free T4    Specimen: Blood   Result Value Ref Range    TSH 2.320 0.270 - 4.200 uIU/mL             Assessment & Plan   Diagnoses and all orders for this visit:    Bilateral low back pain with sciatica, sciatica laterality unspecified, unspecified chronicity  Comments:  well controlled on current regimen.  Orders:  -     HYDROcodone-acetaminophen (NORCO)  MG per tablet; Take 1 tablet by mouth Every 8 (Eight) Hours As Needed for Moderate Pain.    Generalized anxiety disorder  -     busPIRone (BUSPAR) 15 MG tablet; Take 1 tablet by mouth 2 (Two) Times a Day.  -     desvenlafaxine (Pristiq) 50 MG 24 hr tablet; Take 1 tablet by mouth Daily.    Vitamin B12 deficiency  -     Cyanocobalamin 1000 MCG sublingual tablet; Place 1 each under the tongue Daily.           The patient has read and signed the [facility] Controlled Substance Contract.  I will continue to see patient for regular follow up appointments.  They are well controlled on their medication.  MARJORIE has been reviewed by me and is updated every 3 months. The patient is aware of the potential for addiction and dependence.       Return for Recheck- 1/15/23 at 11.15 am- pls schedule.    Electronically signed by:    Catrina Todd MD

## 2023-12-22 DIAGNOSIS — K21.00 GASTROESOPHAGEAL REFLUX DISEASE WITH ESOPHAGITIS WITHOUT HEMORRHAGE: ICD-10-CM

## 2023-12-22 RX ORDER — PANTOPRAZOLE SODIUM 20 MG/1
20 TABLET, DELAYED RELEASE ORAL DAILY
Qty: 90 TABLET | Refills: 1 | Status: SHIPPED | OUTPATIENT
Start: 2023-12-22

## 2024-01-15 ENCOUNTER — OFFICE VISIT (OUTPATIENT)
Dept: INTERNAL MEDICINE | Facility: CLINIC | Age: 44
End: 2024-01-15
Payer: COMMERCIAL

## 2024-01-15 ENCOUNTER — OFFICE VISIT (OUTPATIENT)
Dept: ENDOCRINOLOGY | Facility: CLINIC | Age: 44
End: 2024-01-15
Payer: COMMERCIAL

## 2024-01-15 ENCOUNTER — LAB (OUTPATIENT)
Dept: LAB | Facility: HOSPITAL | Age: 44
End: 2024-01-15
Payer: COMMERCIAL

## 2024-01-15 ENCOUNTER — TELEPHONE (OUTPATIENT)
Dept: INTERNAL MEDICINE | Facility: CLINIC | Age: 44
End: 2024-01-15

## 2024-01-15 VITALS
BODY MASS INDEX: 26.33 KG/M2 | DIASTOLIC BLOOD PRESSURE: 76 MMHG | SYSTOLIC BLOOD PRESSURE: 128 MMHG | OXYGEN SATURATION: 98 % | WEIGHT: 158 LBS | HEART RATE: 69 BPM | HEIGHT: 65 IN

## 2024-01-15 VITALS
TEMPERATURE: 97.2 F | WEIGHT: 158 LBS | DIASTOLIC BLOOD PRESSURE: 74 MMHG | HEART RATE: 75 BPM | SYSTOLIC BLOOD PRESSURE: 116 MMHG | HEIGHT: 65 IN | OXYGEN SATURATION: 100 % | BODY MASS INDEX: 26.33 KG/M2

## 2024-01-15 DIAGNOSIS — G25.81 RLS (RESTLESS LEGS SYNDROME): Primary | ICD-10-CM

## 2024-01-15 DIAGNOSIS — M54.40 BILATERAL LOW BACK PAIN WITH SCIATICA, SCIATICA LATERALITY UNSPECIFIED, UNSPECIFIED CHRONICITY: ICD-10-CM

## 2024-01-15 DIAGNOSIS — R53.83 OTHER FATIGUE: ICD-10-CM

## 2024-01-15 DIAGNOSIS — N39.0 RECURRENT UTI: ICD-10-CM

## 2024-01-15 DIAGNOSIS — E03.9 ACQUIRED HYPOTHYROIDISM: Primary | Chronic | ICD-10-CM

## 2024-01-15 DIAGNOSIS — R79.89 HIGH SERUM FERRITIN: ICD-10-CM

## 2024-01-15 LAB
ANION GAP SERPL CALCULATED.3IONS-SCNC: 14 MMOL/L (ref 5–15)
BUN SERPL-MCNC: 15 MG/DL (ref 6–20)
BUN/CREAT SERPL: 16.9 (ref 7–25)
CALCIUM SPEC-SCNC: 9.3 MG/DL (ref 8.6–10.5)
CHLORIDE SERPL-SCNC: 103 MMOL/L (ref 98–107)
CO2 SERPL-SCNC: 22 MMOL/L (ref 22–29)
CREAT SERPL-MCNC: 0.89 MG/DL (ref 0.57–1)
EGFRCR SERPLBLD CKD-EPI 2021: 82.1 ML/MIN/1.73
FERRITIN SERPL-MCNC: 238 NG/ML (ref 13–150)
GLUCOSE SERPL-MCNC: 77 MG/DL (ref 65–99)
POTASSIUM SERPL-SCNC: 4.3 MMOL/L (ref 3.5–5.2)
SODIUM SERPL-SCNC: 139 MMOL/L (ref 136–145)
T4 FREE SERPL-MCNC: 1.64 NG/DL (ref 0.93–1.7)
TSH SERPL DL<=0.05 MIU/L-ACNC: 4.36 UIU/ML (ref 0.27–4.2)

## 2024-01-15 PROCEDURE — 84439 ASSAY OF FREE THYROXINE: CPT | Performed by: PHYSICIAN ASSISTANT

## 2024-01-15 PROCEDURE — 99214 OFFICE O/P EST MOD 30 MIN: CPT | Performed by: INTERNAL MEDICINE

## 2024-01-15 PROCEDURE — 84443 ASSAY THYROID STIM HORMONE: CPT | Performed by: PHYSICIAN ASSISTANT

## 2024-01-15 PROCEDURE — 99214 OFFICE O/P EST MOD 30 MIN: CPT | Performed by: PHYSICIAN ASSISTANT

## 2024-01-15 PROCEDURE — 82728 ASSAY OF FERRITIN: CPT | Performed by: PHYSICIAN ASSISTANT

## 2024-01-15 PROCEDURE — 80048 BASIC METABOLIC PNL TOTAL CA: CPT

## 2024-01-15 RX ORDER — NITROFURANTOIN MACROCRYSTALS 100 MG/1
CAPSULE ORAL
Qty: 20 CAPSULE | Refills: 1 | Status: SHIPPED | OUTPATIENT
Start: 2024-01-15

## 2024-01-15 RX ORDER — ROPINIROLE 0.5 MG/1
0.5 TABLET, FILM COATED ORAL NIGHTLY
Qty: 30 TABLET | Refills: 5 | Status: SHIPPED | OUTPATIENT
Start: 2024-01-15

## 2024-01-15 RX ORDER — HYDROCODONE BITARTRATE AND ACETAMINOPHEN 10; 325 MG/1; MG/1
1 TABLET ORAL EVERY 8 HOURS PRN
Qty: 90 TABLET | Refills: 0 | Status: SHIPPED | OUTPATIENT
Start: 2024-01-15 | End: 2024-01-15 | Stop reason: SDUPTHER

## 2024-01-15 RX ORDER — HYDROCODONE BITARTRATE AND ACETAMINOPHEN 10; 325 MG/1; MG/1
1 TABLET ORAL EVERY 8 HOURS PRN
Qty: 90 TABLET | Refills: 0 | Status: SHIPPED | OUTPATIENT
Start: 2024-01-15

## 2024-01-15 RX ORDER — CYCLOBENZAPRINE HCL 10 MG
10 TABLET ORAL DAILY
Qty: 30 TABLET | Refills: 4 | Status: SHIPPED | OUTPATIENT
Start: 2024-01-15

## 2024-01-15 NOTE — PROGRESS NOTES
Back Pain and Leg Pain (bilateral)    Subjective   Subha Patino is a 43 y.o. female is here today for follow-up.    History of Present Illness  Raynauds is acting up.Leg cramps are worse, sandra at night. Here for a f/u on her chronic back pain.  Discussed UDS results last visit.        Current Outpatient Medications:     aspirin 81 MG EC tablet, Take 1 tablet by mouth Daily., Disp: , Rfl:     busPIRone (BUSPAR) 15 MG tablet, Take 1 tablet by mouth 2 (Two) Times a Day., Disp: 270 tablet, Rfl: 1    Cyanocobalamin 1000 MCG sublingual tablet, Place 1 each under the tongue Daily., Disp: 90 each, Rfl: 1    cyclobenzaprine (FLEXERIL) 10 MG tablet, Take 1 tablet by mouth Daily., Disp: 30 tablet, Rfl: 4    Naloxone HCl (NARCAN) 0.4 MG/ML injection, Inject 1 mL into the appropriate muscle as directed by prescriber As Needed for Opioid Reversal., Disp: 1 mL, Rfl: 0    nitrofurantoin (Macrodantin) 100 MG capsule, 1 PO prn intercourse, Disp: 20 capsule, Rfl: 1    pantoprazole (PROTONIX) 20 MG EC tablet, TAKE 1 TABLET BY MOUTH EVERY DAY, Disp: 90 tablet, Rfl: 1    propranolol (INDERAL) 10 MG tablet, Take 1-2 tablets by mouth 2 (Two) Times a Day As Needed (palps)., Disp: 180 tablet, Rfl: 1    spironolactone (ALDACTONE) 50 MG tablet, Take 2 tablets by mouth Daily As Needed (acne)., Disp: 180 tablet, Rfl: 3    Synthroid 100 MCG tablet, Take 1 tablet by mouth Daily., Disp: 30 tablet, Rfl: 11    HYDROcodone-acetaminophen (NORCO)  MG per tablet, Take 1 tablet by mouth Every 8 (Eight) Hours As Needed for Moderate Pain., Disp: 90 tablet, Rfl: 0    rOPINIRole (REQUIP) 0.5 MG tablet, Take 1 tablet by mouth Every Night. Take 1 hour before bedtime., Disp: 30 tablet, Rfl: 5      The following portions of the patient's history were reviewed and updated as appropriate: allergies, current medications, past family history, past medical history, past social history, past surgical history and problem list.    Review of Systems  "  Constitutional: Negative.  Negative for chills and fever.   HENT:  Negative for ear discharge, ear pain, sinus pressure and sore throat.    Respiratory:  Negative for cough, chest tightness and shortness of breath.    Cardiovascular:  Negative for chest pain, palpitations and leg swelling.   Gastrointestinal:  Negative for diarrhea, nausea and vomiting.   Musculoskeletal:  Positive for back pain and myalgias. Negative for arthralgias.   Neurological:  Negative for dizziness, syncope and headaches.   Psychiatric/Behavioral:  Negative for confusion and sleep disturbance.        Objective   /74   Pulse 75   Temp 97.2 °F (36.2 °C)   Ht 165.1 cm (65\")   Wt 71.7 kg (158 lb)   SpO2 100% Comment: ra  BMI 26.29 kg/m²   Physical Exam  Vitals and nursing note reviewed.   Constitutional:       Appearance: She is well-developed.   HENT:      Head: Normocephalic and atraumatic.      Right Ear: External ear normal.      Left Ear: External ear normal.      Mouth/Throat:      Pharynx: No oropharyngeal exudate.   Eyes:      Conjunctiva/sclera: Conjunctivae normal.      Pupils: Pupils are equal, round, and reactive to light.   Neck:      Thyroid: No thyromegaly.   Cardiovascular:      Rate and Rhythm: Normal rate and regular rhythm.      Pulses: Normal pulses.      Heart sounds: Normal heart sounds. No murmur heard.     No friction rub. No gallop.   Pulmonary:      Effort: Pulmonary effort is normal.      Breath sounds: Normal breath sounds.   Abdominal:      General: Bowel sounds are normal. There is no distension.      Palpations: Abdomen is soft.      Tenderness: There is no abdominal tenderness.   Musculoskeletal:         General: Tenderness present.      Cervical back: Neck supple.   Skin:     General: Skin is warm and dry.   Neurological:      Mental Status: She is alert and oriented to person, place, and time.      Cranial Nerves: No cranial nerve deficit.   Psychiatric:         Judgment: Judgment normal.          "        Results for orders placed or performed in visit on 01/15/24   TSH    Specimen: Blood   Result Value Ref Range    TSH 4.360 (H) 0.270 - 4.200 uIU/mL   T4, Free    Specimen: Blood   Result Value Ref Range    Free T4 1.64 0.93 - 1.70 ng/dL   Ferritin    Specimen: Blood   Result Value Ref Range    Ferritin 238.00 (H) 13.00 - 150.00 ng/mL             Assessment & Plan   Diagnoses and all orders for this visit:    RLS (restless legs syndrome)  -     rOPINIRole (REQUIP) 0.5 MG tablet; Take 1 tablet by mouth Every Night. Take 1 hour before bedtime.    Bilateral low back pain with sciatica, sciatica laterality unspecified, unspecified chronicity  Comments:  well controlled on current regimen.  Orders:  -     cyclobenzaprine (FLEXERIL) 10 MG tablet; Take 1 tablet by mouth Daily.  -     Discontinue: HYDROcodone-acetaminophen (NORCO)  MG per tablet; Take 1 tablet by mouth Every 8 (Eight) Hours As Needed for Moderate Pain.  -     Urine Drug Screen - Urine, Clean Catch; Future  -     Urine Drug Screen - Urine, Clean Catch    Recurrent UTI  -     nitrofurantoin (Macrodantin) 100 MG capsule; 1 PO prn intercourse    Other fatigue  -     Basic Metabolic Panel; Future  -     Urine Drug Screen - Urine, Clean Catch             The patient has read and signed the [facility] Controlled Substance Contract.  I will continue to see patient for regular follow up appointments.  They are well controlled on their medication.  MARJORIE has been reviewed by me and is updated every 3 months. The patient is aware of the potential for addiction and dependence.     Return for Next scheduled follow up.    Electronically signed by:    Catrina Todd MD

## 2024-01-15 NOTE — TELEPHONE ENCOUNTER
PHARMACY IS CALLING BECAUSE THEY ARE UNABLE TO FILL HYDROCODONE. THERE WAS A CHANGE IN OWNERSHIP AND THEY'RE WAITING FOR THEIR RAVEN NUMBER TO UPDATE. PATIENT MAY NOT WANT TO WAIT TO HAVE THIS RX FILLED. PLEASE ADVISE.

## 2024-01-15 NOTE — PROGRESS NOTES
Chief complaint  Hypothyroidism    Subjective   Subha Patino is a 43 y.o. female is here today for follow-up.    Hypothyroidism:  Current treatment includes Synthroid 100 mcg. She takes the medication appropriately.   Previous meds: generic levothyroxine triggered palpitations, Tirosint d/c due to cost. Synthroid seem to work best for her.   U/s thyroid in 2013 - small multiple nodules with largest measuring 3 mm.     More tired recently. Not sure if related to thyroid or winter.   Synthroid dose lowered July 2023 due to tsh being slightly low 0.178.   Restless leg.   Skin always dry.   Ferritin was high last year. Was on iron supplement at the time but now off of it.   Lost weight then gained some of it back.     Medications    Current Outpatient Medications:     aspirin 81 MG EC tablet, Take 1 tablet by mouth Daily., Disp: , Rfl:     busPIRone (BUSPAR) 15 MG tablet, Take 1 tablet by mouth 2 (Two) Times a Day., Disp: 270 tablet, Rfl: 1    Cyanocobalamin 1000 MCG sublingual tablet, Place 1 each under the tongue Daily., Disp: 90 each, Rfl: 1    cyclobenzaprine (FLEXERIL) 10 MG tablet, Take 1 tablet by mouth Daily., Disp: 30 tablet, Rfl: 4    HYDROcodone-acetaminophen (NORCO)  MG per tablet, Take 1 tablet by mouth Every 8 (Eight) Hours As Needed for Moderate Pain., Disp: 90 tablet, Rfl: 0    Naloxone HCl (NARCAN) 0.4 MG/ML injection, Inject 1 mL into the appropriate muscle as directed by prescriber As Needed for Opioid Reversal., Disp: 1 mL, Rfl: 0    nitrofurantoin (Macrodantin) 100 MG capsule, 1 PO prn intercourse, Disp: 20 capsule, Rfl: 1    pantoprazole (PROTONIX) 20 MG EC tablet, TAKE 1 TABLET BY MOUTH EVERY DAY, Disp: 90 tablet, Rfl: 1    propranolol (INDERAL) 10 MG tablet, Take 1-2 tablets by mouth 2 (Two) Times a Day As Needed (palps)., Disp: 180 tablet, Rfl: 1    spironolactone (ALDACTONE) 50 MG tablet, Take 2 tablets by mouth Daily As Needed (acne)., Disp: 180 tablet, Rfl: 3    Synthroid 100 MCG  "tablet, Take 1 tablet by mouth Daily., Disp: 30 tablet, Rfl: 11      Review of systems  Review of Systems   Constitutional:  Positive for fatigue.       Physical exam  Objective   Blood pressure 128/76, pulse 69, height 165.1 cm (65\"), weight 71.7 kg (158 lb), SpO2 98%, not currently breastfeeding.   Physical Exam   Constitutional: She is oriented to person, place, and time. She appears well-developed.   HENT:   Head: Normocephalic and atraumatic.   Eyes: Conjunctivae are normal.   Neck: No thyromegaly present.   The neck is supple and no assimetry visualized   Cardiovascular: Normal rate, regular rhythm, normal heart sounds and normal pulses.   Pulmonary/Chest: Effort normal and breath sounds normal.   Musculoskeletal: No swelling.   Neurological: She is alert and oriented to person, place, and time.   Psychiatric: Thought content normal.   Vitals reviewed.        LABS AND IMAGING  Lab Results   Component Value Date    TSH 2.320 09/18/2023    [id number] 11.70 (H) 09/13/2018           Assessment  Assessment & Plan   Problems Addressed this Visit       Hypothyroidism - Primary    Relevant Orders    TSH    T4, Free     Other Visit Diagnoses       High serum ferritin        Relevant Orders    Ferritin          Diagnoses         Codes Comments    Acquired hypothyroidism    -  Primary ICD-10-CM: E03.9  ICD-9-CM: 244.9     High serum ferritin     ICD-10-CM: R79.89  ICD-9-CM: 790.99             Plan  Synthroid 100 mcg daily, repeat labs  Repeat ferritin    Follow-up in 12 months    Jayda Enamorado PA-C  Endocrinology  "

## 2024-01-16 DIAGNOSIS — E03.9 ACQUIRED HYPOTHYROIDISM: Primary | ICD-10-CM

## 2024-01-16 LAB
AMPHETAMINES UR QL SCN: NEGATIVE NG/ML
BARBITURATES UR QL SCN: NEGATIVE NG/ML
BENZODIAZ UR QL SCN: NEGATIVE NG/ML
BZE UR QL SCN: NEGATIVE NG/ML
CANNABINOIDS UR QL SCN: POSITIVE NG/ML
CREAT UR-MCNC: 136.7 MG/DL (ref 20–300)
LABORATORY COMMENT REPORT: ABNORMAL
METHADONE UR QL SCN: NEGATIVE NG/ML
OPIATES UR QL SCN: POSITIVE NG/ML
OXYCODONE+OXYMORPHONE UR QL SCN: NEGATIVE NG/ML
PCP UR QL: NEGATIVE NG/ML
PH UR: 6.3 [PH] (ref 4.5–8.9)
PROPOXYPH UR QL SCN: NEGATIVE NG/ML

## 2024-01-16 RX ORDER — LEVOTHYROXINE SODIUM 100 MCG
TABLET ORAL
Qty: 96 TABLET | Refills: 0 | Status: SHIPPED | OUTPATIENT
Start: 2024-01-16

## 2024-02-14 DIAGNOSIS — N39.0 RECURRENT UTI: ICD-10-CM

## 2024-02-14 DIAGNOSIS — L70.9 ACNE, UNSPECIFIED ACNE TYPE: ICD-10-CM

## 2024-02-14 DIAGNOSIS — G25.81 RLS (RESTLESS LEGS SYNDROME): ICD-10-CM

## 2024-02-14 DIAGNOSIS — K21.00 GASTROESOPHAGEAL REFLUX DISEASE WITH ESOPHAGITIS WITHOUT HEMORRHAGE: ICD-10-CM

## 2024-02-14 DIAGNOSIS — M54.40 BILATERAL LOW BACK PAIN WITH SCIATICA, SCIATICA LATERALITY UNSPECIFIED, UNSPECIFIED CHRONICITY: ICD-10-CM

## 2024-02-14 DIAGNOSIS — F41.1 GENERALIZED ANXIETY DISORDER: ICD-10-CM

## 2024-02-14 RX ORDER — SPIRONOLACTONE 50 MG/1
100 TABLET, FILM COATED ORAL DAILY PRN
Qty: 180 TABLET | Refills: 1 | Status: SHIPPED | OUTPATIENT
Start: 2024-02-14

## 2024-02-14 RX ORDER — ROPINIROLE 0.5 MG/1
0.5 TABLET, FILM COATED ORAL NIGHTLY
Qty: 90 TABLET | Refills: 1 | Status: SHIPPED | OUTPATIENT
Start: 2024-02-14

## 2024-02-14 RX ORDER — NITROFURANTOIN MACROCRYSTALS 100 MG/1
CAPSULE ORAL
Qty: 20 CAPSULE | Refills: 1 | Status: SHIPPED | OUTPATIENT
Start: 2024-02-14

## 2024-02-14 RX ORDER — BUSPIRONE HYDROCHLORIDE 15 MG/1
15 TABLET ORAL 2 TIMES DAILY
Qty: 270 TABLET | Refills: 1 | Status: CANCELLED | OUTPATIENT
Start: 2024-02-14

## 2024-02-14 RX ORDER — BUSPIRONE HYDROCHLORIDE 15 MG/1
15 TABLET ORAL 2 TIMES DAILY
Qty: 180 TABLET | Refills: 1 | Status: SHIPPED | OUTPATIENT
Start: 2024-02-14

## 2024-02-14 RX ORDER — HYDROCODONE BITARTRATE AND ACETAMINOPHEN 10; 325 MG/1; MG/1
1 TABLET ORAL EVERY 8 HOURS PRN
Qty: 90 TABLET | Refills: 0 | Status: SHIPPED | OUTPATIENT
Start: 2024-02-14

## 2024-02-14 RX ORDER — PANTOPRAZOLE SODIUM 20 MG/1
20 TABLET, DELAYED RELEASE ORAL DAILY
Qty: 90 TABLET | Refills: 1 | Status: CANCELLED | OUTPATIENT
Start: 2024-02-14

## 2024-03-11 ENCOUNTER — OFFICE VISIT (OUTPATIENT)
Dept: INTERNAL MEDICINE | Facility: CLINIC | Age: 44
End: 2024-03-11
Payer: COMMERCIAL

## 2024-03-11 VITALS
WEIGHT: 164 LBS | SYSTOLIC BLOOD PRESSURE: 122 MMHG | HEIGHT: 65 IN | DIASTOLIC BLOOD PRESSURE: 86 MMHG | BODY MASS INDEX: 27.32 KG/M2 | TEMPERATURE: 97.8 F | HEART RATE: 68 BPM | OXYGEN SATURATION: 99 %

## 2024-03-11 DIAGNOSIS — B35.1 ONYCHOMYCOSIS: ICD-10-CM

## 2024-03-11 DIAGNOSIS — H10.13 ALLERGIC CONJUNCTIVITIS OF BOTH EYES: ICD-10-CM

## 2024-03-11 DIAGNOSIS — F41.9 ANXIETY AND DEPRESSION: ICD-10-CM

## 2024-03-11 DIAGNOSIS — M54.40 BILATERAL LOW BACK PAIN WITH SCIATICA, SCIATICA LATERALITY UNSPECIFIED, UNSPECIFIED CHRONICITY: ICD-10-CM

## 2024-03-11 DIAGNOSIS — F32.A ANXIETY AND DEPRESSION: ICD-10-CM

## 2024-03-11 DIAGNOSIS — R09.81 NASAL CONGESTION: Primary | ICD-10-CM

## 2024-03-11 LAB
EXPIRATION DATE: NORMAL
FLUAV AG UPPER RESP QL IA.RAPID: NOT DETECTED
FLUBV AG UPPER RESP QL IA.RAPID: NOT DETECTED
INTERNAL CONTROL: NORMAL
Lab: NORMAL
SARS-COV-2 AG UPPER RESP QL IA.RAPID: NOT DETECTED

## 2024-03-11 PROCEDURE — 99214 OFFICE O/P EST MOD 30 MIN: CPT | Performed by: INTERNAL MEDICINE

## 2024-03-11 PROCEDURE — 87428 SARSCOV & INF VIR A&B AG IA: CPT | Performed by: INTERNAL MEDICINE

## 2024-03-11 RX ORDER — HYDROCODONE BITARTRATE AND ACETAMINOPHEN 10; 325 MG/1; MG/1
1 TABLET ORAL EVERY 8 HOURS PRN
Qty: 90 TABLET | Refills: 0 | Status: SHIPPED | OUTPATIENT
Start: 2024-03-11

## 2024-03-11 RX ORDER — AZELASTINE HYDROCHLORIDE 0.5 MG/ML
1 SOLUTION/ DROPS OPHTHALMIC 2 TIMES DAILY
Qty: 6 ML | Refills: 5 | Status: SHIPPED | OUTPATIENT
Start: 2024-03-11

## 2024-03-11 RX ORDER — DULOXETIN HYDROCHLORIDE 30 MG/1
30 CAPSULE, DELAYED RELEASE ORAL DAILY
Qty: 30 CAPSULE | Refills: 5 | Status: SHIPPED | OUTPATIENT
Start: 2024-03-11

## 2024-03-11 RX ORDER — CICLOPIROX 80 MG/ML
SOLUTION TOPICAL NIGHTLY
Qty: 6 ML | Refills: 5 | Status: SHIPPED | OUTPATIENT
Start: 2024-03-11

## 2024-03-11 NOTE — PROGRESS NOTES
Back Pain, Restless Legs Syndrome, Sinus Problem (Congestion with burning eyes-since Friday), and Anxiety    Subjective   Subha Patino is a 43 y.o. female is here today for follow-up.    Back Pain  Pertinent negatives include no chest pain, fever or headaches.   Sinus Problem  Associated symptoms include congestion. Pertinent negatives include no chills, coughing, ear pain, headaches, shortness of breath, sinus pressure or sore throat.   Anxiety  Symptoms include nervous/anxious behavior. Patient reports no chest pain, confusion, dizziness, nausea, palpitations or shortness of breath.         Taking requip regularly, better now.  Allergies are acting up, watery eyes, and burning nostril.   Rt 4th toenail- thicker.  Anxiety is worse, would like to get of the vapes.  Here for a f/u on her lbp, well controlled on meds.      Current Outpatient Medications:     aspirin 81 MG EC tablet, Take 1 tablet by mouth Daily., Disp: , Rfl:     busPIRone (BUSPAR) 15 MG tablet, Take 1 tablet by mouth 2 (Two) Times a Day., Disp: 180 tablet, Rfl: 1    Cyanocobalamin 1000 MCG sublingual tablet, Place 1 each under the tongue Daily., Disp: 90 each, Rfl: 1    cyclobenzaprine (FLEXERIL) 10 MG tablet, Take 1 tablet by mouth Daily., Disp: 30 tablet, Rfl: 4    HYDROcodone-acetaminophen (NORCO)  MG per tablet, Take 1 tablet by mouth Every 8 (Eight) Hours As Needed for Moderate Pain., Disp: 90 tablet, Rfl: 0    Naloxone HCl (NARCAN) 0.4 MG/ML injection, Inject 1 mL into the appropriate muscle as directed by prescriber As Needed for Opioid Reversal., Disp: 1 mL, Rfl: 0    nitrofurantoin (Macrodantin) 100 MG capsule, 1 PO prn intercourse, Disp: 20 capsule, Rfl: 1    pantoprazole (PROTONIX) 20 MG EC tablet, TAKE 1 TABLET BY MOUTH EVERY DAY, Disp: 90 tablet, Rfl: 1    propranolol (INDERAL) 10 MG tablet, Take 1-2 tablets by mouth 2 (Two) Times a Day As Needed (palps)., Disp: 180 tablet, Rfl: 1    rOPINIRole (REQUIP) 0.5 MG tablet, Take 1  "tablet by mouth Every Night. Take 1 hour before bedtime., Disp: 90 tablet, Rfl: 1    spironolactone (ALDACTONE) 50 MG tablet, Take 2 tablets by mouth Daily As Needed (acne)., Disp: 180 tablet, Rfl: 1    Synthroid 100 MCG tablet, Take 1 pill daily but add extra 1/2 pill once a week, Disp: 96 tablet, Rfl: 0    azelastine (OPTIVAR) 0.05 % ophthalmic solution, Administer 1 drop to both eyes 2 (Two) Times a Day., Disp: 6 mL, Rfl: 5    ciclopirox (PENLAC) 8 % solution, Apply  topically to the appropriate area as directed Every Night., Disp: 6 mL, Rfl: 5    DULoxetine (CYMBALTA) 30 MG capsule, Take 1 capsule by mouth Daily., Disp: 30 capsule, Rfl: 5      The following portions of the patient's history were reviewed and updated as appropriate: allergies, current medications, past family history, past medical history, past social history, past surgical history and problem list.    Review of Systems   Constitutional: Negative.  Negative for chills and fever.   HENT:  Positive for congestion and postnasal drip. Negative for ear discharge, ear pain, sinus pressure and sore throat.    Respiratory:  Negative for cough, chest tightness and shortness of breath.    Cardiovascular:  Negative for chest pain, palpitations and leg swelling.   Gastrointestinal:  Negative for diarrhea, nausea and vomiting.   Musculoskeletal:  Positive for back pain. Negative for arthralgias and myalgias.   Skin:         Nail color change   Neurological:  Negative for dizziness, syncope and headaches.   Psychiatric/Behavioral:  Negative for confusion and sleep disturbance. The patient is nervous/anxious.        Objective   /86   Pulse 68   Temp 97.8 °F (36.6 °C)   Ht 165.1 cm (65\")   Wt 74.4 kg (164 lb)   SpO2 99% Comment: ra  BMI 27.29 kg/m²   Physical Exam  Vitals and nursing note reviewed.   Constitutional:       Appearance: She is well-developed.   HENT:      Head: Normocephalic and atraumatic.      Right Ear: External ear normal. Tympanic " membrane is bulging.      Left Ear: External ear normal. Tympanic membrane is bulging.      Mouth/Throat:      Pharynx: Posterior oropharyngeal erythema present. No oropharyngeal exudate.      Tonsils: No tonsillar abscesses.   Eyes:      Conjunctiva/sclera: Conjunctivae normal.      Pupils: Pupils are equal, round, and reactive to light.   Neck:      Thyroid: No thyromegaly.   Cardiovascular:      Rate and Rhythm: Normal rate and regular rhythm.      Pulses: Normal pulses.      Heart sounds: Normal heart sounds. No murmur heard.     No friction rub. No gallop.   Pulmonary:      Effort: Pulmonary effort is normal.      Breath sounds: Normal breath sounds. No stridor.   Abdominal:      General: Bowel sounds are normal. There is no distension.      Palpations: Abdomen is soft.      Tenderness: There is no abdominal tenderness.   Musculoskeletal:      Cervical back: Normal range of motion and neck supple.   Lymphadenopathy:      Cervical: No cervical adenopathy.   Skin:     General: Skin is warm and dry.   Neurological:      Mental Status: She is alert and oriented to person, place, and time.      Cranial Nerves: No cranial nerve deficit.   Psychiatric:         Judgment: Judgment normal.                 Results for orders placed or performed in visit on 03/11/24   POCT SARS-CoV-2 Antigen SAUL + Flu    Specimen: Swab   Result Value Ref Range    SARS Antigen Not Detected Not Detected, Presumptive Negative    Influenza A Antigen SAUL Not Detected Not Detected    Influenza B Antigen SAUL Not Detected Not Detected    Internal Control Passed Passed    Lot Number [id number]     Expiration Date 02/15/2025              Assessment & Plan   Diagnoses and all orders for this visit:    Nasal congestion  -     POCT SARS-CoV-2 Antigen SAUL + Flu    Allergic conjunctivitis of both eyes  -     azelastine (OPTIVAR) 0.05 % ophthalmic solution; Administer 1 drop to both eyes 2 (Two) Times a Day.    Onychomycosis  -     ciclopirox (PENLAC) 8 %  solution; Apply  topically to the appropriate area as directed Every Night.    Bilateral low back pain with sciatica, sciatica laterality unspecified, unspecified chronicity  Comments:  well controlled on current regimen.  Orders:  -     HYDROcodone-acetaminophen (NORCO)  MG per tablet; Take 1 tablet by mouth Every 8 (Eight) Hours As Needed for Moderate Pain.    Anxiety and depression  -     DULoxetine (CYMBALTA) 30 MG capsule; Take 1 capsule by mouth Daily.    Counseled re; techniques to quit vaping.       The patient has read and signed the [facility] Controlled Substance Contract.  I will continue to see patient for regular follow up appointments.  They are well controlled on their medication.  MARJORIE has been reviewed by me and is updated every 3 months. The patient is aware of the potential for addiction and dependence.       Return in about 2 months (around 5/11/2024) for Next scheduled follow up.    Electronically signed by:    Catrina Todd MD

## 2024-03-27 DIAGNOSIS — F41.1 GENERALIZED ANXIETY DISORDER: ICD-10-CM

## 2024-03-27 RX ORDER — BUSPIRONE HYDROCHLORIDE 15 MG/1
15 TABLET ORAL 2 TIMES DAILY
Qty: 180 TABLET | Refills: 1 | OUTPATIENT
Start: 2024-03-27

## 2024-03-27 NOTE — TELEPHONE ENCOUNTER
Medication sent on 2/14/2024 quantity of 180 with 1 refill. Pharmacy should have refills on file.

## 2024-04-04 DIAGNOSIS — F32.A ANXIETY AND DEPRESSION: ICD-10-CM

## 2024-04-04 DIAGNOSIS — F41.9 ANXIETY AND DEPRESSION: ICD-10-CM

## 2024-04-04 RX ORDER — DULOXETIN HYDROCHLORIDE 30 MG/1
30 CAPSULE, DELAYED RELEASE ORAL DAILY
Qty: 90 CAPSULE | Refills: 1 | Status: SHIPPED | OUTPATIENT
Start: 2024-04-04

## 2024-04-15 DIAGNOSIS — M54.40 BILATERAL LOW BACK PAIN WITH SCIATICA, SCIATICA LATERALITY UNSPECIFIED, UNSPECIFIED CHRONICITY: ICD-10-CM

## 2024-04-15 RX ORDER — HYDROCODONE BITARTRATE AND ACETAMINOPHEN 10; 325 MG/1; MG/1
1 TABLET ORAL EVERY 8 HOURS PRN
Qty: 90 TABLET | Refills: 0 | Status: SHIPPED | OUTPATIENT
Start: 2024-04-15

## 2024-04-15 NOTE — TELEPHONE ENCOUNTER
Caller: Subha Patino    Relationship: Self    Best call back number: [phone]    Requested Prescriptions:   Requested Prescriptions     Pending Prescriptions Disp Refills    HYDROcodone-acetaminophen (NORCO)  MG per tablet 90 tablet 0     Sig: Take 1 tablet by mouth Every 8 (Eight) Hours As Needed for Moderate Pain.        Pharmacy where request should be sent: [id number] - [place], [place] - [address] AT NEXT TO [facility] - [phone] [facility] [phone] FX     Last office visit with prescribing clinician: 3/11/2024   Last telemedicine visit with prescribing clinician: Visit date not found   Next office visit with prescribing clinician: 5/6/2024     Additional details provided by patient: PT HAS ONE PILL LEFT    Does the patient have less than a 3 day supply:  [x] Yes  [] No    Would you like a call back once the refill request has been completed: [x] Yes [] No    If the office needs to give you a call back, can they leave a voicemail: [x] Yes [] No    Gissel Anne Rep   04/15/24 09:00 EDT

## 2024-05-06 ENCOUNTER — LAB (OUTPATIENT)
Dept: LAB | Facility: HOSPITAL | Age: 44
End: 2024-05-06
Payer: COMMERCIAL

## 2024-05-06 ENCOUNTER — OFFICE VISIT (OUTPATIENT)
Dept: INTERNAL MEDICINE | Facility: CLINIC | Age: 44
End: 2024-05-06
Payer: COMMERCIAL

## 2024-05-06 VITALS
HEART RATE: 75 BPM | DIASTOLIC BLOOD PRESSURE: 82 MMHG | WEIGHT: 165 LBS | OXYGEN SATURATION: 98 % | SYSTOLIC BLOOD PRESSURE: 118 MMHG | TEMPERATURE: 97.6 F | HEIGHT: 65 IN | BODY MASS INDEX: 27.49 KG/M2

## 2024-05-06 DIAGNOSIS — N92.6 IRREGULAR PERIODS: ICD-10-CM

## 2024-05-06 DIAGNOSIS — M54.40 BILATERAL LOW BACK PAIN WITH SCIATICA, SCIATICA LATERALITY UNSPECIFIED, UNSPECIFIED CHRONICITY: ICD-10-CM

## 2024-05-06 DIAGNOSIS — K21.00 GASTROESOPHAGEAL REFLUX DISEASE WITH ESOPHAGITIS WITHOUT HEMORRHAGE: ICD-10-CM

## 2024-05-06 DIAGNOSIS — L82.1 SK (SEBORRHEIC KERATOSIS): ICD-10-CM

## 2024-05-06 DIAGNOSIS — F41.1 GENERALIZED ANXIETY DISORDER: ICD-10-CM

## 2024-05-06 DIAGNOSIS — E03.9 ACQUIRED HYPOTHYROIDISM: ICD-10-CM

## 2024-05-06 DIAGNOSIS — F41.0 PANIC ATTACKS: ICD-10-CM

## 2024-05-06 DIAGNOSIS — N92.6 IRREGULAR PERIODS: Primary | ICD-10-CM

## 2024-05-06 LAB
FERRITIN SERPL-MCNC: 155 NG/ML (ref 13–150)
FSH SERPL-ACNC: 6.11 MIU/ML
IRON 24H UR-MRATE: 119 MCG/DL (ref 37–145)
IRON SATN MFR SERPL: 33 % (ref 20–50)
T4 FREE SERPL-MCNC: 1.65 NG/DL (ref 0.93–1.7)
TIBC SERPL-MCNC: 361 MCG/DL (ref 298–536)
TRANSFERRIN SERPL-MCNC: 242 MG/DL (ref 200–360)
TSH SERPL DL<=0.05 MIU/L-ACNC: 1.81 UIU/ML (ref 0.27–4.2)

## 2024-05-06 PROCEDURE — 84466 ASSAY OF TRANSFERRIN: CPT

## 2024-05-06 PROCEDURE — 83001 ASSAY OF GONADOTROPIN (FSH): CPT

## 2024-05-06 PROCEDURE — 83540 ASSAY OF IRON: CPT

## 2024-05-06 PROCEDURE — 99214 OFFICE O/P EST MOD 30 MIN: CPT | Performed by: INTERNAL MEDICINE

## 2024-05-06 PROCEDURE — 82728 ASSAY OF FERRITIN: CPT

## 2024-05-06 PROCEDURE — 84439 ASSAY OF FREE THYROXINE: CPT

## 2024-05-06 PROCEDURE — 84443 ASSAY THYROID STIM HORMONE: CPT

## 2024-05-06 RX ORDER — PANTOPRAZOLE SODIUM 20 MG/1
20 TABLET, DELAYED RELEASE ORAL DAILY
Qty: 90 TABLET | Refills: 1 | Status: SHIPPED | OUTPATIENT
Start: 2024-05-06

## 2024-05-06 RX ORDER — PROPRANOLOL HYDROCHLORIDE 10 MG/1
10-20 TABLET ORAL 2 TIMES DAILY PRN
Qty: 180 TABLET | Refills: 1 | Status: SHIPPED | OUTPATIENT
Start: 2024-05-06

## 2024-05-06 RX ORDER — HYDROCODONE BITARTRATE AND ACETAMINOPHEN 10; 325 MG/1; MG/1
1 TABLET ORAL EVERY 8 HOURS PRN
Qty: 90 TABLET | Refills: 0 | Status: SHIPPED | OUTPATIENT
Start: 2024-05-06

## 2024-06-12 DIAGNOSIS — M54.40 BILATERAL LOW BACK PAIN WITH SCIATICA, SCIATICA LATERALITY UNSPECIFIED, UNSPECIFIED CHRONICITY: ICD-10-CM

## 2024-06-12 RX ORDER — HYDROCODONE BITARTRATE AND ACETAMINOPHEN 10; 325 MG/1; MG/1
1 TABLET ORAL EVERY 8 HOURS PRN
Qty: 90 TABLET | Refills: 0 | Status: SHIPPED | OUTPATIENT
Start: 2024-06-12

## 2024-07-11 DIAGNOSIS — F41.0 PANIC ATTACKS: ICD-10-CM

## 2024-07-11 DIAGNOSIS — F41.1 GENERALIZED ANXIETY DISORDER: ICD-10-CM

## 2024-07-11 RX ORDER — PROPRANOLOL HYDROCHLORIDE 10 MG/1
10-20 TABLET ORAL 2 TIMES DAILY PRN
Qty: 180 TABLET | Refills: 1 | OUTPATIENT
Start: 2024-07-11

## 2024-07-23 ENCOUNTER — OFFICE VISIT (OUTPATIENT)
Dept: INTERNAL MEDICINE | Facility: CLINIC | Age: 44
End: 2024-07-23
Payer: COMMERCIAL

## 2024-07-23 VITALS
OXYGEN SATURATION: 97 % | BODY MASS INDEX: 27.46 KG/M2 | HEART RATE: 80 BPM | DIASTOLIC BLOOD PRESSURE: 82 MMHG | TEMPERATURE: 97.6 F | HEIGHT: 65 IN | SYSTOLIC BLOOD PRESSURE: 126 MMHG | WEIGHT: 164.8 LBS

## 2024-07-23 DIAGNOSIS — G25.81 RLS (RESTLESS LEGS SYNDROME): ICD-10-CM

## 2024-07-23 DIAGNOSIS — N39.0 RECURRENT UTI: ICD-10-CM

## 2024-07-23 DIAGNOSIS — L70.9 ACNE, UNSPECIFIED ACNE TYPE: ICD-10-CM

## 2024-07-23 DIAGNOSIS — F41.1 GENERALIZED ANXIETY DISORDER: ICD-10-CM

## 2024-07-23 DIAGNOSIS — M54.40 BILATERAL LOW BACK PAIN WITH SCIATICA, SCIATICA LATERALITY UNSPECIFIED, UNSPECIFIED CHRONICITY: ICD-10-CM

## 2024-07-23 DIAGNOSIS — L98.9 ARM LESION: Primary | ICD-10-CM

## 2024-07-23 PROCEDURE — 99214 OFFICE O/P EST MOD 30 MIN: CPT | Performed by: INTERNAL MEDICINE

## 2024-07-23 RX ORDER — CYCLOBENZAPRINE HCL 10 MG
10 TABLET ORAL DAILY
Qty: 30 TABLET | Refills: 4 | Status: SHIPPED | OUTPATIENT
Start: 2024-07-23

## 2024-07-23 RX ORDER — ROPINIROLE 0.5 MG/1
0.5 TABLET, FILM COATED ORAL NIGHTLY
Qty: 90 TABLET | Refills: 1 | Status: SHIPPED | OUTPATIENT
Start: 2024-07-23

## 2024-07-23 RX ORDER — SPIRONOLACTONE 50 MG/1
100 TABLET, FILM COATED ORAL DAILY PRN
Qty: 180 TABLET | Refills: 1 | Status: SHIPPED | OUTPATIENT
Start: 2024-07-23

## 2024-07-23 RX ORDER — HYDROCODONE BITARTRATE AND ACETAMINOPHEN 10; 325 MG/1; MG/1
1 TABLET ORAL EVERY 8 HOURS PRN
Qty: 90 TABLET | Refills: 0 | Status: SHIPPED | OUTPATIENT
Start: 2024-07-23

## 2024-07-23 RX ORDER — NITROFURANTOIN MACROCRYSTALS 100 MG/1
CAPSULE ORAL
Qty: 20 CAPSULE | Refills: 1 | Status: SHIPPED | OUTPATIENT
Start: 2024-07-23

## 2024-07-23 RX ORDER — BUSPIRONE HYDROCHLORIDE 15 MG/1
15 TABLET ORAL 2 TIMES DAILY
Qty: 180 TABLET | Refills: 1 | Status: SHIPPED | OUTPATIENT
Start: 2024-07-23

## 2024-07-23 NOTE — PROGRESS NOTES
Back Pain, Med Refill, and Nevus (On right arm for a month or 2)    Subjective   Subha Patino is a 44 y.o. female is here today for follow-up.    Back Pain  Pertinent negatives include no chest pain, fever or headaches.   Nevus  Pertinent negatives include no arthralgias, chest pain, chills, coughing, fever, headaches, myalgias, nausea, sore throat or vomiting.     Rt arm lesion x 2 mos, initially itched, now better, but not going away.    Current Outpatient Medications:     aspirin 81 MG EC tablet, Take 1 tablet by mouth Daily., Disp: , Rfl:     azelastine (OPTIVAR) 0.05 % ophthalmic solution, Administer 1 drop to both eyes 2 (Two) Times a Day., Disp: 6 mL, Rfl: 5    busPIRone (BUSPAR) 15 MG tablet, Take 1 tablet by mouth 2 (Two) Times a Day., Disp: 180 tablet, Rfl: 1    ciclopirox (PENLAC) 8 % solution, Apply  topically to the appropriate area as directed Every Night., Disp: 6 mL, Rfl: 5    cyclobenzaprine (FLEXERIL) 10 MG tablet, Take 1 tablet by mouth Daily., Disp: 30 tablet, Rfl: 4    DULoxetine (CYMBALTA) 30 MG capsule, Take 1 capsule by mouth Daily., Disp: 90 capsule, Rfl: 1    HYDROcodone-acetaminophen (NORCO)  MG per tablet, Take 1 tablet by mouth Every 8 (Eight) Hours As Needed for Moderate Pain., Disp: 90 tablet, Rfl: 0    Naloxone HCl (NARCAN) 0.4 MG/ML injection, Inject 1 mL into the appropriate muscle as directed by prescriber As Needed for Opioid Reversal., Disp: 1 mL, Rfl: 0    nitrofurantoin (Macrodantin) 100 MG capsule, 1 PO prn intercourse, Disp: 20 capsule, Rfl: 1    pantoprazole (PROTONIX) 20 MG EC tablet, Take 1 tablet by mouth Daily., Disp: 90 tablet, Rfl: 1    propranolol (INDERAL) 10 MG tablet, Take 1-2 tablets by mouth 2 (Two) Times a Day As Needed (palps)., Disp: 180 tablet, Rfl: 1    rOPINIRole (REQUIP) 0.5 MG tablet, Take 1 tablet by mouth Every Night. Take 1 hour before bedtime., Disp: 90 tablet, Rfl: 1    spironolactone (ALDACTONE) 50 MG tablet, Take 2 tablets by mouth Daily  "As Needed (acne)., Disp: 180 tablet, Rfl: 1    Synthroid 100 MCG tablet, Take 1 pill daily but add extra 1/2 pill once a week, Disp: 96 tablet, Rfl: 0    Cyanocobalamin 1000 MCG sublingual tablet, Place 1 each under the tongue Daily. (Patient not taking: Reported on 7/23/2024), Disp: 90 each, Rfl: 1      The following portions of the patient's history were reviewed and updated as appropriate: allergies, current medications, past family history, past medical history, past social history, past surgical history and problem list.    Review of Systems   Constitutional: Negative.  Negative for chills and fever.   HENT:  Negative for ear discharge, ear pain, sinus pressure and sore throat.    Respiratory:  Negative for cough, chest tightness and shortness of breath.    Cardiovascular:  Negative for chest pain, palpitations and leg swelling.   Gastrointestinal:  Negative for diarrhea, nausea and vomiting.   Musculoskeletal:  Positive for back pain. Negative for arthralgias and myalgias.   Skin:  Positive for color change.   Neurological:  Negative for dizziness, syncope and headaches.   Psychiatric/Behavioral:  Negative for confusion and sleep disturbance.        Objective   /82   Pulse 80   Temp 97.6 °F (36.4 °C)   Ht 165.1 cm (65\")   Wt 74.8 kg (164 lb 12.8 oz)   SpO2 97% Comment: ra  BMI 27.42 kg/m²   Physical Exam  Vitals and nursing note reviewed.   Constitutional:       Appearance: She is well-developed.   HENT:      Head: Normocephalic and atraumatic.      Right Ear: External ear normal.      Left Ear: External ear normal.      Mouth/Throat:      Pharynx: No oropharyngeal exudate.   Eyes:      Conjunctiva/sclera: Conjunctivae normal.      Pupils: Pupils are equal, round, and reactive to light.   Neck:      Thyroid: No thyromegaly.   Cardiovascular:      Rate and Rhythm: Normal rate and regular rhythm.      Pulses: Normal pulses.      Heart sounds: Normal heart sounds. No murmur heard.     No friction rub. " No gallop.   Pulmonary:      Effort: Pulmonary effort is normal.      Breath sounds: Normal breath sounds.   Abdominal:      General: Bowel sounds are normal. There is no distension.      Palpations: Abdomen is soft.      Tenderness: There is no abdominal tenderness.   Musculoskeletal:         General: Tenderness present.      Cervical back: Neck supple.   Skin:     General: Skin is warm and dry.      Findings: Lesion (rtupper arm, rough 1 cm area, slightly raised , flesh colored - ?  bcc) present.   Neurological:      Mental Status: She is alert and oriented to person, place, and time.      Cranial Nerves: No cranial nerve deficit.   Psychiatric:         Judgment: Judgment normal.                 Results for orders placed or performed in visit on 05/06/24   T4, Free    Specimen: Blood   Result Value Ref Range    Free T4 1.65 0.93 - 1.70 ng/dL   Ferritin    Specimen: Blood   Result Value Ref Range    Ferritin 155.00 (H) 13.00 - 150.00 ng/mL   Iron Profile    Specimen: Blood   Result Value Ref Range    Iron 119 37 - 145 mcg/dL    Iron Saturation (TSAT) 33 20 - 50 %    Transferrin 242 200 - 360 mg/dL    TIBC 361 298 - 536 mcg/dL   Follicle Stimulating Hormone    Specimen: Blood   Result Value Ref Range    FSH 6.11 mIU/mL   TSH Rfx On Abnormal To Free T4    Specimen: Blood   Result Value Ref Range    TSH 1.810 0.270 - 4.200 uIU/mL             Assessment & Plan   Diagnoses and all orders for this visit:    Arm lesion  -     Ambulatory Referral to Dermatology    Generalized anxiety disorder  -     busPIRone (BUSPAR) 15 MG tablet; Take 1 tablet by mouth 2 (Two) Times a Day.    Bilateral low back pain with sciatica, sciatica laterality unspecified, unspecified chronicity  Comments:  well controlled on current regimen.  Orders:  -     cyclobenzaprine (FLEXERIL) 10 MG tablet; Take 1 tablet by mouth Daily.  -     HYDROcodone-acetaminophen (NORCO)  MG per tablet; Take 1 tablet by mouth Every 8 (Eight) Hours As Needed for  Moderate Pain.    Recurrent UTI  -     nitrofurantoin (Macrodantin) 100 MG capsule; 1 PO prn intercourse    RLS (restless legs syndrome)  -     rOPINIRole (REQUIP) 0.5 MG tablet; Take 1 tablet by mouth Every Night. Take 1 hour before bedtime.    Acne, unspecified acne type  -     spironolactone (ALDACTONE) 50 MG tablet; Take 2 tablets by mouth Daily As Needed (acne).      The patient has read and signed the [facility] Controlled Substance Contract.  I will continue to see patient for regular follow up appointments.  They are well controlled on their medication.  MARJORIE has been reviewed by me and is updated every 3 months. The patient is aware of the potential for addiction and dependence.            Return in about 3 months (around 10/23/2024) for Annual- pls reschedule annual for 10/18 at 8.15 am. thanks.    Electronically signed by:    Catrina Todd MD

## 2024-09-08 DIAGNOSIS — M54.40 BILATERAL LOW BACK PAIN WITH SCIATICA, SCIATICA LATERALITY UNSPECIFIED, UNSPECIFIED CHRONICITY: ICD-10-CM

## 2024-09-09 RX ORDER — HYDROCODONE BITARTRATE AND ACETAMINOPHEN 10; 325 MG/1; MG/1
1 TABLET ORAL EVERY 8 HOURS PRN
Qty: 90 TABLET | Refills: 0 | Status: SHIPPED | OUTPATIENT
Start: 2024-09-09

## 2024-09-09 NOTE — TELEPHONE ENCOUNTER
Last appointment: 5/6/2024  Next appointment: 10/18/2024     UDS:  CSC: 11/20/23    Last Refill: 7/23/2024 90 0 rf

## 2024-09-10 ENCOUNTER — PRIOR AUTHORIZATION (OUTPATIENT)
Dept: INTERNAL MEDICINE | Facility: CLINIC | Age: 44
End: 2024-09-10
Payer: COMMERCIAL

## 2024-10-07 DIAGNOSIS — F41.9 ANXIETY AND DEPRESSION: ICD-10-CM

## 2024-10-07 DIAGNOSIS — F32.A ANXIETY AND DEPRESSION: ICD-10-CM

## 2024-10-07 RX ORDER — DULOXETIN HYDROCHLORIDE 30 MG/1
30 CAPSULE, DELAYED RELEASE ORAL DAILY
Qty: 90 CAPSULE | Refills: 1 | Status: SHIPPED | OUTPATIENT
Start: 2024-10-07

## 2024-10-18 ENCOUNTER — OFFICE VISIT (OUTPATIENT)
Dept: INTERNAL MEDICINE | Facility: CLINIC | Age: 44
End: 2024-10-18
Payer: COMMERCIAL

## 2024-10-18 ENCOUNTER — LAB (OUTPATIENT)
Dept: LAB | Facility: HOSPITAL | Age: 44
End: 2024-10-18
Payer: COMMERCIAL

## 2024-10-18 VITALS
WEIGHT: 168.2 LBS | TEMPERATURE: 96.8 F | HEIGHT: 65 IN | SYSTOLIC BLOOD PRESSURE: 122 MMHG | OXYGEN SATURATION: 96 % | BODY MASS INDEX: 28.02 KG/M2 | DIASTOLIC BLOOD PRESSURE: 84 MMHG

## 2024-10-18 DIAGNOSIS — E55.9 VITAMIN D DEFICIENCY: ICD-10-CM

## 2024-10-18 DIAGNOSIS — R45.3 APATHY: ICD-10-CM

## 2024-10-18 DIAGNOSIS — Z00.00 ROUTINE GENERAL MEDICAL EXAMINATION AT A HEALTH CARE FACILITY: Primary | ICD-10-CM

## 2024-10-18 DIAGNOSIS — N92.0 MENORRHAGIA WITH REGULAR CYCLE: ICD-10-CM

## 2024-10-18 DIAGNOSIS — F32.A ANXIETY AND DEPRESSION: ICD-10-CM

## 2024-10-18 DIAGNOSIS — Z51.81 ENCOUNTER FOR THERAPEUTIC DRUG MONITORING: ICD-10-CM

## 2024-10-18 DIAGNOSIS — S01.302A OPEN WOUND OF LEFT EAR, UNSPECIFIED OPEN WOUND TYPE, INITIAL ENCOUNTER: ICD-10-CM

## 2024-10-18 DIAGNOSIS — Z72.0 TOBACCO USE: ICD-10-CM

## 2024-10-18 DIAGNOSIS — M54.40 BILATERAL LOW BACK PAIN WITH SCIATICA, SCIATICA LATERALITY UNSPECIFIED, UNSPECIFIED CHRONICITY: ICD-10-CM

## 2024-10-18 DIAGNOSIS — Z00.00 ROUTINE GENERAL MEDICAL EXAMINATION AT A HEALTH CARE FACILITY: ICD-10-CM

## 2024-10-18 DIAGNOSIS — F41.9 ANXIETY AND DEPRESSION: ICD-10-CM

## 2024-10-18 DIAGNOSIS — M18.11 ARTHRITIS OF CARPOMETACARPAL (CMC) JOINT OF RIGHT THUMB: ICD-10-CM

## 2024-10-18 LAB
25(OH)D3 SERPL-MCNC: 62.1 NG/ML (ref 30–100)
ALBUMIN SERPL-MCNC: 4.7 G/DL (ref 3.5–5.2)
ALBUMIN/GLOB SERPL: 1.7 G/DL
ALP SERPL-CCNC: 55 U/L (ref 39–117)
ALT SERPL W P-5'-P-CCNC: 20 U/L (ref 1–33)
ANION GAP SERPL CALCULATED.3IONS-SCNC: 10.4 MMOL/L (ref 5–15)
AST SERPL-CCNC: 23 U/L (ref 1–32)
BILIRUB BLD-MCNC: NEGATIVE MG/DL
BILIRUB SERPL-MCNC: 0.5 MG/DL (ref 0–1.2)
BUN SERPL-MCNC: 12 MG/DL (ref 6–20)
BUN/CREAT SERPL: 12.6 (ref 7–25)
CALCIUM SPEC-SCNC: 9.5 MG/DL (ref 8.6–10.5)
CHLORIDE SERPL-SCNC: 101 MMOL/L (ref 98–107)
CHOLEST SERPL-MCNC: 194 MG/DL (ref 0–200)
CLARITY, POC: CLEAR
CO2 SERPL-SCNC: 26.6 MMOL/L (ref 22–29)
COLOR UR: YELLOW
CREAT SERPL-MCNC: 0.95 MG/DL (ref 0.57–1)
DEPRECATED RDW RBC AUTO: 39.5 FL (ref 37–54)
EGFRCR SERPLBLD CKD-EPI 2021: 75.5 ML/MIN/1.73
ERYTHROCYTE [DISTWIDTH] IN BLOOD BY AUTOMATED COUNT: 11.1 % (ref 12.3–15.4)
EXPIRATION DATE: ABNORMAL
GLOBULIN UR ELPH-MCNC: 2.7 GM/DL
GLUCOSE SERPL-MCNC: 110 MG/DL (ref 65–99)
GLUCOSE UR STRIP-MCNC: NEGATIVE MG/DL
HBA1C MFR BLD: 4.9 % (ref 4.8–5.6)
HCT VFR BLD AUTO: 42.2 % (ref 34–46.6)
HDLC SERPL-MCNC: 88 MG/DL (ref 40–60)
HGB BLD-MCNC: 14.5 G/DL (ref 12–15.9)
IRON 24H UR-MRATE: 97 MCG/DL (ref 37–145)
IRON SATN MFR SERPL: 25 % (ref 20–50)
KETONES UR QL: NEGATIVE
LDLC SERPL CALC-MCNC: 93 MG/DL (ref 0–100)
LDLC/HDLC SERPL: 1.04 {RATIO}
LEUKOCYTE EST, POC: NEGATIVE
Lab: ABNORMAL
MCH RBC QN AUTO: 33.3 PG (ref 26.6–33)
MCHC RBC AUTO-ENTMCNC: 34.4 G/DL (ref 31.5–35.7)
MCV RBC AUTO: 97 FL (ref 79–97)
NITRITE UR-MCNC: NEGATIVE MG/ML
PH UR: 6 [PH] (ref 5–8)
PLATELET # BLD AUTO: 280 10*3/MM3 (ref 140–450)
PMV BLD AUTO: 10.9 FL (ref 6–12)
POTASSIUM SERPL-SCNC: 4.4 MMOL/L (ref 3.5–5.2)
PROT SERPL-MCNC: 7.4 G/DL (ref 6–8.5)
PROT UR STRIP-MCNC: ABNORMAL MG/DL
RBC # BLD AUTO: 4.35 10*6/MM3 (ref 3.77–5.28)
RBC # UR STRIP: NEGATIVE /UL
SODIUM SERPL-SCNC: 138 MMOL/L (ref 136–145)
SP GR UR: 1.02 (ref 1–1.03)
TIBC SERPL-MCNC: 393 MCG/DL (ref 298–536)
TRANSFERRIN SERPL-MCNC: 264 MG/DL (ref 200–360)
TRIGL SERPL-MCNC: 71 MG/DL (ref 0–150)
TSH SERPL DL<=0.05 MIU/L-ACNC: 3.44 UIU/ML (ref 0.27–4.2)
UROBILINOGEN UR QL: NORMAL
VIT B12 BLD-MCNC: 521 PG/ML (ref 211–946)
VLDLC SERPL-MCNC: 13 MG/DL (ref 5–40)
WBC NRBC COR # BLD AUTO: 8.6 10*3/MM3 (ref 3.4–10.8)

## 2024-10-18 PROCEDURE — 83036 HEMOGLOBIN GLYCOSYLATED A1C: CPT

## 2024-10-18 PROCEDURE — 82306 VITAMIN D 25 HYDROXY: CPT

## 2024-10-18 PROCEDURE — 80050 GENERAL HEALTH PANEL: CPT

## 2024-10-18 PROCEDURE — 81003 URINALYSIS AUTO W/O SCOPE: CPT | Performed by: INTERNAL MEDICINE

## 2024-10-18 PROCEDURE — 82607 VITAMIN B-12: CPT

## 2024-10-18 PROCEDURE — 80061 LIPID PANEL: CPT

## 2024-10-18 PROCEDURE — 84466 ASSAY OF TRANSFERRIN: CPT

## 2024-10-18 PROCEDURE — 99396 PREV VISIT EST AGE 40-64: CPT | Performed by: INTERNAL MEDICINE

## 2024-10-18 PROCEDURE — 99406 BEHAV CHNG SMOKING 3-10 MIN: CPT | Performed by: INTERNAL MEDICINE

## 2024-10-18 PROCEDURE — 83540 ASSAY OF IRON: CPT

## 2024-10-18 RX ORDER — HYDROCODONE BITARTRATE AND ACETAMINOPHEN 10; 325 MG/1; MG/1
1 TABLET ORAL EVERY 8 HOURS PRN
Qty: 90 TABLET | Refills: 0 | Status: SHIPPED | OUTPATIENT
Start: 2024-10-18

## 2024-10-18 RX ORDER — DULOXETIN HYDROCHLORIDE 20 MG/1
20 CAPSULE, DELAYED RELEASE ORAL DAILY
Qty: 90 CAPSULE | Refills: 1 | Status: SHIPPED | OUTPATIENT
Start: 2024-10-18

## 2024-10-18 RX ORDER — MUPIROCIN 20 MG/G
1 OINTMENT TOPICAL 3 TIMES DAILY
Qty: 22 G | Refills: 0 | Status: SHIPPED | OUTPATIENT
Start: 2024-10-18

## 2024-10-18 NOTE — PROGRESS NOTES
Chief Complaint   Patient presents with    Annual Exam       History of Present Illness  HM, Adult Female:  The patient is being seen for a health maintenance and gynecology evaluation. The last health maintenance visit was 1 year(s) ago.   Social History: She is  with 2 childre.. Work status:full time- Small business owner- [employer]  She continues to vape. She reports never drinking alcohol. Denies any illicit drug use.  General Health: The patient's health is described as good. She has regular dental visits. She denies vision problems. She denies hearing loss. Immunizations status: up to date.   Lifestyle:. She consumes a diverse and healthy diet. She does not have any weight concerns. She exercises regularly. She denies tobacco currently. She denies alcohol use. She denies drug use.   Reproductive health:. She reports normal menses.periods are every 2 weeks .  Screening: Cancer screening reviewed and current. Sister and Mom with breast ca. Sister just completed chemo and surgery.  Metabolic screening reviewed and current.   Risk screening reviewed and current. .  .      HPI  X 2 weeks left ear bump, tender.  Also having more apathy, does not feel motivated to do anything. Asking if could be hypothyroid or the Cymbalta, and if needs to lower the dose of her Cymbalta.  She does complain of increased pain in her right thumb.  Reports after a long workday the pain gets significantly worse.    Review of Systems   Constitutional:  Negative for chills and fatigue.   HENT:  Negative for congestion, ear pain and sore throat.    Eyes:  Negative for pain, redness and visual disturbance.   Respiratory:  Negative for cough and shortness of breath.    Cardiovascular:  Negative for chest pain, palpitations and leg swelling.   Gastrointestinal:  Negative for abdominal pain, diarrhea and nausea.   Endocrine: Negative for cold intolerance and heat intolerance.   Genitourinary:  Negative for flank pain and urgency.    Musculoskeletal:  Positive for arthralgias. Negative for gait problem.   Skin:  Positive for color change (ear- left sore) and wound. Negative for pallor and rash.   Neurological:  Negative for dizziness, weakness and headaches.   Psychiatric/Behavioral:  Negative for dysphoric mood and sleep disturbance. The patient is not nervous/anxious.        Patient Active Problem List   Diagnosis    Low back pain    Reaction to chronic stress    Hypothyroidism    Actinic keratosis    Allergic rhinitis    Carpal tunnel syndrome    Dyslipidemia    Hyperglycemia    Nontoxic single thyroid nodule    Vitamin B12 deficiency    Vitamin D deficiency    Lump or mass in breast    History of tobacco use    Panic attacks    PFO (patent foramen ovale)       Social History     Socioeconomic History    Marital status:    Tobacco Use    Smoking status: Former     Current packs/day: 0.00     Average packs/day: 0.8 packs/day for 20.0 years (15.0 ttl pk-yrs)     Types: Cigarettes     Start date: 10/4/2000     Quit date: 10/4/2020     Years since quittin.0    Smokeless tobacco: Never   Vaping Use    Vaping status: Some Days   Substance and Sexual Activity    Alcohol use: Yes     Comment: Beer or wine on occasion    Drug use: No    Sexual activity: Not Currently       Current Outpatient Medications on File Prior to Visit   Medication Sig Dispense Refill    aspirin 81 MG EC tablet Take 1 tablet by mouth Daily.      busPIRone (BUSPAR) 15 MG tablet Take 1 tablet by mouth 2 (Two) Times a Day. 180 tablet 1    ciclopirox (PENLAC) 8 % solution Apply  topically to the appropriate area as directed Every Night. 6 mL 5    Cyanocobalamin 1000 MCG sublingual tablet Place 1 each under the tongue Daily. 90 each 1    nitrofurantoin (Macrodantin) 100 MG capsule 1 PO prn intercourse 20 capsule 1    pantoprazole (PROTONIX) 20 MG EC tablet Take 1 tablet by mouth Daily. 90 tablet 1    propranolol (INDERAL) 10 MG tablet Take 1-2 tablets by mouth 2 (Two)  "Times a Day As Needed (palps). 180 tablet 1    spironolactone (ALDACTONE) 50 MG tablet Take 2 tablets by mouth Daily As Needed (acne). 180 tablet 1    Synthroid 100 MCG tablet Take 1 pill daily but add extra 1/2 pill once a week 96 tablet 0    Naloxone HCl (NARCAN) 0.4 MG/ML injection Inject 1 mL into the appropriate muscle as directed by prescriber As Needed for Opioid Reversal. (Patient not taking: Reported on 10/18/2024) 1 mL 0     No current facility-administered medications on file prior to visit.       Allergies   Allergen Reactions    Robitussin (Alcohol Free) [Guaifenesin] Other (See Comments)     Tachycardia       /84 (BP Location: Left arm, Patient Position: Sitting, Cuff Size: Adult)   Temp 96.8 °F (36 °C) (Temporal)   Ht 164 cm (64.57\")   Wt 76.3 kg (168 lb 3.2 oz)   SpO2 96%   BMI 28.37 kg/m²            The following portions of the patient's history were reviewed and updated as appropriate: allergies, current medications, past family history, past medical history, past social history, past surgical history, and problem list.    Physical Exam  Constitutional:       General: She is not in acute distress.     Appearance: Normal appearance.   HENT:      Head: Normocephalic and atraumatic.      Right Ear: Tympanic membrane and external ear normal.      Left Ear: Tympanic membrane and external ear normal.      Nose: Nose normal.      Mouth/Throat:      Mouth: Mucous membranes are moist.   Eyes:      General: No scleral icterus.  Neck:      Vascular: No carotid bruit.   Cardiovascular:      Rate and Rhythm: Normal rate and regular rhythm.      Pulses: Normal pulses.      Heart sounds: Normal heart sounds. No murmur heard.     No friction rub. No gallop.   Pulmonary:      Effort: Pulmonary effort is normal.      Breath sounds: Normal breath sounds. No rhonchi or rales.   Abdominal:      General: Bowel sounds are normal. There is no distension.      Palpations: Abdomen is soft.      Tenderness: There " is no right CVA tenderness, left CVA tenderness, guarding or rebound.      Hernia: No hernia is present.   Musculoskeletal:         General: No tenderness. Normal range of motion.      Cervical back: Normal range of motion.      Right lower leg: No edema.      Left lower leg: No edema.   Lymphadenopathy:      Cervical: No cervical adenopathy.   Skin:     General: Skin is warm.      Findings: No rash.   Neurological:      General: No focal deficit present.      Mental Status: She is alert and oriented to person, place, and time. Mental status is at baseline.      Cranial Nerves: No cranial nerve deficit.      Sensory: No sensory deficit.      Coordination: Coordination normal.      Gait: Gait normal.      Deep Tendon Reflexes: Reflexes normal.      Comments: Balance wnl   Psychiatric:         Mood and Affect: Mood normal.         Behavior: Behavior normal.         BMI is >= 25 and <30. (Overweight) The following options were offered after discussion;: exercise counseling/recommendations and nutrition counseling/recommendations       Results for orders placed or performed in visit on 10/18/24   POCT urinalysis dipstick, automated    Collection Time: 10/18/24  8:30 AM    Specimen: Urine   Result Value Ref Range    Color Yellow Yellow, Straw, Dark Yellow, Tabby    Clarity, UA Clear Clear    Specific Gravity  1.025 1.005 - 1.030    pH, Urine 6.0 5.0 - 8.0    Leukocytes Negative Negative    Nitrite, UA Negative Negative    Protein, POC Trace (A) Negative mg/dL    Glucose, UA Negative Negative mg/dL    Ketones, UA Negative Negative    Urobilinogen, UA Normal Normal, 0.2 E.U./dL    Bilirubin Negative Negative    Blood, UA Negative Negative    Lot Number [id number]     Expiration Date 11/08/2025        Diagnoses and all orders for this visit:    1. Routine general medical examination at a health care facility (Primary)  -     POCT urinalysis dipstick, automated  -     DULoxetine (CYMBALTA) 20 MG capsule; Take 1 capsule  by mouth Daily.  Dispense: 90 capsule; Refill: 1  -     Cologuard - Stool, Per Rectum; Future  -     CBC (No Diff); Future  -     Comprehensive Metabolic Panel; Future  -     Lipid Panel; Future  -     Hemoglobin A1c; Future  -     TSH Rfx On Abnormal To Free T4; Future  -     Vitamin B12; Future    2. Anxiety and depression  -     DULoxetine (CYMBALTA) 20 MG capsule; Take 1 capsule by mouth Daily.  Dispense: 90 capsule; Refill: 1    3. Tobacco use  -     nicotine (Nicoderm CQ) 7 MG/24HR patch; Place 1 patch on the skin as directed by provider Daily.  Dispense: 30 each; Refill: 1    4. Apathy  -     TSH Rfx On Abnormal To Free T4; Future    5. Vitamin D deficiency  -     Vitamin D,25-Hydroxy; Future    6. Menorrhagia with regular cycle  -     Iron Profile; Future    7. Bilateral low back pain with sciatica, sciatica laterality unspecified, unspecified chronicity  Comments:  well controlled on current regimen.  Orders:  -     HYDROcodone-acetaminophen (NORCO)  MG per tablet; Take 1 tablet by mouth Every 8 (Eight) Hours As Needed for Moderate Pain.  Dispense: 90 tablet; Refill: 0  -     Compliance Drug Analysis, Ur - Urine, Clean Catch; Future    8. Open wound of left ear, unspecified open wound type, initial encounter  -     mupirocin (BACTROBAN) 2 % ointment; Apply 1 Application topically to the appropriate area as directed 3 (Three) Times a Day. To wound area until healed  Dispense: 22 g; Refill: 0    9. Encounter for therapeutic drug monitoring  -     Compliance Drug Analysis, Ur - Urine, Clean Catch; Future    10. Arthritis of carpometacarpal (CMC) joint of right thumb  Comments:  Topical Voltaren gel, splint and exercises.  Call if worse, will need hand surgery referral.      The patient has read and signed the [facility] Controlled Substance Contract.  I will continue to see patient for regular follow up appointments.  They are well controlled on their medication.  MARJORIE has been reviewed by me and  is updated every 3 months. The patient is aware of the potential for addiction and dependence.      Health Maintenance   Topic Date Due    COLORECTAL CANCER SCREENING  Never done    INFLUENZA VACCINE  03/31/2025 (Originally 8/1/2024)    PAP SMEAR  07/18/2025    MAMMOGRAM  08/23/2025    ANNUAL PHYSICAL  10/18/2025    BMI FOLLOWUP  10/18/2025    TDAP/TD VACCINES (3 - Td or Tdap) 09/18/2033    HEPATITIS C SCREENING  Completed    Pneumococcal Vaccine 0-64  Aged Out    COVID-19 Vaccine  Discontinued       Discussion/Summary  Impression: health maintenance visit. Currently, she eats an adequate diet and has an adequate exercise regimen.   Cervical cancer screening:Pap smear is current.   Breast cancer screening: mammogram is current.   Colorectal cancer screening: colonoscopy is current.  Osteoporosis screening: Bone mineral density test is not indicated.   CT low dose screen -will be due at 54  Screening lab work includes hemoglobin, glucose, lipid profile, thyroid function testing, 25-hydroxyvitamin D and urinalysis.   Immunizations are needed, declines all vaccines.  Advice and education were given regarding cardiovascular risk reduction, healthy dietary habits, Seatbelt and helmet use and self skin examination.     Return in about 2 months (around 12/18/2024) for Recheck.      Electronically signed by:    Catrina Todd MD

## 2024-10-21 LAB
AMPHETAMINES UR QL SCN: NEGATIVE NG/ML
BARBITURATES UR QL SCN: NEGATIVE NG/ML
BENZODIAZ UR QL SCN: NEGATIVE NG/ML
BZE UR QL SCN: NEGATIVE NG/ML
CANNABINOIDS UR QL SCN: POSITIVE NG/ML
CREAT UR-MCNC: 254.4 MG/DL (ref 20–300)
LABORATORY COMMENT REPORT: ABNORMAL
METHADONE UR QL SCN: NEGATIVE NG/ML
OPIATES UR QL SCN: POSITIVE NG/ML
OXYCODONE+OXYMORPHONE UR QL SCN: NEGATIVE NG/ML
PCP UR QL: NEGATIVE NG/ML
PH UR: 5.7 [PH] (ref 4.5–8.9)
PROPOXYPH UR QL SCN: NEGATIVE NG/ML

## 2024-10-30 ENCOUNTER — TELEPHONE (OUTPATIENT)
Dept: INTERNAL MEDICINE | Facility: CLINIC | Age: 44
End: 2024-10-30
Payer: COMMERCIAL

## 2024-10-30 NOTE — TELEPHONE ENCOUNTER
Caller: WILDER    Relationship: Other [employer]    Best call back number:[phone]   CASE: [id number]    What was the call regarding: UNABLE TO GET COLOGUARD KIT TO THE PATIENT DUE TO THE ICD CODE PROVIDED ON THE ORDER.

## 2024-11-20 DIAGNOSIS — M54.40 BILATERAL LOW BACK PAIN WITH SCIATICA, SCIATICA LATERALITY UNSPECIFIED, UNSPECIFIED CHRONICITY: ICD-10-CM

## 2024-11-20 RX ORDER — HYDROCODONE BITARTRATE AND ACETAMINOPHEN 10; 325 MG/1; MG/1
1 TABLET ORAL EVERY 8 HOURS PRN
Qty: 90 TABLET | Refills: 0 | Status: SHIPPED | OUTPATIENT
Start: 2024-11-20

## 2024-11-20 NOTE — TELEPHONE ENCOUNTER
Last appointment: 10/18/2024  Next appointment: 1/6/2025     Last Refill: 10/18/2024 quantity of 90 with 0 refills

## 2024-12-03 DIAGNOSIS — E03.9 ACQUIRED HYPOTHYROIDISM: ICD-10-CM

## 2024-12-03 RX ORDER — LEVOTHYROXINE SODIUM 100 MCG
TABLET ORAL
Qty: 96 TABLET | Refills: 0 | Status: SHIPPED | OUTPATIENT
Start: 2024-12-03

## 2024-12-03 NOTE — TELEPHONE ENCOUNTER
Patient called requesting refill on Synthroid.  Last office visit 01/15/2024.  Follow up scheduled for 01/20/2025.

## 2025-01-06 ENCOUNTER — TELEMEDICINE (OUTPATIENT)
Dept: INTERNAL MEDICINE | Facility: CLINIC | Age: 45
End: 2025-01-06
Payer: COMMERCIAL

## 2025-01-06 DIAGNOSIS — M54.40 BILATERAL LOW BACK PAIN WITH SCIATICA, SCIATICA LATERALITY UNSPECIFIED, UNSPECIFIED CHRONICITY: ICD-10-CM

## 2025-01-06 DIAGNOSIS — R23.2 HOT FLASHES: ICD-10-CM

## 2025-01-06 DIAGNOSIS — F41.9 ANXIETY AND DEPRESSION: ICD-10-CM

## 2025-01-06 DIAGNOSIS — F32.A ANXIETY AND DEPRESSION: ICD-10-CM

## 2025-01-06 DIAGNOSIS — M54.40 BILATERAL LOW BACK PAIN WITH SCIATICA, SCIATICA LATERALITY UNSPECIFIED, UNSPECIFIED CHRONICITY: Primary | ICD-10-CM

## 2025-01-06 PROCEDURE — 99214 OFFICE O/P EST MOD 30 MIN: CPT | Performed by: INTERNAL MEDICINE

## 2025-01-06 RX ORDER — DULOXETIN HYDROCHLORIDE 20 MG/1
20 CAPSULE, DELAYED RELEASE ORAL DAILY
Qty: 90 CAPSULE | Refills: 1 | Status: SHIPPED | OUTPATIENT
Start: 2025-01-06

## 2025-01-06 RX ORDER — HYDROCODONE BITARTRATE AND ACETAMINOPHEN 10; 325 MG/1; MG/1
1 TABLET ORAL EVERY 8 HOURS PRN
Qty: 90 TABLET | Refills: 0 | Status: SHIPPED | OUTPATIENT
Start: 2025-01-06

## 2025-01-06 NOTE — PROGRESS NOTES
Med Refill    Subjective   Subharoque Patino is a 44 y.o. female is here today for follow-up.  This provider is located at the [facility] (through [facility]), [address] [place] [phone] using a secure [employer] Video Visit through [employer]. Patient is being seen remotely via telehealth at their home address in [place], and stated they are in a secure environment for this session. The patient's condition being diagnosed/treated is appropriate for telemedicine. The provider identified herself as well as her credentials. The patient, and/or patients guardian, consent to be seen remotely, and when consent is given they understand that the consent allows for patient identifiable information to be sent to a third party as needed. They may refuse to be seen remotely at any time. The electronic data is encrypted and password protected, and the patient and/or guardian has been advised of the potential risks to privacy not withstanding such measures.     You have chosen to receive care through a telehealth visit. Do you consent to use a video/audio connection for your medical care today? Yes   History of Present Illness  lbp is stable.  Concerned about the depo as she used to be on it for 20 years.  Has seen commercials lawsuits where it can cause brain tumors.  Some am nausea, after she takes her Synthroid.  Increased hot flashes.  Also having headaches.  Thinks it is from changing her Cymbalta from taking it in the morning to taking it at night.  Takes it before she eats.        Current Outpatient Medications:     DULoxetine (CYMBALTA) 20 MG capsule, Take 1 capsule by mouth Daily. With food, Disp: 90 capsule, Rfl: 1    HYDROcodone-acetaminophen (NORCO)  MG per tablet, Take 1 tablet by mouth Every 8 (Eight) Hours As Needed for Moderate Pain., Disp: 90 tablet, Rfl: 0    aspirin 81 MG EC tablet, Take 1 tablet by mouth Daily., Disp: , Rfl:     busPIRone (BUSPAR) 15 MG tablet, Take  1 tablet by mouth 2 (Two) Times a Day., Disp: 180 tablet, Rfl: 1    ciclopirox (PENLAC) 8 % solution, Apply  topically to the appropriate area as directed Every Night., Disp: 6 mL, Rfl: 5    Cyanocobalamin 1000 MCG sublingual tablet, Place 1 each under the tongue Daily., Disp: 90 each, Rfl: 1    mupirocin (BACTROBAN) 2 % ointment, Apply 1 Application topically to the appropriate area as directed 3 (Three) Times a Day. To wound area until healed, Disp: 22 g, Rfl: 0    Naloxone HCl (NARCAN) 0.4 MG/ML injection, Inject 1 mL into the appropriate muscle as directed by prescriber As Needed for Opioid Reversal. (Patient not taking: Reported on 10/18/2024), Disp: 1 mL, Rfl: 0    nicotine (Nicoderm CQ) 7 MG/24HR patch, Place 1 patch on the skin as directed by provider Daily., Disp: 30 each, Rfl: 1    nitrofurantoin (Macrodantin) 100 MG capsule, 1 PO prn intercourse, Disp: 20 capsule, Rfl: 1    pantoprazole (PROTONIX) 20 MG EC tablet, Take 1 tablet by mouth Daily., Disp: 90 tablet, Rfl: 1    propranolol (INDERAL) 10 MG tablet, Take 1-2 tablets by mouth 2 (Two) Times a Day As Needed (palps)., Disp: 180 tablet, Rfl: 1    spironolactone (ALDACTONE) 50 MG tablet, Take 2 tablets by mouth Daily As Needed (acne)., Disp: 180 tablet, Rfl: 1    Synthroid 100 MCG tablet, Take 1 pill daily but add extra 1/2 pill once a week, Disp: 96 tablet, Rfl: 0      The following portions of the patient's history were reviewed and updated as appropriate: allergies, current medications, past family history, past medical history, past social history, past surgical history and problem list.    Review of Systems   Constitutional: Negative.  Negative for chills and fever.   HENT:  Negative for ear discharge, ear pain, sinus pressure and sore throat.    Respiratory:  Negative for cough, chest tightness and shortness of breath.    Cardiovascular:  Negative for chest pain, palpitations and leg swelling.   Gastrointestinal:  Positive for nausea. Negative for  diarrhea and vomiting.   Endocrine: Positive for heat intolerance.   Musculoskeletal:  Positive for back pain. Negative for arthralgias and myalgias.   Neurological:  Positive for headaches. Negative for dizziness and syncope.   Psychiatric/Behavioral:  Negative for confusion and sleep disturbance.        Objective   There were no vitals taken for this visit.  Physical Exam  Constitutional:       Appearance: She is well-developed.   HENT:      Head: Normocephalic and atraumatic.   Eyes:      General: No scleral icterus.     Pupils: Pupils are equal, round, and reactive to light.   Pulmonary:      Effort: Pulmonary effort is normal. No respiratory distress.   Musculoskeletal:      Cervical back: Normal range of motion and neck supple.   Neurological:      Mental Status: She is alert and oriented to person, place, and time.   Psychiatric:         Judgment: Judgment normal.             Results for orders placed or performed in visit on 10/18/24   Urine Drug Screen -    Collection Time: 10/18/24  3:17 PM   Result Value Ref Range    Amphetamine, Urine Qual Negative Usqjtd=6598 ng/mL    Barbiturates Screen, Urine Negative Yilnlp=214 ng/mL    Benzodiazepine Screen, Urine Negative Vdpyvx=050 ng/mL    THC Screen, Urine Positive (A) Cutoff=20 ng/mL    Cocaine Screen, Urine Negative Moldhw=323 ng/mL    Opiate Screen, Urine Positive (A) Nawgbf=078 ng/mL    Oxycodone/Oxymorphone, Urine Negative Fxpiiy=364 ng/mL    Phencyclidine (PCP), Urine Negative Cutoff=25 ng/mL    Methadone Screen, Urine Negative Fvwjyb=960 ng/mL    Propoxyphene Screen Negative Rfcgbe=643 ng/mL    Creatinine, Urine 254.4 20.0 - 300.0 mg/dL    pH, UA 5.7 4.5 - 8.9    Please note Comment              Assessment & Plan   Diagnoses and all orders for this visit:    Bilateral low back pain with sciatica, sciatica laterality unspecified, unspecified chronicity  Comments:  well controlled on current regimen.refilled med.  Orders:  -     HYDROcodone-acetaminophen  (NORCO)  MG per tablet; Take 1 tablet by mouth Every 8 (Eight) Hours As Needed for Moderate Pain.    Bilateral low back pain with sciatica, sciatica laterality unspecified, unspecified chronicity  Comments:  Advised to change Cymbalta to after protein rich dinner.  Monitor side effects.  Orders:  -     HYDROcodone-acetaminophen (NORCO)  MG per tablet; Take 1 tablet by mouth Every 8 (Eight) Hours As Needed for Moderate Pain.    Hot flashes  Comments:  increase fluids and start otc Vitamin E.    Anxiety and depression  -     DULoxetine (CYMBALTA) 20 MG capsule; Take 1 capsule by mouth Daily. With food    Reassured patient on the Depo shots.  Has been around for a long time and advised to just monitor for any abnormal symptoms.  Advised to start taking vitamin E for her hot flashes.  Also to take her Cymbalta after a protein rich meal.  Likely causing her headaches and her a.m. nausea.       The patient has read and signed the [facility] Controlled Substance Contract.  I will continue to see patient for regular follow up appointments.  They are well controlled on their medication.  MARJORIE has been reviewed by me and is updated every 3 months. The patient is aware of the potential for addiction and dependence.       Return in about 2 months (around 3/6/2025) for Recheck.    Electronically signed by:    Catrina Todd MD

## 2025-01-16 DIAGNOSIS — L70.9 ACNE, UNSPECIFIED ACNE TYPE: ICD-10-CM

## 2025-01-16 RX ORDER — SPIRONOLACTONE 50 MG/1
100 TABLET, FILM COATED ORAL DAILY PRN
Qty: 180 TABLET | Refills: 1 | Status: SHIPPED | OUTPATIENT
Start: 2025-01-16

## 2025-02-11 DIAGNOSIS — M54.40 BILATERAL LOW BACK PAIN WITH SCIATICA, SCIATICA LATERALITY UNSPECIFIED, UNSPECIFIED CHRONICITY: ICD-10-CM

## 2025-02-11 RX ORDER — HYDROCODONE BITARTRATE AND ACETAMINOPHEN 10; 325 MG/1; MG/1
1 TABLET ORAL EVERY 8 HOURS PRN
Qty: 90 TABLET | Refills: 0 | Status: SHIPPED | OUTPATIENT
Start: 2025-02-11

## 2025-02-11 NOTE — TELEPHONE ENCOUNTER
Last appointment: 1/6/2025  Next appointment: 3/10/2025     UDS: 10/18/2024  CSC:     Last Refill: 1/6/2025 quantity of 90 with 0 refills

## 2025-03-03 DIAGNOSIS — E03.9 ACQUIRED HYPOTHYROIDISM: ICD-10-CM

## 2025-03-03 RX ORDER — LEVOTHYROXINE SODIUM 100 MCG
TABLET ORAL
Qty: 96 TABLET | Refills: 0 | OUTPATIENT
Start: 2025-03-03

## 2025-03-10 ENCOUNTER — OFFICE VISIT (OUTPATIENT)
Dept: INTERNAL MEDICINE | Facility: CLINIC | Age: 45
End: 2025-03-10
Payer: COMMERCIAL

## 2025-03-10 VITALS
OXYGEN SATURATION: 97 % | BODY MASS INDEX: 27.49 KG/M2 | DIASTOLIC BLOOD PRESSURE: 72 MMHG | WEIGHT: 165 LBS | SYSTOLIC BLOOD PRESSURE: 126 MMHG | TEMPERATURE: 99.4 F | HEART RATE: 64 BPM | HEIGHT: 65 IN

## 2025-03-10 DIAGNOSIS — K21.00 GASTROESOPHAGEAL REFLUX DISEASE WITH ESOPHAGITIS WITHOUT HEMORRHAGE: ICD-10-CM

## 2025-03-10 DIAGNOSIS — N39.0 RECURRENT UTI: ICD-10-CM

## 2025-03-10 DIAGNOSIS — M79.641 PAIN OF RIGHT HAND: Primary | ICD-10-CM

## 2025-03-10 DIAGNOSIS — F41.1 GENERALIZED ANXIETY DISORDER: ICD-10-CM

## 2025-03-10 DIAGNOSIS — M54.40 BILATERAL LOW BACK PAIN WITH SCIATICA, SCIATICA LATERALITY UNSPECIFIED, UNSPECIFIED CHRONICITY: ICD-10-CM

## 2025-03-10 PROCEDURE — 99214 OFFICE O/P EST MOD 30 MIN: CPT | Performed by: INTERNAL MEDICINE

## 2025-03-10 RX ORDER — NITROFURANTOIN MACROCRYSTALS 100 MG/1
CAPSULE ORAL
Qty: 20 CAPSULE | Refills: 1 | Status: SHIPPED | OUTPATIENT
Start: 2025-03-10

## 2025-03-10 RX ORDER — HYDROCODONE BITARTRATE AND ACETAMINOPHEN 10; 325 MG/1; MG/1
1 TABLET ORAL EVERY 8 HOURS PRN
Qty: 90 TABLET | Refills: 0 | Status: SHIPPED | OUTPATIENT
Start: 2025-03-10

## 2025-03-10 RX ORDER — BUSPIRONE HYDROCHLORIDE 15 MG/1
15 TABLET ORAL 2 TIMES DAILY
Qty: 180 TABLET | Refills: 1 | Status: SHIPPED | OUTPATIENT
Start: 2025-03-10

## 2025-03-10 RX ORDER — PANTOPRAZOLE SODIUM 20 MG/1
20 TABLET, DELAYED RELEASE ORAL DAILY
Qty: 90 TABLET | Refills: 1 | Status: SHIPPED | OUTPATIENT
Start: 2025-03-10

## 2025-03-10 NOTE — PROGRESS NOTES
Back Pain, Anxiety, Depression, and Hand Pain (Right thumb)    Subjective   Subha Patino is a 44 y.o. female is here today for follow-up.    History of Present Illness  Rt hand [pain, thinks she will need surgery as mom and sis have had it. Seen [facility]  For a follow-up on her chronic back pain.  She is doing well on her current regimen.      Current Outpatient Medications:     aspirin 81 MG EC tablet, Take 1 tablet by mouth Daily., Disp: , Rfl:     busPIRone (BUSPAR) 15 MG tablet, Take 1 tablet by mouth 2 (Two) Times a Day., Disp: 180 tablet, Rfl: 1    DULoxetine (CYMBALTA) 20 MG capsule, Take 1 capsule by mouth Daily. With food, Disp: 90 capsule, Rfl: 1    HYDROcodone-acetaminophen (NORCO)  MG per tablet, Take 1 tablet by mouth Every 8 (Eight) Hours As Needed for Moderate Pain., Disp: 90 tablet, Rfl: 0    Naloxone HCl (NARCAN) 0.4 MG/ML injection, Inject 1 mL into the appropriate muscle as directed by prescriber As Needed for Opioid Reversal., Disp: 1 mL, Rfl: 0    nitrofurantoin (Macrodantin) 100 MG capsule, 1 PO prn intercourse, Disp: 20 capsule, Rfl: 1    pantoprazole (PROTONIX) 20 MG EC tablet, Take 1 tablet by mouth Daily., Disp: 90 tablet, Rfl: 1    propranolol (INDERAL) 10 MG tablet, Take 1-2 tablets by mouth 2 (Two) Times a Day As Needed (palps)., Disp: 180 tablet, Rfl: 1    spironolactone (ALDACTONE) 50 MG tablet, TAKE 2 TABLETS BY MOUTH DAILY AS NEEDED (ACNE)., Disp: 180 tablet, Rfl: 1    Synthroid 100 MCG tablet, Take 1 pill daily but add extra 1/2 pill once a week, Disp: 96 tablet, Rfl: 0    Cyanocobalamin 1000 MCG sublingual tablet, Place 1 each under the tongue Daily., Disp: 90 each, Rfl: 1    mupirocin (BACTROBAN) 2 % ointment, Apply 1 Application topically to the appropriate area as directed 3 (Three) Times a Day. To wound area until healed, Disp: 22 g, Rfl: 0    nicotine (Nicoderm CQ) 7 MG/24HR patch, Place 1 patch on the skin as directed by provider Daily. (Patient not taking: Reported on  "3/10/2025), Disp: 30 each, Rfl: 1      The following portions of the patient's history were reviewed and updated as appropriate: allergies, current medications, past family history, past medical history, past social history, past surgical history and problem list.    Review of Systems   Constitutional: Negative.  Negative for chills and fever.   HENT:  Negative for ear discharge, ear pain, sinus pressure and sore throat.    Respiratory:  Negative for cough, chest tightness and shortness of breath.    Cardiovascular:  Negative for chest pain, palpitations and leg swelling.   Gastrointestinal:  Negative for diarrhea, nausea and vomiting.   Musculoskeletal:  Positive for arthralgias (hand pain), back pain and myalgias.   Neurological:  Negative for dizziness, syncope and headaches.   Psychiatric/Behavioral:  Negative for confusion and sleep disturbance.        Objective   /72   Pulse 64   Temp 99.4 °F (37.4 °C)   Ht 164 cm (64.57\")   Wt 74.8 kg (165 lb)   SpO2 97% Comment: ra  BMI 27.82 kg/m²   Physical Exam  Vitals and nursing note reviewed.   Constitutional:       Appearance: She is well-developed.   HENT:      Head: Normocephalic and atraumatic.      Right Ear: External ear normal.      Left Ear: External ear normal.      Mouth/Throat:      Pharynx: No oropharyngeal exudate.   Eyes:      Conjunctiva/sclera: Conjunctivae normal.      Pupils: Pupils are equal, round, and reactive to light.   Neck:      Thyroid: No thyromegaly.   Cardiovascular:      Rate and Rhythm: Normal rate and regular rhythm.      Pulses: Normal pulses.      Heart sounds: Normal heart sounds. No murmur heard.     No friction rub. No gallop.   Pulmonary:      Effort: Pulmonary effort is normal.      Breath sounds: Normal breath sounds.   Abdominal:      General: Bowel sounds are normal. There is no distension.      Palpations: Abdomen is soft.      Tenderness: There is no abdominal tenderness.   Musculoskeletal:      Cervical back: " Neck supple.   Skin:     General: Skin is warm and dry.   Neurological:      Mental Status: She is alert and oriented to person, place, and time.      Cranial Nerves: No cranial nerve deficit.   Psychiatric:         Judgment: Judgment normal.                 Results for orders placed or performed in visit on 10/18/24   Urine Drug Screen -    Collection Time: 10/18/24  3:17 PM   Result Value Ref Range    Amphetamine, Urine Qual Negative Snaiit=2953 ng/mL    Barbiturates Screen, Urine Negative Lypjwu=491 ng/mL    Benzodiazepine Screen, Urine Negative Ibdrfh=267 ng/mL    THC Screen, Urine Positive (A) Cutoff=20 ng/mL    Cocaine Screen, Urine Negative Bafobn=853 ng/mL    Opiate Screen, Urine Positive (A) Nwfwls=657 ng/mL    Oxycodone/Oxymorphone, Urine Negative Dwspnw=345 ng/mL    Phencyclidine (PCP), Urine Negative Cutoff=25 ng/mL    Methadone Screen, Urine Negative Wlaqtu=596 ng/mL    Propoxyphene Screen Negative Hjekel=339 ng/mL    Creatinine, Urine 254.4 20.0 - 300.0 mg/dL    pH, UA 5.7 4.5 - 8.9    Please note Comment              Assessment & Plan   Diagnoses and all orders for this visit:    Pain of right hand  -     Ambulatory Referral to Hand Surgery    Gastroesophageal reflux disease with esophagitis without hemorrhage  -     pantoprazole (PROTONIX) 20 MG EC tablet; Take 1 tablet by mouth Daily.    Bilateral low back pain with sciatica, sciatica laterality unspecified, unspecified chronicity  Comments:  well controlled on current regimen.refilled med.  Orders:  -     HYDROcodone-acetaminophen (NORCO)  MG per tablet; Take 1 tablet by mouth Every 8 (Eight) Hours As Needed for Moderate Pain.    Generalized anxiety disorder  -     busPIRone (BUSPAR) 15 MG tablet; Take 1 tablet by mouth 2 (Two) Times a Day.    Recurrent UTI  -     nitrofurantoin (Macrodantin) 100 MG capsule; 1 PO prn intercourse           The patient has read and signed the [facility] Controlled Substance Contract.  I will continue to see  patient for regular follow up appointments.  They are well controlled on their medication.  MARJORIE has been reviewed by me and is updated every 3 months. The patient is aware of the potential for addiction and dependence.       Return in about 2 months (around 5/10/2025) for Recheck and physical with pap after 10/18. thanks.    Electronically signed by:    Catrina Todd MD

## 2025-03-14 ENCOUNTER — TELEPHONE (OUTPATIENT)
Dept: INTERNAL MEDICINE | Facility: CLINIC | Age: 45
End: 2025-03-14

## 2025-03-14 ENCOUNTER — PRIOR AUTHORIZATION (OUTPATIENT)
Dept: INTERNAL MEDICINE | Facility: CLINIC | Age: 45
End: 2025-03-14
Payer: COMMERCIAL

## 2025-03-14 ENCOUNTER — PRIOR AUTHORIZATION (OUTPATIENT)
Dept: ENDOCRINOLOGY | Facility: CLINIC | Age: 45
End: 2025-03-14
Payer: COMMERCIAL

## 2025-03-14 DIAGNOSIS — E03.9 ACQUIRED HYPOTHYROIDISM: ICD-10-CM

## 2025-03-14 RX ORDER — LEVOTHYROXINE SODIUM 100 MCG
TABLET ORAL
Qty: 96 TABLET | Refills: 0 | Status: SHIPPED | OUTPATIENT
Start: 2025-03-14

## 2025-03-14 NOTE — TELEPHONE ENCOUNTER
Caller: Subha Patino    Relationship: Self    Best call back number: [phone]     What is the best time to reach you: ANYTIME     Who are you requesting to speak with (clinical staff, provider,  specific staff member): CLINICAL STAFF    What was the call regarding: PATIENT STATES THAT SHE IS HAVING TROUBLE WITH THE PHARMACY ABOUT GETTING HER HYDROCODONE PRESCRIPTION. SHE SAYS THAT AT FIRST THEY TOLD HER THAT THEY NEEDED A PRIOR AUTH, NOW THEY SAYS THAT THEY HAVE A QUESTION. THE PHARMACY WOULD NOT TELL HER WHAT QUESTION THAT THEY HAVE. ALL THEY WOULD TELL HER WAS THAT THE PROVIDER NEEDED TO CALL THEM. THE PATIENT STATES THAT THE PHARMACY WAS VERY RUDE TO HER TO THE POINT THAT SHE WAS IN TEARS WHILE SHE CALLED IN.     Is it okay if the provider responds through MyChart: YES

## 2025-03-14 NOTE — TELEPHONE ENCOUNTER
Last office visit with prescribing clinician: 1-15-24      Next office visit with prescribing clinician: 4-25-25

## 2025-03-14 NOTE — TELEPHONE ENCOUNTER
PA started on CMM for Subha Patino (Key: [id number])  PA Case ID #: [id number]  Need Help? Call us at [phone]  Outcome  Approved today by [employer] 2017  PA Case: [id number], Status: Approved, Coverage Starts on: 3/14/2025 12:00:00 AM, Coverage Ends on: 3/14/2026 12:00:00 AM.  Effective Date: 3/14/2025  Authorization Expiration Date: 3/14/2026  Drug  Synthroid 100MCG tablets  ePA cloud logo  Form  [employer] Electronic PA Form (2017 [employer])    Pt notified

## 2025-04-15 DIAGNOSIS — M54.40 BILATERAL LOW BACK PAIN WITH SCIATICA, SCIATICA LATERALITY UNSPECIFIED, UNSPECIFIED CHRONICITY: ICD-10-CM

## 2025-04-15 RX ORDER — HYDROCODONE BITARTRATE AND ACETAMINOPHEN 10; 325 MG/1; MG/1
1 TABLET ORAL EVERY 8 HOURS PRN
Qty: 90 TABLET | Refills: 0 | Status: SHIPPED | OUTPATIENT
Start: 2025-04-15

## 2025-04-25 ENCOUNTER — OFFICE VISIT (OUTPATIENT)
Dept: ENDOCRINOLOGY | Facility: CLINIC | Age: 45
End: 2025-04-25
Payer: COMMERCIAL

## 2025-04-25 VITALS
HEIGHT: 64 IN | WEIGHT: 168 LBS | DIASTOLIC BLOOD PRESSURE: 78 MMHG | SYSTOLIC BLOOD PRESSURE: 122 MMHG | BODY MASS INDEX: 28.68 KG/M2 | HEART RATE: 81 BPM | OXYGEN SATURATION: 98 %

## 2025-04-25 DIAGNOSIS — E03.9 ACQUIRED HYPOTHYROIDISM: Primary | ICD-10-CM

## 2025-04-25 DIAGNOSIS — E04.2 MULTIPLE THYROID NODULES: ICD-10-CM

## 2025-04-25 LAB — TSH SERPL DL<=0.05 MIU/L-ACNC: 0.27 UIU/ML (ref 0.27–4.2)

## 2025-04-25 PROCEDURE — 84443 ASSAY THYROID STIM HORMONE: CPT | Performed by: INTERNAL MEDICINE

## 2025-04-25 RX ORDER — LEVOTHYROXINE SODIUM 100 MCG
TABLET ORAL
Qty: 90 TABLET | Refills: 3 | Status: SHIPPED | OUTPATIENT
Start: 2025-04-25 | End: 2025-04-25 | Stop reason: SDUPTHER

## 2025-04-25 RX ORDER — LEVOTHYROXINE SODIUM 100 MCG
TABLET ORAL
Qty: 100 TABLET | Refills: 3 | Status: SHIPPED | OUTPATIENT
Start: 2025-04-25

## 2025-04-25 NOTE — ASSESSMENT & PLAN NOTE
Last u/s in 2013 reportedly showed micronodular appearance.  Neck exam okay today.  Continue with periodic neck exams.

## 2025-04-25 NOTE — PROGRESS NOTES
"     Office Note      Date: 2025  Patient Name: Subha Patino  MRN: [id number]  : 1980    Chief Complaint   Patient presents with    Thyroid Problem       History of Present Illness:   Subha Patino is a 44 y.o. female who presents for Thyroid Problem    She was noted to have goiter on routine exam.  She reports h/o hypothyroidism since around .  She is currently taking Synthroid 100mcg qd but an extra 1/2 tablet once a week.  She notes more fatigue.  She was started on cymbalta and is feeling some better.  She denies any other sxs of hypo- or hyperthyroidism at this time.  She hasn't noted any change in the size of her neck.  She denies any compressive sxs.      Subjective      Review of Systems:   Review of Systems   Constitutional:  Positive for fatigue.   Cardiovascular: Negative.    Gastrointestinal: Negative.    Endocrine: Negative.        The following portions of the patient's history were reviewed and updated as appropriate: allergies, current medications, past family history, past medical history, past social history, past surgical history, and problem list.    Objective     Visit Vitals  /78   Pulse 81   Ht 162.6 cm (64\")   Wt 76.2 kg (168 lb)   SpO2 98%   BMI 28.84 kg/m²       Physical Exam:  Physical Exam  Constitutional:       Appearance: Normal appearance.   Neck:      Thyroid: No thyroid mass, thyromegaly or thyroid tenderness.   Lymphadenopathy:      Cervical: No cervical adenopathy.   Neurological:      Mental Status: She is alert.         Labs:    TSH  No results found for: \"TSHBASE\"     Free T4  Free T4   Date Value Ref Range Status   2024 1.65 0.93 - 1.70 ng/dL Final       T3  No results found for: \"P6IGNOX\"      TPO  No results found for: \"THYROIDAB\"    TG AB  No results found for: \"THGAB\"    TG  No results found for: \"THYROGLB\"    CBC w/DIFF  Lab Results   Component Value Date    WBC 8.60 10/18/2024    RBC 4.35 10/18/2024    HGB 14.5 10/18/2024    HCT 42.2 " 10/18/2024    MCV 97.0 10/18/2024    MCH 33.3 (H) 10/18/2024    MCHC 34.4 10/18/2024    RDW 11.1 (L) 10/18/2024    RDWSD 39.5 10/18/2024    MPV 10.9 10/18/2024     10/18/2024    NEUTRORELPCT 56.1 01/16/2023    LYMPHORELPCT 35.5 01/16/2023    MONORELPCT 5.7 01/16/2023    EOSRELPCT 2.0 01/16/2023    BASORELPCT 0.4 01/16/2023    AUTOIGPER 0.3 01/16/2023    NEUTROABS 3.91 01/16/2023    LYMPHSABS 2.48 01/16/2023    MONOSABS 0.40 01/16/2023    EOSABS 0.14 01/16/2023    BASOSABS 0.03 01/16/2023    AUTOIGNUM 0.02 01/16/2023    NRBC 0.0 01/16/2023           Assessment / Plan      Assessment & Plan:  Diagnoses and all orders for this visit:    1. Acquired hypothyroidism (Primary)  Assessment & Plan:  Continue Synthroid.  Check TSH.  Will send note about results.    Orders:  -     TSH; Future  -     Discontinue: Synthroid 100 MCG tablet; TAKE 1 TABLET BY MOUTH EVERY DAY BUT ADD EXTRA 1/2 TABLET ONCE A WEEK  Dispense: 90 tablet; Refill: 3  -     Synthroid 100 MCG tablet; TAKE 1 TABLET BY MOUTH EVERY DAY BUT ADD EXTRA 1/2 TABLET ONCE A WEEK  Dispense: 100 tablet; Refill: 3  -     TSH    2. Multiple thyroid nodules  Assessment & Plan:  Last u/s in 2013 reportedly showed micronodular appearance.  Neck exam okay today.  Continue with periodic neck exams.        Current Outpatient Medications   Medication Instructions    aspirin 81 mg, Daily    busPIRone (BUSPAR) 15 mg, Oral, 2 Times Daily    DULoxetine (CYMBALTA) 20 mg, Oral, Daily, With food    HYDROcodone-acetaminophen (NORCO)  MG per tablet 1 tablet, Oral, Every 8 Hours PRN    Naloxone HCl (NARCAN) 0.4 mg, Intramuscular, As Needed    nicotine (Nicoderm CQ) 7 MG/24HR patch 1 patch, Transdermal, Every 24 Hours    nitrofurantoin (Macrodantin) 100 MG capsule 1 PO prn intercourse    pantoprazole (PROTONIX) 20 mg, Oral, Daily    propranolol (INDERAL) 10-20 mg, Oral, 2 Times Daily PRN    spironolactone (ALDACTONE) 100 mg, Oral, Daily PRN    Synthroid 100 MCG tablet TAKE 1  TABLET BY MOUTH EVERY DAY BUT ADD EXTRA 1/2 TABLET ONCE A WEEK      Return in about 6 months (around 10/25/2025) for Recheck with TSH.    Electronically signed by: Aaron Mehta MD  04/25/2025

## 2025-04-26 ENCOUNTER — RESULTS FOLLOW-UP (OUTPATIENT)
Dept: ENDOCRINOLOGY | Facility: CLINIC | Age: 45
End: 2025-04-26
Payer: COMMERCIAL

## 2025-05-19 ENCOUNTER — OFFICE VISIT (OUTPATIENT)
Dept: INTERNAL MEDICINE | Facility: CLINIC | Age: 45
End: 2025-05-19
Payer: COMMERCIAL

## 2025-05-19 VITALS
TEMPERATURE: 98 F | BODY MASS INDEX: 28.54 KG/M2 | HEART RATE: 68 BPM | WEIGHT: 167.2 LBS | DIASTOLIC BLOOD PRESSURE: 82 MMHG | SYSTOLIC BLOOD PRESSURE: 122 MMHG | HEIGHT: 64 IN

## 2025-05-19 DIAGNOSIS — F32.A ANXIETY AND DEPRESSION: ICD-10-CM

## 2025-05-19 DIAGNOSIS — F41.1 GENERALIZED ANXIETY DISORDER: ICD-10-CM

## 2025-05-19 DIAGNOSIS — F41.0 PANIC ATTACKS: ICD-10-CM

## 2025-05-19 DIAGNOSIS — M54.40 BILATERAL LOW BACK PAIN WITH SCIATICA, SCIATICA LATERALITY UNSPECIFIED, UNSPECIFIED CHRONICITY: ICD-10-CM

## 2025-05-19 DIAGNOSIS — L70.9 ACNE, UNSPECIFIED ACNE TYPE: ICD-10-CM

## 2025-05-19 DIAGNOSIS — F41.9 ANXIETY AND DEPRESSION: ICD-10-CM

## 2025-05-19 RX ORDER — PROPRANOLOL HYDROCHLORIDE 10 MG/1
10-20 TABLET ORAL 2 TIMES DAILY PRN
Qty: 180 TABLET | Refills: 1 | Status: SHIPPED | OUTPATIENT
Start: 2025-05-19

## 2025-05-19 RX ORDER — DULOXETIN HYDROCHLORIDE 20 MG/1
20 CAPSULE, DELAYED RELEASE ORAL DAILY
Qty: 90 CAPSULE | Refills: 1 | Status: SHIPPED | OUTPATIENT
Start: 2025-05-19

## 2025-05-19 RX ORDER — MULTIVIT WITH MINERALS/LUTEIN
400 TABLET ORAL DAILY
COMMUNITY

## 2025-05-19 RX ORDER — HYDROCODONE BITARTRATE AND ACETAMINOPHEN 10; 325 MG/1; MG/1
1 TABLET ORAL EVERY 8 HOURS PRN
Qty: 90 TABLET | Refills: 0 | Status: SHIPPED | OUTPATIENT
Start: 2025-05-19

## 2025-05-19 RX ORDER — SPIRONOLACTONE 50 MG/1
100 TABLET, FILM COATED ORAL DAILY PRN
Qty: 180 TABLET | Refills: 1 | Status: SHIPPED | OUTPATIENT
Start: 2025-05-19

## 2025-05-19 RX ORDER — VIT C/B6/B5/MAGNESIUM/HERB 173 50-5-6-5MG
CAPSULE ORAL
COMMUNITY

## 2025-05-19 NOTE — PROGRESS NOTES
Back Pain and Anxiety    Subjective   Subha Patino is a 45 y.o. female is here today for follow-up.    Back Pain  Associated symptoms: no chest pain, no fever and no headaches    Anxiety    Symptoms: no chest pain, no confusion, no dizziness, no nausea, no palpitations and no shortness of breath      Taking turmeric , helping her hands.  Gained weight .  Tolerating the cymbalta at 20 mg.        Current Outpatient Medications:     aspirin 81 MG EC tablet, Take 1 tablet by mouth Daily., Disp: , Rfl:     busPIRone (BUSPAR) 15 MG tablet, Take 1 tablet by mouth 2 (Two) Times a Day., Disp: 180 tablet, Rfl: 1    DULoxetine (CYMBALTA) 20 MG capsule, Take 1 capsule by mouth Daily. With food, Disp: 90 capsule, Rfl: 1    HYDROcodone-acetaminophen (NORCO)  MG per tablet, Take 1 tablet by mouth Every 8 (Eight) Hours As Needed for Moderate Pain., Disp: 90 tablet, Rfl: 0    Naloxone HCl (NARCAN) 0.4 MG/ML injection, Inject 1 mL into the appropriate muscle as directed by prescriber As Needed for Opioid Reversal., Disp: 1 mL, Rfl: 0    nicotine (Nicoderm CQ) 7 MG/24HR patch, Place 1 patch on the skin as directed by provider Daily., Disp: 30 each, Rfl: 1    nitrofurantoin (Macrodantin) 100 MG capsule, 1 PO prn intercourse, Disp: 20 capsule, Rfl: 1    pantoprazole (PROTONIX) 20 MG EC tablet, Take 1 tablet by mouth Daily., Disp: 90 tablet, Rfl: 1    propranolol (INDERAL) 10 MG tablet, Take 1-2 tablets by mouth 2 (Two) Times a Day As Needed (palps)., Disp: 180 tablet, Rfl: 1    spironolactone (ALDACTONE) 50 MG tablet, Take 2 tablets by mouth Daily As Needed (acne)., Disp: 180 tablet, Rfl: 1    Synthroid 100 MCG tablet, TAKE 1 TABLET BY MOUTH EVERY DAY BUT ADD EXTRA 1/2 TABLET ONCE A WEEK, Disp: 100 tablet, Rfl: 3    Turmeric 500 MG capsule, Take  by mouth., Disp: , Rfl:     Vitamin D-Vitamin K (VITAMIN D2 + K1 PO), Take  by mouth., Disp: , Rfl:     VITAMIN E 400 UNIT capsule, Take 1 capsule by mouth Daily., Disp: , Rfl:  "      The following portions of the patient's history were reviewed and updated as appropriate: allergies, current medications, past family history, past medical history, past social history, past surgical history and problem list.    Review of Systems   Constitutional: Negative.  Negative for chills and fever.   HENT:  Negative for ear discharge, ear pain, sinus pressure and sore throat.    Respiratory:  Negative for cough, chest tightness and shortness of breath.    Cardiovascular:  Negative for chest pain, palpitations and leg swelling.   Gastrointestinal:  Negative for diarrhea, nausea and vomiting.   Musculoskeletal:  Positive for back pain. Negative for arthralgias and myalgias.   Neurological:  Negative for dizziness, syncope and headaches.   Psychiatric/Behavioral:  Negative for confusion and sleep disturbance.        Objective   /82   Pulse 68   Temp 98 °F (36.7 °C)   Ht 162.6 cm (64\")   Wt 75.8 kg (167 lb 3.2 oz)   BMI 28.70 kg/m²   Physical Exam  Vitals and nursing note reviewed.   Constitutional:       Appearance: She is well-developed.   HENT:      Head: Normocephalic and atraumatic.      Right Ear: External ear normal.      Left Ear: External ear normal.      Mouth/Throat:      Pharynx: No oropharyngeal exudate.   Eyes:      Conjunctiva/sclera: Conjunctivae normal.      Pupils: Pupils are equal, round, and reactive to light.   Neck:      Thyroid: No thyromegaly.   Cardiovascular:      Rate and Rhythm: Normal rate and regular rhythm.      Pulses: Normal pulses.      Heart sounds: Normal heart sounds. No murmur heard.     No friction rub. No gallop.   Pulmonary:      Effort: Pulmonary effort is normal.      Breath sounds: Normal breath sounds.   Abdominal:      General: Bowel sounds are normal. There is no distension.      Palpations: Abdomen is soft.      Tenderness: There is no abdominal tenderness.   Musculoskeletal:      Cervical back: Neck supple.   Skin:     General: Skin is warm and " dry.   Neurological:      Mental Status: She is alert and oriented to person, place, and time.      Cranial Nerves: No cranial nerve deficit.   Psychiatric:         Judgment: Judgment normal.                 Results for orders placed or performed in visit on 04/25/25   TSH    Collection Time: 04/25/25 11:48 AM    Specimen: Arm, Left; Blood   Result Value Ref Range    TSH 0.272 0.270 - 4.200 uIU/mL             Assessment & Plan   Diagnoses and all orders for this visit:    Acne, unspecified acne type  -     spironolactone (ALDACTONE) 50 MG tablet; Take 2 tablets by mouth Daily As Needed (acne).    Generalized anxiety disorder  Comments:  better on cymbalta, continue current dose of 30mg daily.  Orders:  -     propranolol (INDERAL) 10 MG tablet; Take 1-2 tablets by mouth 2 (Two) Times a Day As Needed (palps).    Panic attacks  -     propranolol (INDERAL) 10 MG tablet; Take 1-2 tablets by mouth 2 (Two) Times a Day As Needed (palps).    Bilateral low back pain with sciatica, sciatica laterality unspecified, unspecified chronicity  Comments:  well controlled on current regimen.refilled med.  Orders:  -     HYDROcodone-acetaminophen (NORCO)  MG per tablet; Take 1 tablet by mouth Every 8 (Eight) Hours As Needed for Moderate Pain.    Anxiety and depression  -     DULoxetine (CYMBALTA) 20 MG capsule; Take 1 capsule by mouth Daily. With food    Other orders  -     Turmeric 500 MG capsule; Take  by mouth.  -     VITAMIN E 400 UNIT capsule; Take 1 capsule by mouth Daily.  -     Vitamin D-Vitamin K (VITAMIN D2 + K1 PO); Take  by mouth.    Continue turmeric and vit E    The patient has read and signed the [facility] Controlled Substance Contract.  I will continue to see patient for regular follow up appointments.  They are well controlled on their medication.  MARJORIE has been reviewed by me and is updated every 3 months. The patient is aware of the potential for addiction and dependence.        Return in about 3 months  (around 8/19/2025) for Monday Aug 18th at 1.00 pm.    Electronically signed by:    Catrina Todd MD

## 2025-06-17 DIAGNOSIS — M54.40 BILATERAL LOW BACK PAIN WITH SCIATICA, SCIATICA LATERALITY UNSPECIFIED, UNSPECIFIED CHRONICITY: ICD-10-CM

## 2025-06-17 NOTE — TELEPHONE ENCOUNTER
Last appointment: 5/19/2025  Next appointment: 8/18/2025     UDS: 10/18/2024  CSC:     Last Refill: 5/19/2025 quantity of 90 with 0 refills

## 2025-06-18 RX ORDER — HYDROCODONE BITARTRATE AND ACETAMINOPHEN 10; 325 MG/1; MG/1
1 TABLET ORAL EVERY 8 HOURS PRN
Qty: 90 TABLET | Refills: 0 | Status: SHIPPED | OUTPATIENT
Start: 2025-06-18

## 2025-06-30 ENCOUNTER — TELEPHONE (OUTPATIENT)
Dept: INTERNAL MEDICINE | Facility: CLINIC | Age: 45
End: 2025-06-30
Payer: COMMERCIAL

## 2025-06-30 NOTE — TELEPHONE ENCOUNTER
Patient called into the office wanting to know her blood type. She stated her brother is needing a liver transplant and she would like to be prepared in case she is needed to give blood. I advised to her if she's given blood before she may be able to call the blood bank and see if they can provide that information or the provider will need to order that test for her.     Please advise.     Call back: [phone]

## 2025-06-30 NOTE — TELEPHONE ENCOUNTER
FYBEAU    Spoke to pt states that another relative has the same blood type as her brother and will be donating to him.  Pt will call back if she decides she needs to have lab for blood type.

## 2025-07-23 DIAGNOSIS — M54.40 BILATERAL LOW BACK PAIN WITH SCIATICA, SCIATICA LATERALITY UNSPECIFIED, UNSPECIFIED CHRONICITY: ICD-10-CM

## 2025-07-23 RX ORDER — HYDROCODONE BITARTRATE AND ACETAMINOPHEN 10; 325 MG/1; MG/1
1 TABLET ORAL EVERY 8 HOURS PRN
Qty: 90 TABLET | Refills: 0 | Status: SHIPPED | OUTPATIENT
Start: 2025-07-23

## 2025-07-23 NOTE — TELEPHONE ENCOUNTER
Last appointment: 5/19/2025  Next appointment: 8/18/2025     UDS: 10/18/2024  CSC: 11/20/2023    Last Refill: 6/18/2025 quantity of 90 with 0 refills

## 2025-08-11 DIAGNOSIS — F41.1 GENERALIZED ANXIETY DISORDER: ICD-10-CM

## 2025-08-11 DIAGNOSIS — F41.0 PANIC ATTACKS: ICD-10-CM

## 2025-08-11 RX ORDER — PROPRANOLOL HYDROCHLORIDE 10 MG/1
10-20 TABLET ORAL 2 TIMES DAILY PRN
Qty: 360 TABLET | OUTPATIENT
Start: 2025-08-11

## 2025-08-18 ENCOUNTER — OFFICE VISIT (OUTPATIENT)
Dept: INTERNAL MEDICINE | Facility: CLINIC | Age: 45
End: 2025-08-18
Payer: COMMERCIAL

## 2025-08-18 VITALS
TEMPERATURE: 97.8 F | BODY MASS INDEX: 27.21 KG/M2 | SYSTOLIC BLOOD PRESSURE: 126 MMHG | HEIGHT: 64 IN | HEART RATE: 66 BPM | OXYGEN SATURATION: 98 % | WEIGHT: 159.4 LBS | DIASTOLIC BLOOD PRESSURE: 74 MMHG

## 2025-08-18 DIAGNOSIS — M54.40 BILATERAL LOW BACK PAIN WITH SCIATICA, SCIATICA LATERALITY UNSPECIFIED, UNSPECIFIED CHRONICITY: ICD-10-CM

## 2025-08-18 DIAGNOSIS — Z72.0 TOBACCO USE: Primary | ICD-10-CM

## 2025-08-18 DIAGNOSIS — N39.0 RECURRENT UTI: ICD-10-CM

## 2025-08-18 PROCEDURE — 99214 OFFICE O/P EST MOD 30 MIN: CPT | Performed by: INTERNAL MEDICINE

## 2025-08-18 RX ORDER — MULTIPLE VITAMINS W/ MINERALS TAB 9MG-400MCG
1 TAB ORAL DAILY
COMMUNITY

## 2025-08-18 RX ORDER — NICOTINE 21 MG/24HR
1 PATCH, TRANSDERMAL 24 HOURS TRANSDERMAL EVERY 24 HOURS
Qty: 30 EACH | Refills: 2 | Status: SHIPPED | OUTPATIENT
Start: 2025-08-18

## 2025-08-18 RX ORDER — NITROFURANTOIN MACROCRYSTALS 100 MG/1
CAPSULE ORAL
Qty: 20 CAPSULE | Refills: 1 | Status: SHIPPED | OUTPATIENT
Start: 2025-08-18

## 2025-08-18 RX ORDER — HYDROCODONE BITARTRATE AND ACETAMINOPHEN 10; 325 MG/1; MG/1
1 TABLET ORAL EVERY 8 HOURS PRN
Qty: 90 TABLET | Refills: 0 | Status: SHIPPED | OUTPATIENT
Start: 2025-08-18